# Patient Record
Sex: FEMALE | Race: WHITE | Employment: OTHER | URBAN - METROPOLITAN AREA
[De-identification: names, ages, dates, MRNs, and addresses within clinical notes are randomized per-mention and may not be internally consistent; named-entity substitution may affect disease eponyms.]

---

## 2017-01-26 RX ORDER — ACETAMINOPHEN 325 MG/1
975 TABLET ORAL ONCE
Status: DISCONTINUED | OUTPATIENT
Start: 2017-01-31 | End: 2017-02-01

## 2017-01-26 RX ORDER — DIPHENHYDRAMINE HYDROCHLORIDE 50 MG/ML
25 INJECTION, SOLUTION INTRAMUSCULAR; INTRAVENOUS ONCE
Status: DISCONTINUED | OUTPATIENT
Start: 2017-01-31 | End: 2017-02-01

## 2017-01-31 ENCOUNTER — HOSPITAL ENCOUNTER (OUTPATIENT)
Dept: INFUSION THERAPY | Age: 72
Discharge: HOME | End: 2017-01-31

## 2017-03-28 ENCOUNTER — APPOINTMENT (OUTPATIENT)
Dept: INFUSION THERAPY | Age: 72
End: 2017-03-28

## 2017-06-27 ENCOUNTER — HOSPITAL ENCOUNTER (OUTPATIENT)
Dept: MAMMOGRAPHY | Age: 72
Discharge: HOME | End: 2017-06-27
Attending: INTERNAL MEDICINE
Payer: MEDICARE

## 2017-06-27 DIAGNOSIS — Z12.31 VISIT FOR SCREENING MAMMOGRAM: ICD-10-CM

## 2017-06-27 PROCEDURE — 77067 SCR MAMMO BI INCL CAD: CPT

## 2017-07-26 PROBLEM — E03.9 ACQUIRED HYPOTHYROIDISM: Status: ACTIVE | Noted: 2017-07-26

## 2017-07-26 PROBLEM — Z79.899 ON STATIN THERAPY: Status: ACTIVE | Noted: 2017-07-26

## 2017-07-26 PROBLEM — Z78.0 POST-MENOPAUSAL: Status: ACTIVE | Noted: 2017-07-26

## 2017-07-26 PROBLEM — N18.9 CKD (CHRONIC KIDNEY DISEASE): Status: ACTIVE | Noted: 2017-07-26

## 2017-08-28 ENCOUNTER — OFFICE VISIT (OUTPATIENT)
Dept: INTERNAL MEDICINE CLINIC | Age: 72
End: 2017-08-28

## 2017-08-28 VITALS
RESPIRATION RATE: 18 BRPM | WEIGHT: 180.2 LBS | BODY MASS INDEX: 28.28 KG/M2 | HEIGHT: 67 IN | DIASTOLIC BLOOD PRESSURE: 86 MMHG | TEMPERATURE: 98 F | HEART RATE: 92 BPM | SYSTOLIC BLOOD PRESSURE: 132 MMHG | OXYGEN SATURATION: 96 %

## 2017-08-28 DIAGNOSIS — I48.0 PAF (PAROXYSMAL ATRIAL FIBRILLATION) (HCC): ICD-10-CM

## 2017-08-28 DIAGNOSIS — I10 ESSENTIAL HYPERTENSION: Primary | ICD-10-CM

## 2017-08-28 DIAGNOSIS — E03.9 ACQUIRED HYPOTHYROIDISM: ICD-10-CM

## 2017-08-28 DIAGNOSIS — M15.9 PRIMARY OSTEOARTHRITIS INVOLVING MULTIPLE JOINTS: ICD-10-CM

## 2017-08-28 DIAGNOSIS — Z12.11 COLON CANCER SCREENING: ICD-10-CM

## 2017-08-28 DIAGNOSIS — N18.2 CKD (CHRONIC KIDNEY DISEASE), STAGE 2 (MILD): ICD-10-CM

## 2017-08-28 DIAGNOSIS — E11.9 CONTROLLED TYPE 2 DIABETES MELLITUS WITHOUT COMPLICATION, WITHOUT LONG-TERM CURRENT USE OF INSULIN (HCC): ICD-10-CM

## 2017-08-28 DIAGNOSIS — E78.2 MIXED HYPERLIPIDEMIA: ICD-10-CM

## 2017-08-28 DIAGNOSIS — Z00.00 MEDICARE ANNUAL WELLNESS VISIT, INITIAL: ICD-10-CM

## 2017-08-28 LAB
ALBUMIN SERPL-MCNC: 4.1 G/DL (ref 3.9–5.4)
ALKALINE PHOS POC: 98 U/L (ref 38–126)
ALT SERPL-CCNC: 32 U/L (ref 9–52)
AST SERPL-CCNC: 28 U/L (ref 14–36)
BUN BLD-MCNC: 15 MG/DL (ref 7–17)
CALCIUM BLD-MCNC: 9.5 MG/DL (ref 8.4–10.2)
CHLORIDE BLD-SCNC: 102 MMOL/L (ref 98–107)
CHOLEST SERPL-MCNC: 179 MG/DL (ref 0–200)
CK (CPK) POC: 137 U/L (ref 30–135)
CO2 POC: 29 MMOL/L (ref 22–32)
CREAT BLD-MCNC: 0.6 MG/DL (ref 0.7–1.2)
EGFR (POC): 91.7
GLUCOSE POC: 108 MG/DL (ref 65–105)
HBA1C MFR BLD HPLC: 6.1 % (ref 4.5–5.7)
HDLC SERPL-MCNC: 58 MG/DL (ref 35–130)
LDL CHOLESTEROL POC: 84.2 MG/DL (ref 0–130)
MICROALBUMIN UR TEST STR-MCNC: NEGATIVE MG/L (ref 0–20)
POTASSIUM SERPL-SCNC: 4.1 MMOL/L (ref 3.6–5)
PROT SERPL-MCNC: 6.9 G/DL (ref 6.3–8.2)
SODIUM SERPL-SCNC: 144 MMOL/L (ref 137–145)
TCHOL/HDL RATIO (POC): 3.1 (ref 0–4)
TOTAL BILIRUBIN POC: 0.7 MG/DL (ref 0.2–1.3)
TRIGL SERPL-MCNC: 184 MG/DL (ref 0–200)
VLDLC SERPL CALC-MCNC: 36.8 MG/DL

## 2017-08-28 NOTE — PROGRESS NOTES
3 month follow up    Having left hip pain. Has Advanced Medical Directive. 1. Have you been to the ER, urgent care clinic since your last visit? Hospitalized since your last visit? No    2. Have you seen or consulted any other health care providers outside of the [address] [place] [address] since your last visit? Include any pap smears or colon screening.   No

## 2017-08-28 NOTE — MR AVS SNAPSHOT
Visit Information Date & Time Provider Department Dept. Phone Encounter #  
 8/28/2017 10:30 AM Constanza Bellamy MD [address] [phone] [id number] Follow-up Instructions Return in about 3 months (around 11/28/2017). Follow-up and Disposition History Your Appointments 12/6/2017  8:20 AM  
FOLLOW UP 10 with MD JOSE Yung [facility] ([facility]) Appt Note: [address] [id number] [address] [address] Upcoming Health Maintenance Date Due  
 EYE EXAM RETINAL OR DILATED Q1 3/7/1956 DTaP/Tdap/Td series (1 - Tdap) 3/7/1967 FOBT Q 1 YEAR AGE 50-75 3/7/1996 ZOSTER VACCINE AGE 60> 1/7/2006 GLAUCOMA SCREENING Q2Y 3/7/2011 OSTEOPOROSIS SCREENING (DEXA) 3/7/2011 Pneumococcal 65+ Low/Medium Risk (2 of 2 - PCV13) 10/1/2016 INFLUENZA AGE 9 TO ADULT 8/1/2017 HEMOGLOBIN A1C Q6M 2/28/2018 FOOT EXAM Q1 8/28/2018 MICROALBUMIN Q1 8/28/2018 LIPID PANEL Q1 8/28/2018 MEDICARE YEARLY EXAM 8/29/2018 BREAST CANCER SCRN MAMMOGRAM 6/27/2019 Allergies as of 8/28/2017  Review Complete On: 8/28/2017 By: Constanza Bellamy MD  
  
 Severity Noted Reaction Type Reactions Lisinopril  07/26/2017    Cough Current Immunizations  Reviewed on 8/28/2017 Name Date Influenza Vaccine 10/1/2016, 10/6/2015 Pneumococcal Conjugate (PCV-13) 10/1/2015 Pneumococcal Polysaccharide (PPSV-23) 6/1/2011, 6/1/2011 Reviewed by Bill Anton RN on 8/28/2017 at 11:06 AM  
You Were Diagnosed With   
  
 Codes Comments Essential hypertension    -  Primary ICD-10-CM: I10 
ICD-9-CM: 401.9 Mixed hyperlipidemia     ICD-10-CM: E78.2 ICD-9-CM: 272.2 Controlled type 2 diabetes mellitus without complication, without long-term current use of insulin ([facility]     ICD-10-CM: E11.9 ICD-9-CM: 250.00 CKD (chronic kidney disease), stage 2 (mild)     ICD-10-CM: N18.2 ICD-9-CM: 585.2 Primary osteoarthritis involving multiple joints     ICD-10-CM: M15.0 ICD-9-CM: 715.09   
 PAF (paroxysmal atrial fibrillation) (HCC)     ICD-10-CM: I48.0 ICD-9-CM: 427.31 Acquired hypothyroidism     ICD-10-CM: E03.9 ICD-9-CM: 244.9 Medicare annual wellness visit, initial     ICD-10-CM: Z00.00 ICD-9-CM: V70.0 Colon cancer screening     ICD-10-CM: Z12.11 ICD-9-CM: V76.51 Vitals BP Pulse Temp Resp Height(growth percentile) Weight(growth percentile) 132/86 (BP 1 Location: Right arm, BP Patient Position: Sitting) 92 98 °F (36.7 °C) (Oral) 18 5' 7\" (1.702 m) 180 lb 3.2 oz (81.7 kg) SpO2 BMI OB Status Smoking Status 96% 28.22 kg/m2 Postmenopausal Former Smoker BMI and BSA Data Body Mass Index Body Surface Area  
 28.22 kg/m 2 1.97 m 2 Preferred Pharmacy Pharmacy Name Phone [facility] [phone] Your Updated Medication List  
  
   
This list is accurate as of: 8/28/17 12:00 PM.  Always use your most recent med list. amLODIPine 2.5 mg tablet Commonly known as:  Elisa Jose Take 2.5 mg by mouth daily. calcium-cholecalciferol (D3) tablet Commonly known as:  CALTRATE 600+D Take 1 Tab by mouth daily. COMPLETE MULTIVITAMIN PO Take 1 Tab by mouth daily. Indications: Complete multivitamin Women's 50+ DICLOFENAC SODIUM OP Take 1 Tab by mouth two (2) times daily as needed. Indications: 75 MG  
  
 hydroCHLOROthiazide 25 mg tablet Commonly known as:  HYDRODIURIL Take 12.5 mg by mouth daily. levothyroxine 50 mcg tablet Commonly known as:  SYNTHROID Take 50 mcg by mouth Daily (before breakfast). nateglinide 120 mg tablet Commonly known as:  Meghna Anderson Take 120 mg by mouth Before breakfast, lunch, and dinner. We Performed the Following AMB POC CK (CPK) [88604 CPT(R)] AMB POC COMPREHENSIVE METABOLIC PANEL [75997 CPT(R)] AMB POC FECAL BLOOD, OCCULT, QL 3 CARDS [39612 CPT(R)] AMB POC HEMOGLOBIN A1C [19729 CPT(R)] AMB POC LIPID PROFILE [73091 CPT(R)] AMB POC URINE, MICROALBUMIN, SEMIQUANT (1 RESULT) [80351 CPT(R)] Follow-up Instructions Return in about 3 months (around 11/28/2017). Introducing [facility]! Leland Tamayo introduces [employer] patient portal. Now you can access parts of your medical record, email your doctor's office, and request medication refills online. 1. In your internet browser, go to [email or web address] [email or web address] 2. Click on the First Time User? Click Here link in the Sign In box. You will see the New Member Sign Up page. 3. Enter your [employer] Access Code exactly as it appears below. You will not need to use this code after youve completed the sign-up process. If you do not sign up before the expiration date, you must request a new code. · [employer] Access Code: [email or web address] Expires: 9/25/2017 10:06 AM 
 
4. Enter the last four digits of your Social Security Number (xxxx) and Date of Birth (mm/dd/yyyy) as indicated and click Submit. You will be taken to the next sign-up page. 5. Create a [employer] ID. This will be your [employer] login ID and cannot be changed, so think of one that is secure and easy to remember. 6. Create a [employer] password. You can change your password at any time. 7. Enter your Password Reset Question and Answer. This can be used at a later time if you forget your password. 8. Enter your e-mail address. You will receive e-mail notification when new information is available in [address] 9. Click Sign Up. You can now view and download portions of your medical record. 10. Click the Download Summary menu link to download a portable copy of your medical information. If you have questions, please visit the Frequently Asked Questions section of the [employer] website. Remember, [employer] is NOT to be used for urgent needs. For medical emergencies, dial 911. Now available from your iPhone and Android! Please provide this summary of care documentation to your next provider. Your primary care clinician is listed as Jane. If you have any questions after today's visit, please call [phone].

## 2017-08-28 NOTE — PROGRESS NOTES
This is an Initial Medicare Annual Wellness Exam (AWV) (Performed 12 months after IPPE or effective date of Medicare Part B enrollment, Once in a lifetime)    I have reviewed the patient's medical history in detail and updated the computerized patient record. She presents today for initial annual Medicare wellness examination. She is also here for follow-up of her medical problems include hypertension, diabetes, hyperlipidemia, chronic kidney disease, DJD, and history of Crohn's disease. She is noting a little discomfort with some back pain now. She is only been taking the diclofenac on a as needed basis when she develops pleurisy and thus has not taken for a back. There are no other arthritic complaints. She denies any cardiorespiratory complaints. There are no GI/ complaints. No other complaints on complete review of systems. She is taking medications and trying to follow her diet on a regular basis. She is trying to get some exercise but no she is not getting enough. History     Past Medical History:   Diagnosis Date    Acquired hypothyroidism 7/26/2017    CKD (chronic kidney disease) 7/26/2017    [facility]     Diabetes ([facility]     Hypertension     On statin therapy 7/26/2017    Post-menopausal 7/26/2017      Past Surgical History:   Procedure Laterality Date    BREAST SURGERY PROCEDURE UNLISTED      cyst removed right    HX GI      tumor from colon removed    HX ORTHOPAEDIC      right wrist    MA COLONOSCOPY W/BIOPSY SINGLE/MULTIPLE  4/9/2014          Current Outpatient Prescriptions   Medication Sig Dispense Refill    DICLOFENAC SODIUM OP Take 1 Tab by mouth two (2) times daily as needed. Indications: 75 MG      MULTIVIT,TH IRON,OTHER MIN (COMPLETE MULTIVITAMIN PO) Take 1 Tab by mouth daily. Indications: Complete multivitamin Women's 50+      calcium-cholecalciferol, D3, (CALTRATE 600+D) tablet Take 1 Tab by mouth daily.       hydrochlorothiazide (HYDRODIURIL) 25 mg tablet Take 12.5 mg by mouth daily.  amLODIPine (NORVASC) 2.5 mg tablet Take 2.5 mg by mouth daily.  levothyroxine (SYNTHROID) 50 mcg tablet Take 50 mcg by mouth Daily (before breakfast).  nateglinide (STARLIX) 120 mg tablet Take 120 mg by mouth Before breakfast, lunch, and dinner. Allergies   Allergen Reactions    Lisinopril Cough     Family History   Problem Relation Age of Onset    Diabetes Mother     Heart Failure Mother     Cancer Father      stomach     Social History   Substance Use Topics    Smoking status: Former Smoker     Quit date: 1979    Smokeless tobacco: Never Used    Alcohol use Yes      Comment: occasionally     Patient Active Problem List   Diagnosis Code    SBO (small bowel obstruction) ([facility] K56.69    Crohn disease ([facility] K50.90    Hypertension I10    Diabetes mellitus type 2, controlled ([facility] E11.9    Hyperlipidemia E78.5    DJD (degenerative joint disease) M19.90    PAF (paroxysmal atrial fibrillation) (HCC) I48.0    Post-menopausal Z78.0    On statin therapy Z79.899    Acquired hypothyroidism E03.9    CKD (chronic kidney disease) N18.9    Medicare annual wellness visit, initial Z00.00    Colon cancer screening Z12.11       Depression Risk Factor Screening:     PHQ over the last two weeks 8/28/2017   Little interest or pleasure in doing things Not at all   Feeling down, depressed or hopeless Not at all   Total Score PHQ 2 0     Alcohol Risk Factor Screening: You do not drink alcohol or very rarely. Functional Ability and Level of Safety:     Hearing Loss  Hearing is good. Activities of Daily Living  The home contains: no safety equipment  Patient does total self care    Fall RiskFall Risk Assessment, last 12 mths 8/28/2017   Able to walk? Yes   Fall in past 12 months?  No       Abuse Screen  Patient is not abused    Cognitive Screening   Evaluation of Cognitive Function:  Has your family/caregiver stated any concerns about your memory: no  Normal ROS:    Constitutional: She denies fevers, weight loss, sweats. Eyes: No blurred or double vision. ENT: No difficulty with swallowing, taste, speech or smell. NECK: no stiffness swelling or lymph node enlargement  Respiratory: No cough wheezing or shortness of breath. Cardiovascular: Denies chest pain, palpitations, unexplained indigestion or syncope. Breast: She has noted no masses or lumps and no discharge or axillary swelling  Gastrointestinal:  No changes in bowel movements, no abdominal pain, no bloating. Genitourinary: No discharge or abnormal bleeding or pain  Extremities: No joint pain, stiffness or swelling. Some back pain that goes down the left leg. Neurological:  No numbness, tingling, burring paresthesias or loss of motor strength. No syncope, dizziness or frequent headache  Skin:  No recent rashes or mole changes. Psychiatric/Behavioral:  Negative for depression. The patient is not nervous/anxious. HEMATOLOGIC: no easy bruising or bleeding gums  Endocrine: no sweats of urinary frequency or excessive thirst    Vitals:    08/28/17 1111   BP: 132/86   Pulse: 92   Resp: 18   Temp: 98 °F (36.7 °C)   TempSrc: Oral   SpO2: 96%   Weight: 180 lb 3.2 oz (81.7 kg)   Height: 5' 7\" (1.702 m)   PainSc:   8   PainLoc: Hip        PHYSICAL EXAM:    General appearance - alert, well appearing, and in no distress  Mental status - alert, oriented to person, place, and time  HEENT:  Ears - bilateral TM's and external ear canals clear  Eyes - pupillary responses were normal.  Extraocular muscle function intact. Lids and conjunctiva not injected. Fundoscopic exam revealed sharp disc margins. eye movements intact  Pharynx- clear with teeth in good repair. No masses were noted  Neck - supple without thyromegaly or burit. No JVD noted  Lungs - clear to auscultation and percussion  Cardiac- normal rate, regular rhythm without murmurs. PMI not displaced.   No gallop, rub or click  Breast: deferred to GYN  Abdomen - flat, soft, non-tender without palpable organomegaly or mass. No pulsatile mass was felt, and not bruit was heard. Bowel sounds were active   Female - deferred to GYN  Rectal - deferred to GYN  Extremities -  no clubbing cyanosis or edema, no diabetic foot ulcer changes noted  Lymphatics - no palpable lymphadenopathy, no hepatosplenomegaly  Peripheral vascular - Dorsalis pedis and posterior tibial pulses felt without difficulty  Skin - no rash or unusual mole change noted, no diabetic foot changes noted no ulcerations  Neurological - Cranial nerves II-XII grossly intact. Motor strength 5/5. DTR's 2+ and symmetric. Station and gait normal.  Normal distal sensation and proprioception in all toes of both feet  Back exam - full range of motion, no tenderness, palpable spasm or pain on motion  Musculoskeletal - no joint tenderness, deformity or swelling. Back without point CVA tenderness  Hematologic: no purpura, petechiae or bruising    Patient Care Team   Patient Care Team:  Bradford Franklin MD as PCP - General (Internal Medicine)    Advice/Referrals/Counseling   Education and counseling provided:  Are appropriate based on today's review and evaluation  Colorectal cancer screening tests    Assessment/Plan     ASSESSMENT:   1. Essential hypertension    2. Mixed hyperlipidemia    3. Controlled type 2 diabetes mellitus without complication, without long-term current use of insulin ([address]    4. CKD (chronic kidney disease), stage 2 (mild)    5. Primary osteoarthritis involving multiple joints    6. PAF (paroxysmal atrial fibrillation) ([address]    7. Acquired hypothyroidism    8. Medicare annual wellness visit, initial    9. Colon cancer screening      Impression  1. Hypertension that is controlled  2. Hyperlipidemia we will see what that status is make further recommendations adjustments as necessary  3.   Diabetes I reviewed her last numbers with her and will see what that status is today make adjustments as necessary  4. CKD that status is pending last numbers reviewed with her  5. DJD seems to be stable except for the back pain as noted I told her to take the diclofenac twice a day for couple weeks and see if it helps. 6.  PAF seems to be in sinus rhythm today  7. Hypothyroid stable last check  Medicare annual wellness questionnaire and examination performed on patient today. The results were discussed with her and her questions were answered. Lifestyle modifications and recommendations discussed and made. Laboratory studies are pending as noted we will make further recommendations based upon those. Follow stable continue same and recheck her in 3 months or sooner should they be a problem she will notify me if the back does not improve with the above treatment. PLAN:  .  Orders Placed This Encounter    AMB POC FECAL OCCULT BLOOD QL-3 CARDS    AMB POC LIPID PROFILE    AMB POC HEMOGLOBIN A1C    AMB POC COMPREHENSIVE METABOLIC PANEL    AMB POC CK (CPK)    AMB POC URINE, MICROALBUMIN, SEMIQUANT (1 RESULT)         ATTENTION:   This medical record was transcribed using an electronic medical records system. Although proofread, it may and can contain electronic and spelling errors. Other human spelling and other errors may be present. Corrections may be executed at a later time. Please feel free to contact us for any clarifications as needed. Follow-up Disposition:  Return in about 3 months (around 11/28/2017).       Lino Yee MD  Health Maintenance Due   Topic Date Due    EYE EXAM RETINAL OR DILATED Q1  03[birth year]07/1956    DTaP/Tdap/Td series (1 - Tdap) 03/07/1967    FOBT Q 1 YEAR AGE 50-75  03/07/1996    ZOSTER VACCINE AGE 60>  01/07/2006    GLAUCOMA SCREENING Q2Y  03/07/2011    OSTEOPOROSIS SCREENING (DEXA)  03/07/2011    Pneumococcal 65+ Low/Medium Risk (2 of 2 - PCV13) 10/01/2016    INFLUENZA AGE 9 TO ADULT  08/01/2017

## 2017-09-01 LAB
HEMOCCULT STL QL: NEGATIVE
VALID INTERNAL CONTROL?: YES

## 2017-10-04 DIAGNOSIS — I10 ESSENTIAL HYPERTENSION: Primary | ICD-10-CM

## 2017-10-05 RX ORDER — AMLODIPINE BESYLATE 2.5 MG/1
TABLET ORAL
Qty: 30 TAB | Refills: 11 | Status: SHIPPED | OUTPATIENT
Start: 2017-10-05 | End: 2018-09-25 | Stop reason: SDUPTHER

## 2017-10-05 NOTE — TELEPHONE ENCOUNTER
Requested Prescriptions     Pending Prescriptions Disp Refills    amLODIPine (NORVASC) 2.5 mg tablet [Pharmacy Med Name: AMLODIPINE 2.5MG] 30 Tab 11     Sig: TAKE 1 TABLET EVERY DAY

## 2017-10-23 ENCOUNTER — OFFICE VISIT (OUTPATIENT)
Dept: INTERNAL MEDICINE CLINIC | Age: 72
End: 2017-10-23

## 2017-10-23 VITALS
HEART RATE: 79 BPM | DIASTOLIC BLOOD PRESSURE: 73 MMHG | TEMPERATURE: 98.4 F | RESPIRATION RATE: 16 BRPM | BODY MASS INDEX: 28.88 KG/M2 | HEIGHT: 67 IN | WEIGHT: 184 LBS | OXYGEN SATURATION: 96 % | SYSTOLIC BLOOD PRESSURE: 112 MMHG

## 2017-10-23 DIAGNOSIS — J20.9 ACUTE BRONCHITIS, UNSPECIFIED ORGANISM: Primary | ICD-10-CM

## 2017-10-23 RX ORDER — LEVOFLOXACIN 500 MG/1
500 TABLET, FILM COATED ORAL DAILY
Qty: 10 TAB | Refills: 0 | Status: SHIPPED | OUTPATIENT
Start: 2017-10-23 | End: 2017-12-06 | Stop reason: ALTCHOICE

## 2017-10-23 NOTE — PROGRESS NOTES
Chief Complaint   Patient presents with    Cold Symptoms     c/o cough and sore throat for past 24 hrs. Cough is mildly productive with clear sputum. 1. Have you been to the ER, urgent care clinic since your last visit? Hospitalized since your last visit? No    2. Have you seen or consulted any other health care providers outside of the [address] [place] [address] since your last visit? Include any pap smears or colon screening.  No

## 2017-10-23 NOTE — PROGRESS NOTES
Subjective:   Jeff Jacob is a 71 y.o. female      Chief Complaint   Patient presents with    Cold Symptoms     c/o cough and sore throat for past 24 hrs. Cough is mildly productive with clear sputum. History of present illness: She presents, a lot of sinus and nasal congestion as well as a lot of chest congestion cough present over the past 2-3 days. She is noted no fevers or chills with this although she had a little fever this morning she woke up. She has not noted any color of the sputum. There are no other complaints particular she denies any shortness of breath. Patient Active Problem List   Diagnosis Code    SBO (small bowel obstruction) K56.609    Crohn disease ([facility] K50.90    Hypertension I10    Diabetes mellitus type 2, controlled ([facility] E11.9    Hyperlipidemia E78.5    DJD (degenerative joint disease) M19.90    PAF (paroxysmal atrial fibrillation) ([facility]) I48.0    Post-menopausal Z78.0    On statin therapy Z79.899    Acquired hypothyroidism E03.9    CKD (chronic kidney disease) N18.9    Medicare annual wellness visit, initial Z00.00    Colon cancer screening Z12.11    Acute bronchitis J20.9      Past Medical History:   Diagnosis Date    Acquired hypothyroidism 7/26/2017    CKD (chronic kidney disease) 7/26/2017    [facility]     Diabetes ([facility]     Hypertension     On statin therapy 7/26/2017    Post-menopausal 7/26/2017      Allergies   Allergen Reactions    Lisinopril Cough      Family History   Problem Relation Age of Onset    Diabetes Mother     Heart Failure Mother     Cancer Father      stomach      Social History     Social History    Marital status:      Spouse name: N/A    Number of children: N/A    Years of education: N/A     Occupational History    Not on file.      Social History Main Topics    Smoking status: Former Smoker     Quit date: 1979    Smokeless tobacco: Never Used    Alcohol use Yes      Comment: occasionally    Drug use: No    Sexual activity: No     Other Topics Concern    Not on file     Social History Narrative     Prior to Admission medications    Medication Sig Start Date End Date Taking? Authorizing Provider   levoFLOXacin (LEVAQUIN) 500 mg tablet Take 1 Tab by mouth daily. 10/23/17  Yes Carolyne Gomez MD   amLODIPine (NORVASC) 2.5 mg tablet TAKE 1 TABLET EVERY DAY 10/5/17  Yes Carolyne Gomez MD   DICLOFENAC SODIUM OP Take 1 Tab by mouth two (2) times daily as needed. Indications: 75 MG   Yes Historical Provider   MULTIVIT,TH IRON,OTHER MIN (COMPLETE MULTIVITAMIN PO) Take 1 Tab by mouth daily. Indications: Complete multivitamin Women's 50+   Yes Cordelia Queen MD   calcium-cholecalciferol, D3, (CALTRATE 600+D) tablet Take 1 Tab by mouth daily. Yes Cordelia Queen MD   hydrochlorothiazide (HYDRODIURIL) 25 mg tablet Take 12.5 mg by mouth daily. Yes Historical Provider   levothyroxine (SYNTHROID) 50 mcg tablet Take 50 mcg by mouth Daily (before breakfast). Yes Historical Provider   nateglinide (STARLIX) 120 mg tablet Take 120 mg by mouth Before breakfast, lunch, and dinner. Yes Historical Provider        Review of Systems              Constitutional:  She denies fever, weight loss, sweats or fatigue. EYES: No blurred or double vision,               ENT: Positive for nasal congestion, no headache or dizziness. No difficulty with               swallowing, taste, speech or smell. Respiratory: Some cough without wheezing or shortness of breath. No sputum production. Cardiac:  Denies chest pain, palpitations, unexplained indigestion, syncope, edema, PND or orthopnea. GI:  No changes in bowel movements, no abdominal pain, no bloating, anorexia, nausea, vomiting or heartburn. :  No frequency or dysuria. Denies incontinence or sexual dysfunction. Extremities:  No joint pain, stiffness or swelling  Back:.no pain or soreness  Skin:  No recent rashes or mole changes.   Neurological:  No numbness, tingling, burning paresthesias or loss of motor strength. No syncope, dizziness, frequent headaches or memory loss. Hematologic:  No easy bruising  Lymphatic: No lymph node enlargement    Objective:     Vitals:    10/23/17 1658   BP: 112/73   Pulse: 79   Resp: 16   Temp: 98.4 °F (36.9 °C)   TempSrc: Oral   SpO2: 96%   Weight: 184 lb (83.5 kg)   Height: 5' 7\" (1.702 m)   PainSc:   0 - No pain       Body mass index is 28.82 kg/(m^2). Physical Examination:              General Appearance:  Well-developed, well-nourished, no acute distress. HEENT:      Ears:  The TMs and ear canals were clear. Eyes:  The pupillary responses were normal.  Extraocular muscle function intact. Lids and conjunctiva not injected. Funduscopic exam revealed sharp disc margins. Nares: Clear w/o edema or erythema  Pharynx:  Clear with teeth in good repair. No masses were noted. Neck:  Supple without thyromegaly or adenopathy. No JVD noted. No carotid                bruits. Lungs:  Clear to auscultation except some scattered rhonchi without wheezes or rales and normal percussion. Cardiac:  Regular rate and rhythm without murmur. PMI not displaced. No gallop, rub or click. Abdominal: Soft, non-tender, no hepata-spleenomegally or masses  Extremities:  No clubbing, cyanosis or edema. Skin:  No rash or unusual mole changes noted. Lymph Nodes:  None felt in the cervical, supraclavicular, axillary or inguinal region. Neurological: . DTRs 2+ and symmetric. Station and gait normal.   Hematologic:   No purpura or petechiae        Assessment/Plan:         1. Acute bronchitis, unspecified organism        Impressions/Plan: We will treat her acute bronchitis with Levaquin 500 daily for 10 days recheck if not resolved    Orders Placed This Encounter    levoFLOXacin (LEVAQUIN) 500 mg tablet       Follow-up Disposition: Not on File    No results found for any visits on 10/23/17.       Kendra Grove MD    The patient was given after the visit summary the patient verbalized an understanding of the plans and problems as explained.

## 2017-10-23 NOTE — MR AVS SNAPSHOT
Visit Information Date & Time Provider Department Dept. Phone Encounter #  
 10/23/2017  4:00 PM Alexsandra Zimmer, [address] [facility] [phone] [id number] Your Appointments 12/6/2017  8:20 AM  
FOLLOW UP 10 with [facility] ([facility]) Appt Note: [address] [facility] [address] [phone] [address] [address] Upcoming Health Maintenance Date Due  
 EYE EXAM RETINAL OR DILATED Q1 3/7/1956 DTaP/Tdap/Td series (1 - Tdap) 3/7/1967 ZOSTER VACCINE AGE 60> 1/7/2006 GLAUCOMA SCREENING Q2Y 3/7/2011 OSTEOPOROSIS SCREENING (DEXA) 3/7/2011 INFLUENZA AGE 9 TO ADULT 8/1/2017 HEMOGLOBIN A1C Q6M 2/28/2018 FOOT EXAM Q1 8/28/2018 MICROALBUMIN Q1 8/28/2018 LIPID PANEL Q1 8/28/2018 MEDICARE YEARLY EXAM 8/29/2018 FOBT Q 1 YEAR AGE 50-75 9/1/2018 BREAST CANCER SCRN MAMMOGRAM 6/27/2019 Allergies as of 10/23/2017  Review Complete On: 10/23/2017 By: Sarah Sen LPN Severity Noted Reaction Type Reactions Lisinopril  07/26/2017    Cough Current Immunizations  Reviewed on 8/28/2017 Name Date Influenza Vaccine 10/1/2016, 10/6/2015 Pneumococcal Conjugate (PCV-13) 10/1/2015 Pneumococcal Polysaccharide (PPSV-23) 6/1/2011, 6/1/2011 Not reviewed this visit Vitals BP Pulse Temp Resp Height(growth percentile) Weight(growth percentile) 112/73 (BP 1 Location: Right arm, BP Patient Position: Sitting) 79 98.4 °F (36.9 °C) (Oral) 16 5' 7\" (1.702 m) 184 lb (83.5 kg) SpO2 BMI OB Status Smoking Status 96% 28.82 kg/m2 Postmenopausal Former Smoker BMI and BSA Data Body Mass Index Body Surface Area  
 28.82 kg/m 2 1.99 m 2 Preferred Pharmacy Pharmacy Name Phone  [facility] [address] [phone] Your Updated Medication List  
  
   
This list is accurate as of: 10/23/17  5:08 PM.  Always use your most recent med list. amLODIPine 2.5 mg tablet Commonly known as:  Demetrius Alstrom TAKE 1 TABLET EVERY DAY  
  
 calcium-cholecalciferol (D3) tablet Commonly known as:  CALTRATE 600+D Take 1 Tab by mouth daily. COMPLETE MULTIVITAMIN PO Take 1 Tab by mouth daily. Indications: Complete multivitamin Women's 50+ DICLOFENAC SODIUM OP Take 1 Tab by mouth two (2) times daily as needed. Indications: 75 MG  
  
 hydroCHLOROthiazide 25 mg tablet Commonly known as:  HYDRODIURIL Take 12.5 mg by mouth daily. levothyroxine 50 mcg tablet Commonly known as:  SYNTHROID Take 50 mcg by mouth Daily (before breakfast). nateglinide 120 mg tablet Commonly known as:  Scott Giancarlo Take 120 mg by mouth Before breakfast, lunch, and dinner. Introducing [facility]! Swathi Tristan introduces [employer] patient portal. Now you can access parts of your medical record, email your doctor's office, and request medication refills online. 1. In your internet browser, go to [email or web address] 2. Click on the First Time User? Click Here link in the Sign In box. You will see the New Member Sign Up page. 3. Enter your [employer] Access Code exactly as it appears below. You will not need to use this code after youve completed the sign-up process. If you do not sign up before the expiration date, you must request a new code. · [employer] Access Code: [email or web address] Expires: 1/21/2018  3:54 PM 
 
4. Enter the last four digits of your Social Security Number (xxxx) and Date of Birth (mm/dd/yyyy) as indicated and click Submit. You will be taken to the next sign-up page. 5. Create a [employer] ID. This will be your [employer] login ID and cannot be changed, so think of one that is secure and easy to remember. 6. Create a [employer] password. You can change your password at any time. 7. Enter your Password Reset Question and Answer. This can be used at a later time if you forget your password. 8. Enter your e-mail address. You will receive e-mail notification when new information is available in [address] 9. Click Sign Up. You can now view and download portions of your medical record. 10. Click the Download Summary menu link to download a portable copy of your medical information. If you have questions, please visit the Frequently Asked Questions section of the [employer] website. Remember, [employer] is NOT to be used for urgent needs. For medical emergencies, dial 911. Now available from your iPhone and Android! Please provide this summary of care documentation to your next provider. Your primary care clinician is listed as Jane. If you have any questions after today's visit, please call [phone].

## 2017-11-06 RX ORDER — HYDROCHLOROTHIAZIDE 25 MG/1
TABLET ORAL
Qty: 30 TAB | Refills: 11 | Status: SHIPPED | OUTPATIENT
Start: 2017-11-06 | End: 2018-10-30 | Stop reason: SDUPTHER

## 2017-11-06 NOTE — TELEPHONE ENCOUNTER
Requested Prescriptions     Pending Prescriptions Disp Refills    hydroCHLOROthiazide (HYDRODIURIL) 25 mg tablet [Pharmacy Med Name: HYDROCHLOROT 25MG] 30 Tab 11     Sig: TAKE 1 TABLET EVERY DAY

## 2017-12-06 ENCOUNTER — OFFICE VISIT (OUTPATIENT)
Dept: INTERNAL MEDICINE CLINIC | Age: 72
End: 2017-12-06

## 2017-12-06 VITALS
BODY MASS INDEX: 28.06 KG/M2 | OXYGEN SATURATION: 94 % | WEIGHT: 178.8 LBS | SYSTOLIC BLOOD PRESSURE: 117 MMHG | DIASTOLIC BLOOD PRESSURE: 73 MMHG | RESPIRATION RATE: 16 BRPM | HEART RATE: 95 BPM | HEIGHT: 67 IN | TEMPERATURE: 98.4 F

## 2017-12-06 DIAGNOSIS — I48.0 PAF (PAROXYSMAL ATRIAL FIBRILLATION) (HCC): ICD-10-CM

## 2017-12-06 DIAGNOSIS — E11.9 CONTROLLED TYPE 2 DIABETES MELLITUS WITHOUT COMPLICATION, WITHOUT LONG-TERM CURRENT USE OF INSULIN (HCC): ICD-10-CM

## 2017-12-06 DIAGNOSIS — T75.89XA EFFECTS OF EXPOSURE TO EXTERNAL CAUSE, INITIAL ENCOUNTER: ICD-10-CM

## 2017-12-06 DIAGNOSIS — I10 ESSENTIAL HYPERTENSION: Primary | ICD-10-CM

## 2017-12-06 DIAGNOSIS — N18.2 STAGE 2 CHRONIC KIDNEY DISEASE: ICD-10-CM

## 2017-12-06 DIAGNOSIS — Z23 ENCOUNTER FOR IMMUNIZATION: ICD-10-CM

## 2017-12-06 DIAGNOSIS — J01.00 ACUTE NON-RECURRENT MAXILLARY SINUSITIS: ICD-10-CM

## 2017-12-06 DIAGNOSIS — E78.2 MIXED HYPERLIPIDEMIA: ICD-10-CM

## 2017-12-06 DIAGNOSIS — M15.9 PRIMARY OSTEOARTHRITIS INVOLVING MULTIPLE JOINTS: ICD-10-CM

## 2017-12-06 LAB
ALBUMIN SERPL-MCNC: 4.1 G/DL (ref 3.9–5.4)
ALKALINE PHOS POC: 109 U/L (ref 38–126)
ALT SERPL-CCNC: 57 U/L (ref 9–52)
AST SERPL-CCNC: 56 U/L (ref 14–36)
BUN BLD-MCNC: 21 MG/DL (ref 7–17)
CALCIUM BLD-MCNC: 9.2 MG/DL (ref 8.4–10.2)
CHLORIDE BLD-SCNC: 101 MMOL/L (ref 98–107)
CHOLEST SERPL-MCNC: 168 MG/DL (ref 0–200)
CO2 POC: 30 MMOL/L (ref 22–32)
CREAT BLD-MCNC: 0.7 MG/DL (ref 0.7–1.2)
EGFR (POC): 87.2
GLUCOSE POC: 134 MG/DL (ref 65–105)
HDLC SERPL-MCNC: 53 MG/DL (ref 35–130)
LDL CHOLESTEROL POC: 83.6 MG/DL (ref 0–130)
POTASSIUM SERPL-SCNC: 4.3 MMOL/L (ref 3.6–5)
PROT SERPL-MCNC: 7 G/DL (ref 6.3–8.2)
SODIUM SERPL-SCNC: 141 MMOL/L (ref 137–145)
TCHOL/HDL RATIO (POC): 3.2 (ref 0–4)
TOTAL BILIRUBIN POC: 0.5 MG/DL (ref 0.2–1.3)
TRIGL SERPL-MCNC: 157 MG/DL (ref 0–200)
VLDLC SERPL CALC-MCNC: 31.4 MG/DL

## 2017-12-06 RX ORDER — AZITHROMYCIN 250 MG/1
250 TABLET, FILM COATED ORAL SEE ADMIN INSTRUCTIONS
Qty: 6 TAB | Refills: 0 | Status: SHIPPED | OUTPATIENT
Start: 2017-12-06 | End: 2017-12-11

## 2017-12-06 NOTE — MR AVS SNAPSHOT
Visit Information Date & Time Provider Department Dept. Phone Encounter #  
 12/6/2017  8:20 AM Derek Quintanilla MD [address] [facility] [phone] [id number] Follow-up Instructions Return in about 3 months (around 3/6/2018). Your Appointments 3/29/2018  8:20 AM  
FOLLOW UP 10 with MD BRODY Tan [facility] ([facility]) Appt Note: [address] [facility] [address] [phone] [address] [address] Upcoming Health Maintenance Date Due  
 EYE EXAM RETINAL OR DILATED Q1 3/7/1956 DTaP/Tdap/Td series (1 - Tdap) 3/7/1967 ZOSTER VACCINE AGE 60> 1/7/2006 GLAUCOMA SCREENING Q2Y 3/7/2011 COLONOSCOPY 12/23/2013 HEMOGLOBIN A1C Q6M 2/28/2018 MICROALBUMIN Q1 8/28/2018 LIPID PANEL Q1 8/28/2018 MEDICARE YEARLY EXAM 8/29/2018 FOOT EXAM Q1 12/6/2018 BREAST CANCER SCRN MAMMOGRAM 6/27/2019 Allergies as of 12/6/2017  Review Complete On: 12/6/2017 By: Derek Quintanilla MD  
  
 Severity Noted Reaction Type Reactions Lisinopril  07/26/2017    Cough Current Immunizations  Reviewed on 8/28/2017 Name Date Influenza High Dose Vaccine PF 12/6/2017 Influenza Vaccine 10/1/2016, 10/6/2015 Pneumococcal Conjugate (PCV-13) 10/1/2015 Pneumococcal Polysaccharide (PPSV-23) 6/1/2011, 6/1/2011 Not reviewed this visit You Were Diagnosed With   
  
 Codes Comments Essential hypertension    -  Primary ICD-10-CM: I10 
ICD-9-CM: 401.9 Mixed hyperlipidemia     ICD-10-CM: E78.2 ICD-9-CM: 272.2 Controlled type 2 diabetes mellitus without complication, without long-term current use of insulin ([facility]     ICD-10-CM: E11.9 ICD-9-CM: 250.00 Stage 2 chronic kidney disease     ICD-10-CM: N18.2 ICD-9-CM: 585.2 Primary osteoarthritis involving multiple joints     ICD-10-CM: M15.0 ICD-9-CM: 715.09   
 PAF (paroxysmal atrial fibrillation) (HCC)     ICD-10-CM: I48.0 ICD-9-CM: 427.31 Acute non-recurrent maxillary sinusitis     ICD-10-CM: J01.00 ICD-9-CM: 461.0 Encounter for immunization     ICD-10-CM: C77 ICD-9-CM: V03.89 Effects of exposure to external cause, initial encounter     ICD-10-CM: T75.89XA ICD-9-CM: 994.9 Vitals BP Pulse Temp Resp Height(growth percentile) Weight(growth percentile) 117/73 (BP 1 Location: Left arm, BP Patient Position: Sitting) 95 98.4 °F (36.9 °C) (Oral) 16 5' 7\" (1.702 m) 178 lb 12.8 oz (81.1 kg) SpO2 BMI OB Status Smoking Status 94% 28 kg/m2 Postmenopausal Former Smoker Vitals History BMI and BSA Data Body Mass Index Body Surface Area  
 28 kg/m 2 1.96 m 2 Preferred Pharmacy Pharmacy Name Phone [facility] [phone] Your Updated Medication List  
  
   
This list is accurate as of: 12/6/17  9:18 AM.  Always use your most recent med list. amLODIPine 2.5 mg tablet Commonly known as:  Christi Livan TAKE 1 TABLET EVERY DAY  
  
 azithromycin 250 mg tablet Commonly known as:  Zohra Lottie Take 1 Tab by mouth See Admin Instructions for 5 days. Take 2 tablets the first day, then 1 tablet daily for the next four days. calcium-cholecalciferol (D3) tablet Commonly known as:  CALTRATE 600+D Take 1 Tab by mouth daily. COMPLETE MULTIVITAMIN PO Take 1 Tab by mouth daily. Indications: Complete multivitamin Women's 50+ DICLOFENAC SODIUM OP Take 1 Tab by mouth two (2) times daily as needed. Indications: 75 MG  
  
 hydroCHLOROthiazide 25 mg tablet Commonly known as:  HYDRODIURIL  
TAKE 1 TABLET EVERY DAY  
  
 levothyroxine 50 mcg tablet Commonly known as:  SYNTHROID Take 50 mcg by mouth Daily (before breakfast). MUCINEX D PO Take  by mouth as needed. nateglinide 120 mg tablet Commonly known as:  Lc Larson Take 120 mg by mouth Before breakfast, lunch, and dinner. Prescriptions Sent to Pharmacy Refills  
 azithromycin (ZITHROMAX) 250 mg tablet 0 Sig: Take 1 Tab by mouth See Admin Instructions for 5 days. Take 2 tablets the first day, then 1 tablet daily for the next four days. Class: Normal  
 Pharmacy: [facility] #: [phone] Route: Oral  
  
We Performed the Following ADMIN INFLUENZA VIRUS VAC [ HCP] AMB POC COMPREHENSIVE METABOLIC PANEL [08961 CPT(R)] AMB POC HEMOGLOBIN A1C [79234 CPT(R)] AMB POC LIPID PROFILE [95826 CPT(R)] HEPATITIS C AB [49486 CPT(R)]  DIABETES FOOT EXAM [HM7 Custom] INFLUENZA VIRUS VACCINE, HIGH DOSE SEASONAL, PRESERVATIVE FREE [14256 CPT(R)] Follow-up Instructions Return in about 3 months (around 3/6/2018). Introducing [facility]! [facility] introduces [employer] patient portal. Now you can access parts of your medical record, email your doctor's office, and request medication refills online. 1. In your internet browser, go to [email or web address] 2. Click on the First Time User? Click Here link in the Sign In box. You will see the New Member Sign Up page. 3. Enter your [employer] Access Code exactly as it appears below. You will not need to use this code after youve completed the sign-up process. If you do not sign up before the expiration date, you must request a new code. · [employer] Access Code: [email or web address] Expires: 1/21/2018  2:54 PM 
 
4. Enter the last four digits of your Social Security Number (xxxx) and Date of Birth (mm/dd/yyyy) as indicated and click Submit. You will be taken to the next sign-up page. 5. Create a [employer] ID. This will be your [employer] login ID and cannot be changed, so think of one that is secure and easy to remember. 6. Create a [employer] password. You can change your password at any time. 7. Enter your Password Reset Question and Answer. This can be used at a later time if you forget your password. 8. Enter your e-mail address. You will receive e-mail notification when new information is available in [address] 9. Click Sign Up. You can now view and download portions of your medical record. 10. Click the Download Summary menu link to download a portable copy of your medical information. If you have questions, please visit the Frequently Asked Questions section of the [employer] website. Remember, [employer] is NOT to be used for urgent needs. For medical emergencies, dial 911. Now available from your iPhone and Android! Please provide this summary of care documentation to your next provider. Your primary care clinician is listed as Jane. If you have any questions after today's visit, please call [phone].

## 2017-12-06 NOTE — PROGRESS NOTES
Pa Neville is a 71 y.o. female who presents for routine immunizations. She denies any symptoms , reactions or allergies that would exclude them from being immunized today. Risks and adverse reactions were discussed and the VIS was given to them. All questions were addressed. She was observed for 15 min post injection. There were no reactions observed.     Alta Paige LPN

## 2017-12-06 NOTE — PROGRESS NOTES
1. Have you been to the ER, urgent care clinic since your last visit? Hospitalized since your last visit? No    2. Have you seen or consulted any other health care providers outside of the [address] [place] [address] since your last visit? Include any pap smears or colon screening. No     Chief Complaint   Patient presents with    Hypertension     3 month follow up    Cholesterol Problem     3 month follow up    Diabetes     3 month follow up    Cold Symptoms     coughing, congestion since Friday     Fasting    [facility] exam done 6/2017 with [facility] Dr. Chandler Arevalo. Colonoscopy has not been done since 10/2016.  Patient not sure when she is to go back, she will call gastrologist.

## 2017-12-06 NOTE — PROGRESS NOTES
Chief Complaint   Patient presents with    Hypertension     3 month follow up    Cholesterol Problem     3 month follow up    Diabetes     3 month follow up    Cold Symptoms     coughing, congestion since Friday       SUBJECTIVE:    Althea Apley is a 71 y.o. female who returns in follow-up of her medical problems include hypertension, diabetes, hyperlipidemia, CKD, DJD, and paroxysmal atrial fibrillation. She has developed some head and nasal congestion over the past few days with postnasal drainage and resultant cough. She notes no fevers or chills no shortness of breath no other cardiovascular commands. She denies any GI/ claims. There are no headaches or neurologic events. She has no arthritic complaints or other complaints on complete review of systems. She is taking medications following a diet trying exercise regular. Current Outpatient Prescriptions   Medication Sig Dispense Refill    GUAIFENESIN/PSEUDOEPHEDRNE HCL (MUCINEX D PO) Take  by mouth as needed.  azithromycin (ZITHROMAX) 250 mg tablet Take 1 Tab by mouth See Admin Instructions for 5 days. Take 2 tablets the first day, then 1 tablet daily for the next four days. 6 Tab 0    hydroCHLOROthiazide (HYDRODIURIL) 25 mg tablet TAKE 1 TABLET EVERY DAY 30 Tab 11    amLODIPine (NORVASC) 2.5 mg tablet TAKE 1 TABLET EVERY DAY 30 Tab 11    DICLOFENAC SODIUM OP Take 1 Tab by mouth two (2) times daily as needed. Indications: 75 MG      MULTIVIT,TH IRON,OTHER MIN (COMPLETE MULTIVITAMIN PO) Take 1 Tab by mouth daily. Indications: Complete multivitamin Women's 50+      calcium-cholecalciferol, D3, (CALTRATE 600+D) tablet Take 1 Tab by mouth daily.  levothyroxine (SYNTHROID) 50 mcg tablet Take 50 mcg by mouth Daily (before breakfast).  nateglinide (STARLIX) 120 mg tablet Take 120 mg by mouth Before breakfast, lunch, and dinner.        Past Medical History:   Diagnosis Date    Acquired hypothyroidism 7/26/2017    CKD (chronic kidney disease) 7/26/2017    Crohn's disease ([facility]     Diabetes ([facility]     Hypertension     On statin therapy 7/26/2017    Post-menopausal 7/26/2017     Past Surgical History:   Procedure Laterality Date    BREAST SURGERY PROCEDURE UNLISTED      cyst removed right    HX GI      tumor from colon removed    HX ORTHOPAEDIC      right wrist    [place] COLONOSCOPY W/BIOPSY SINGLE/MULTIPLE  4/9/2014          Allergies   Allergen Reactions    Lisinopril Cough       REVIEW OF SYSTEMS:  General: negative for - chills or fever, or weight loss or gain  ENT: negative for - headaches, or tinnitus.   Possibly had nasal congestion with postnasal drainage and cough  Eyes: no blurred or visual changes  Neck: No stiffness or swollen nodes  Respiratory: negative for - cough, hemoptysis, shortness of breath or wheezing  Cardiovascular : negative for - chest pain, edema, palpitations or shortness of breath  Gastrointestinal: negative for - abdominal pain, blood in stools, heartburn or nausea/vomiting  Genito-Urinary: no dysuria, trouble voiding, or hematuria  Musculoskeletal: negative for - gait disturbance, joint pain, joint stiffness or joint swelling  Neurological: no TIA or stroke symptoms  Hematologic: no bruises, no bleeding  Lymphatic: no swollen glands  Integument: no lumps, mole changes, nail changes or rash  Endocrine:no malaise/lethargy poly uria or polydipsia or unexpected weight changes        Social History     Social History    Marital status:      Spouse name: N/A    Number of children: N/A    Years of education: N/A     Social History Main Topics    Smoking status: Former Smoker     Quit date: 1979    Smokeless tobacco: Never Used    Alcohol use Yes      Comment: occasionally    Drug use: No    Sexual activity: No     Other Topics Concern    None     Social History Narrative     Family History   Problem Relation Age of Onset    Diabetes Mother     Heart Failure Mother     Cancer Father stomach       OBJECTIVE:     Visit Vitals    /73 (BP 1 Location: Left arm, BP Patient Position: Sitting)    Pulse 95    Temp 98.4 °F (36.9 °C) (Oral)    Resp 16    Ht 5' 7\" (1.702 m)    Wt 178 lb 12.8 oz (81.1 kg)    SpO2 94%    BMI 28 kg/m2     CONSTITUTIONAL:   well nourished, appears age appropriate  EYES: sclera anicteric, PERRL, EOMI  ENMT: Nares inflamed and edematous, moist mucous membranes, pharynx clear  NECK: supple. Thyroid normal, No JVD or bruits  RESPIRATORY: Chest: clear to ascultation and percussion, normal inspiratory effort  CARDIOVASCULAR: Heart: regular rate and rhythm no murmurs, rubs or gallops, PMI not displaced, No thrills  GASTROINTESTINAL: Abdomen: non distended, soft, non tender, bowel sounds normal  HEMATOLOGIC: no purpura, petechiae or bruising  LYMPHATIC: No lymph node enlargemant  MUSCULOSKELETAL: Extremities: no edema or active synovitis, pulse 1+. No diabetic foot changes noted  INTEGUMENT: No unusual rashes or suspicious skin lesions noted. Nails appear normal.  PERIPHERAL VASCULAR: normal pulses femoral, PT and DP  NEUROLOGIC: non-focal exam, A & O X 3. Normal distal sensation and proprioception all toes both feet  PSYCHIATRIC:, appropriate affect     ASSESSMENT:   1. Essential hypertension    2. Mixed hyperlipidemia    3. Controlled type 2 diabetes mellitus without complication, without long-term current use of insulin (HCC)    4. Stage 2 chronic kidney disease    5. Primary osteoarthritis involving multiple joints    6. PAF (paroxysmal atrial fibrillation) ([facility]    7. Acute non-recurrent maxillary sinusitis    8. Encounter for immunization    9. Effects of exposure to external cause, initial encounter      Impression  1. Hypertension that is well controlled continue current therapy reviewed with her  2. Hyperlipidemia we will see what that status is make adjustments reviewed prior labs with her  3.   Diabetes repeat status pending reviewed prior labs will make adjustments if necessary  4. CKD repeat status pending reviewed prior labs  5. DJD currently stable on present treatment  6. Atrial fibrillation paroxysmal no recent symptoms  7. Acute sinusitis we will treat this with a Z-Pablito  Flu shot given today. Labs are pending as noted will make further recommendation based on those. Follow stable continue same and I will recheck her again in 3 months or sooner should there be a problem. PLAN:  .  Orders Placed This Encounter    Influenza virus vaccine (FLUZONE HIGH-DOSE) 65 years and older (22639)    HEPATITIS C AB    AMB POC LIPID PROFILE    AMB POC COMPREHENSIVE METABOLIC PANEL    AMB POC HEMOGLOBIN A1C    GUAIFENESIN/PSEUDOEPHEDRNE HCL (MUCINEX D PO)    azithromycin (ZITHROMAX) 250 mg tablet         ATTENTION:   This medical record was transcribed using an electronic medical records system. Although proofread, it may and can contain electronic and spelling errors. Other human spelling and other errors may be present. Corrections may be executed at a later time. Please feel free to contact us for any clarifications as needed. Follow-up Disposition:  Return in about 3 months (around 3/6/2018). No results found for any visits on 12/06/17. Dhruv Quezada MD    The patient verbalized understanding of the problems and plans as explained.

## 2017-12-07 LAB
HBA1C MFR BLD HPLC: 5.8 % (ref 4.5–5.7)
HCV AB S/CO SERPL IA: 0.1 S/CO RATIO (ref 0–0.9)

## 2018-03-05 DIAGNOSIS — E03.9 ACQUIRED HYPOTHYROIDISM: Primary | ICD-10-CM

## 2018-03-05 RX ORDER — LEVOTHYROXINE SODIUM 50 UG/1
TABLET ORAL
Qty: 30 TAB | Refills: 11 | Status: SHIPPED | OUTPATIENT
Start: 2018-03-05 | End: 2019-02-28 | Stop reason: SDUPTHER

## 2018-03-20 DIAGNOSIS — M13.0 ARTHRITIS OF MULTIPLE SITES: Primary | ICD-10-CM

## 2018-03-20 RX ORDER — DICLOFENAC SODIUM 75 MG/1
TABLET, DELAYED RELEASE ORAL
Qty: 60 TAB | Refills: 11 | Status: SHIPPED | OUTPATIENT
Start: 2018-03-20 | End: 2019-04-22 | Stop reason: SDUPTHER

## 2018-03-29 ENCOUNTER — OFFICE VISIT (OUTPATIENT)
Dept: INTERNAL MEDICINE CLINIC | Age: 73
End: 2018-03-29

## 2018-03-29 VITALS
SYSTOLIC BLOOD PRESSURE: 116 MMHG | HEIGHT: 67 IN | TEMPERATURE: 98.6 F | HEART RATE: 81 BPM | DIASTOLIC BLOOD PRESSURE: 72 MMHG | OXYGEN SATURATION: 96 % | BODY MASS INDEX: 29.38 KG/M2 | WEIGHT: 187.2 LBS

## 2018-03-29 DIAGNOSIS — K50.80 CROHN'S DISEASE OF BOTH SMALL AND LARGE INTESTINE WITHOUT COMPLICATION (HCC): ICD-10-CM

## 2018-03-29 DIAGNOSIS — M15.9 PRIMARY OSTEOARTHRITIS INVOLVING MULTIPLE JOINTS: ICD-10-CM

## 2018-03-29 DIAGNOSIS — I48.0 PAF (PAROXYSMAL ATRIAL FIBRILLATION) (HCC): ICD-10-CM

## 2018-03-29 DIAGNOSIS — E78.2 MIXED HYPERLIPIDEMIA: ICD-10-CM

## 2018-03-29 DIAGNOSIS — N18.2 CONTROLLED TYPE 2 DIABETES MELLITUS WITH STAGE 2 CHRONIC KIDNEY DISEASE, WITHOUT LONG-TERM CURRENT USE OF INSULIN (HCC): ICD-10-CM

## 2018-03-29 DIAGNOSIS — N18.2 STAGE 2 CHRONIC KIDNEY DISEASE: ICD-10-CM

## 2018-03-29 DIAGNOSIS — E03.9 ACQUIRED HYPOTHYROIDISM: ICD-10-CM

## 2018-03-29 DIAGNOSIS — I10 ESSENTIAL HYPERTENSION: Primary | ICD-10-CM

## 2018-03-29 DIAGNOSIS — E11.22 CONTROLLED TYPE 2 DIABETES MELLITUS WITH STAGE 2 CHRONIC KIDNEY DISEASE, WITHOUT LONG-TERM CURRENT USE OF INSULIN (HCC): ICD-10-CM

## 2018-03-29 LAB
ALBUMIN SERPL-MCNC: 4.3 G/DL (ref 3.9–5.4)
ALKALINE PHOS POC: 78 U/L (ref 38–126)
ALT SERPL-CCNC: 39 U/L (ref 9–52)
AST SERPL-CCNC: 30 U/L (ref 14–36)
BUN BLD-MCNC: 24 MG/DL (ref 7–17)
CALCIUM BLD-MCNC: 9.6 MG/DL (ref 8.4–10.2)
CHLORIDE BLD-SCNC: 103 MMOL/L (ref 98–107)
CHOLEST SERPL-MCNC: 181 MG/DL (ref 0–200)
CK (CPK) POC: 268 U/L (ref 30–135)
CO2 POC: 31 MMOL/L (ref 22–32)
CREAT BLD-MCNC: 0.7 MG/DL (ref 0.7–1.2)
EGFR (POC): 86.6
GLUCOSE POC: 127 MG/DL (ref 65–105)
HBA1C MFR BLD HPLC: 6.2 % (ref 4.5–5.7)
HDLC SERPL-MCNC: 63 MG/DL (ref 35–130)
LDL CHOLESTEROL POC: 72.6 MG/DL (ref 0–130)
POTASSIUM SERPL-SCNC: 4.8 MMOL/L (ref 3.6–5)
PROT SERPL-MCNC: 7.3 G/DL (ref 6.3–8.2)
SODIUM SERPL-SCNC: 142 MMOL/L (ref 137–145)
TCHOL/HDL RATIO (POC): 2.9 (ref 0–4)
TOTAL BILIRUBIN POC: 0.8 MG/DL (ref 0.2–1.3)
TRIGL SERPL-MCNC: 227 MG/DL (ref 0–200)
VLDLC SERPL CALC-MCNC: 45.4 MG/DL

## 2018-03-29 NOTE — MR AVS SNAPSHOT
[address] [address] [phone] Patient: Gilmer Thomas MRN: [id number] :1945 Visit Information Date & Time Provider Department Dept. Phone Encounter #  
 3/29/2018  8:20 AM Priyanka Echols MD [address] [place] [address] [phone] [id number] Follow-up Instructions Return in about 3 months (around 2018). Follow-up and Disposition History Your Appointments 2018  9:40 AM  
FOLLOW UP 10 with Priyanka Echols MD  
[facility] ([address]) Appt Note: [address] [address] [address] [address] Upcoming Health Maintenance Date Due  
 EYE EXAM RETINAL OR DILATED Q1 3/7/1956 DTaP/Tdap/Td series (1 - Tdap) 3/7/1967 ZOSTER VACCINE AGE 60> 2006 GLAUCOMA SCREENING Q2Y 3/7/2011 COLONOSCOPY 2013 HEMOGLOBIN A1C Q6M 2018 MICROALBUMIN Q1 2018 MEDICARE YEARLY EXAM 2018 FOOT EXAM Q1 2018 LIPID PANEL Q1 2018 BREAST CANCER SCRN MAMMOGRAM 2019 Allergies as of 3/29/2018  Review Complete On: 3/29/2018 By: Priyanka Echols MD  
  
 Severity Noted Reaction Type Reactions Lisinopril  2017    Cough Current Immunizations  Reviewed on 2017 Name Date Influenza High Dose Vaccine PF 2017 Influenza Vaccine 10/1/2016, 10/6/2015 Pneumococcal Conjugate (PCV-13) 10/1/2015 Pneumococcal Polysaccharide (PPSV-23) 2011, 2011 Not reviewed this visit You Were Diagnosed With   
  
 Codes Comments Essential hypertension    -  Primary ICD-10-CM: I10 
ICD-9-CM: 401.9 Controlled type 2 diabetes mellitus with stage 2 chronic kidney disease, without long-term current use of insulin (HCC)     ICD-10-CM: E11.22, N18.2 ICD-9-CM: 250.40, 585.2 Mixed hyperlipidemia     ICD-10-CM: E78.2 ICD-9-CM: 272.2 Primary osteoarthritis involving multiple joints     ICD-10-CM: M15.0 ICD-9-CM: 715.09   
 PAF (paroxysmal atrial fibrillation) (HCC)     ICD-10-CM: I48.0 ICD-9-CM: 427.31 Stage 2 chronic kidney disease     ICD-10-CM: N18.2 ICD-9-CM: 438. 2 Acquired hypothyroidism     ICD-10-CM: E03.9 ICD-9-CM: 244.9 Crohn's disease of both small and large intestine without complication ([facility]     YNJ-48-KK: K50.80 ICD-9-CM: 573. 2 Vitals BP Pulse Temp Height(growth percentile) Weight(growth percentile) SpO2  
 116/72 (BP 1 Location: Left arm, BP Patient Position: Sitting) 81 98.6 °F (37 °C) (Oral) 5' 7\" (1.702 m) 187 lb 3.2 oz (84.9 kg) 96% BMI OB Status Smoking Status 29.32 kg/m2 Postmenopausal Former Smoker Vitals History BMI and BSA Data Body Mass Index Body Surface Area  
 29.32 kg/m 2 2 m 2 Preferred Pharmacy Pharmacy Name Phone [facility] [phone] Your Updated Medication List  
  
   
This list is accurate as of 3/29/18  9:10 AM.  Always use your most recent med list. amLODIPine 2.5 mg tablet Commonly known as:  Schuyler Nearing TAKE 1 TABLET EVERY DAY  
  
 calcium-cholecalciferol (D3) tablet Commonly known as:  CALTRATE 600+D Take 1 Tab by mouth daily. COMPLETE MULTIVITAMIN PO Take 1 Tab by mouth daily. Indications: Complete multivitamin Women's 50+ * DICLOFENAC SODIUM OP Take 1 Tab by mouth two (2) times daily as needed. Indications: 75 MG  
  
 * diclofenac EC 75 mg EC tablet Commonly known as:  VOLTAREN  
TAKE 1 TABLET TWICE A DAY WITH FOOD FOR PAIN AND INFLAMMATION  
  
 hydroCHLOROthiazide 25 mg tablet Commonly known as:  HYDRODIURIL  
TAKE 1 TABLET EVERY DAY  
  
 levothyroxine 50 mcg tablet Commonly known as:  SYNTHROID  
TAKE 1 TABLET EVERY DAY  
 MUCINEX D PO Take  by mouth as needed. nateglinide 120 mg tablet Commonly known as:  Elicia Ready Take 120 mg by mouth Before breakfast, lunch, and dinner. * Notice: This list has 2 medication(s) that are the same as other medications prescribed for you. Read the directions carefully, and ask your doctor or other care provider to review them with you. We Performed the Following AMB POC CK (CPK) [90940 CPT(R)] AMB POC COMPREHENSIVE METABOLIC PANEL [51387 CPT(R)] AMB POC HEMOGLOBIN A1C [71386 CPT(R)] AMB POC LIPID PROFILE [67795 CPT(R)]  DIABETES FOOT EXAM [HM7 Custom] Follow-up Instructions Return in about 3 months (around 6/29/2018). Patient Instructions Arthritis: Care Instructions Your Care Instructions Arthritis, also called osteoarthritis, is a breakdown of the cartilage that cushions your joints. When the cartilage wears down, your bones rub against each other. This causes pain and stiffness. Many people have some arthritis as they age. Arthritis most often affects the joints of the spine, hands, hips, knees, or feet. You can take simple measures to protect your joints, ease your pain, and help you stay active. Follow-up care is a key part of your treatment and safety. Be sure to make and go to all appointments, and call your doctor if you are having problems. It's also a good idea to know your test results and keep a list of the medicines you take. How can you care for yourself at home? · Stay at a healthy weight. Being overweight puts extra strain on your joints. · Talk to your doctor or physical therapist about exercises that will help ease joint pain. ¨ Stretch. You may enjoy gentle forms of yoga to help keep your joints and muscles flexible. ¨ Walk instead of jog. Other types of exercise that are less stressful on the joints include riding a bicycle, swimming, ginna chi, or water exercise. ¨ Lift weights. Strong muscles help reduce stress on your joints. Stronger thigh muscles, for example, take some of the stress off of the knees and hips. Learn the right way to lift weights so you do not make joint pain worse. · Take your medicines exactly as prescribed. Call your doctor if you think you are having a problem with your medicine. · Take pain medicines exactly as directed. ¨ If the doctor gave you a prescription medicine for pain, take it as prescribed. ¨ If you are not taking a prescription pain medicine, ask your doctor if you can take an over-the-counter medicine. · Use a cane, crutch, walker, or another device if you need help to get around. These can help rest your joints. You also can use other things to make life easier, such as a higher toilet seat and padded handles on kitchen utensils. · Do not sit in low chairs, which can make it hard to get up. · Put heat or cold on your sore joints as needed. Use whichever helps you most. You also can take turns with hot and cold packs. ¨ Apply heat 2 or 3 times a day for 20 to 30 minutes-using a heating pad, hot shower, or hot pack-to relieve pain and stiffness. ¨ Put ice or a cold pack on your sore joint for 10 to 20 minutes at a time. Put a thin cloth between the ice and your skin. When should you call for help? Call your doctor now or seek immediate medical care if: 
? · You have sudden swelling, warmth, or pain in any joint. ? · You have joint pain and a fever or rash. ? · You have such bad pain that you cannot use a joint. ? Watch closely for changes in your health, and be sure to contact your doctor if: 
? · You have mild joint symptoms that continue even with more than 6 weeks of care at home. ? · You have stomach pain or other problems with your medicine. Where can you learn more? Go to http://michel-brittny.info/. Enter C726 in the search box to learn more about \"Arthritis: Care Instructions. \" 
 Current as of: October 31, 2016 Content Version: 11.4 © 5979-9874 [email or web address]. Care instructions adapted under license by [employer] (which disclaims liability or warranty for this information). If you have questions about a medical condition or this instruction, always ask your healthcare professional. [address] any warranty or liability for your use of this information. Patient Instructions History Introducing \[facility]\"" & HEALTH SERVICES! Isra Barrientos introduces [employer] patient portal. Now you can access parts of your medical record, email your doctor's office, and request medication refills online. 1. In your internet browser, go to [email or web address] 2. Click on the First Time User? Click Here link in the Sign In box. You will see the New Member Sign Up page. 3. Enter your [employer] Access Code exactly as it appears below. You will not need to use this code after youve completed the sign-up process. If you do not sign up before the expiration date, you must request a new code. · [employer] Access Code: [email or web address] Expires: 6/27/2018  9:10 AM 
 
4. Enter the last four digits of your Social Security Number (xxxx) and Date of Birth (mm/dd/yyyy) as indicated and click Submit. You will be taken to the next sign-up page. 5. Create a [employer] ID. This will be your [employer] login ID and cannot be changed, so think of one that is secure and easy to remember. 6. Create a [employer] password. You can change your password at any time. 7. Enter your Password Reset Question and Answer. This can be used at a later time if you forget your password. 8. Enter your e-mail address. You will receive e-mail notification when new information is available in [address] 9. Click Sign Up. You can now view and download portions of your medical record. 10. Click the Download Summary menu link to download a portable copy of your medical information. If you have questions, please visit the Frequently Asked Questions section of the [employer] website. Remember, [employer] is NOT to be used for urgent needs. For medical emergencies, dial 911. Now available from your iPhone and Android! Please provide this summary of care documentation to your next provider. Your primary care clinician is listed as Jane. If you have any questions after today's visit, please call [phone].

## 2018-03-29 NOTE — PROGRESS NOTES
1. Have you been to the ER, urgent care clinic since your last visit? Hospitalized since your last visit? No    2. Have you seen or consulted any other health care providers outside of the [address] [place] [address] since your last visit? Include any pap smears or colon screening. No       Chief Complaint   Patient presents with    Hypertension     3 mo. f/u    Diabetes     3 mo. f/u    Cholesterol Problem     3 mo.  f/u       Fasting

## 2018-03-29 NOTE — PROGRESS NOTES
Chief Complaint   Patient presents with    Hypertension     3 mo. f/u    Diabetes     3 mo. f/u    Cholesterol Problem     3 mo. f/u       SUBJECTIVE:    Gilmer Thomas is a 68 y.o. female who returns in follow-up of medical problems include hypertension, diabetes, hyperlipidemia, paroxysmal atrial fibrillation, CKD stage II, and Crohn's disease with history of small bowel obstruction. She is taking her medications and trying to follow her diet and get some exercise. She denies any chest pain, shortness of breath, palpitations or cardiorespiratory complaint. She denies any current GI or  complaints. She denies any headaches or neurologic complaints. She denies any arthritic complaints at the present time and she has no other complaints on complete review of systems. Current Outpatient Prescriptions   Medication Sig Dispense Refill    diclofenac EC (VOLTAREN) 75 mg EC tablet TAKE 1 TABLET TWICE A DAY WITH FOOD FOR PAIN AND INFLAMMATION 60 Tab 11    levothyroxine (SYNTHROID) 50 mcg tablet TAKE 1 TABLET EVERY DAY 30 Tab 11    GUAIFENESIN/PSEUDOEPHEDRNE HCL (MUCINEX D PO) Take  by mouth as needed.  hydroCHLOROthiazide (HYDRODIURIL) 25 mg tablet TAKE 1 TABLET EVERY DAY 30 Tab 11    amLODIPine (NORVASC) 2.5 mg tablet TAKE 1 TABLET EVERY DAY 30 Tab 11    DICLOFENAC SODIUM OP Take 1 Tab by mouth two (2) times daily as needed. Indications: 75 MG      MULTIVIT,TH IRON,OTHER MIN (COMPLETE MULTIVITAMIN PO) Take 1 Tab by mouth daily. Indications: Complete multivitamin Women's 50+      calcium-cholecalciferol, D3, (CALTRATE 600+D) tablet Take 1 Tab by mouth daily.  nateglinide (STARLIX) 120 mg tablet Take 120 mg by mouth Before breakfast, lunch, and dinner.        Past Medical History:   Diagnosis Date    Acquired hypothyroidism 7/26/2017    CKD (chronic kidney disease) 7/26/2017    Cro[facility]'s disease ([facility]     Diabetes ([facility]     Hypertension     On statin therapy 7/26/2017    Post-menopausal 7/26/2017     Past Surgical History:   Procedure Laterality Date    BREAST SURGERY PROCEDURE UNLISTED      cyst removed right    HX GI      tumor from colon removed    HX ORTHOPAEDIC      right wrist    [place] COLONOSCOPY W/BIOPSY SINGLE/MULTIPLE  4/9/2014          Allergies   Allergen Reactions    Lisinopril Cough       REVIEW OF SYSTEMS:  General: negative for - chills or fever, or weight loss or gain  ENT: negative for - headaches, nasal congestion or tinnitus  Eyes: no blurred or visual changes  Neck: No stiffness or swollen nodes  Respiratory: negative for - cough, hemoptysis, shortness of breath or wheezing  Cardiovascular : negative for - chest pain, edema, palpitations or shortness of breath  Gastrointestinal: negative for - abdominal pain, blood in stools, heartburn or nausea/vomiting  Genito-Urinary: no dysuria, trouble voiding, or hematuria  Musculoskeletal: negative for - gait disturbance, joint pain, joint stiffness or joint swelling  Neurological: no TIA or stroke symptoms  Hematologic: no bruises, no bleeding  Lymphatic: no swollen glands  Integument: no lumps, mole changes, nail changes or rash  Endocrine:no malaise/lethargy poly uria or polydipsia or unexpected weight changes        Social History     Social History    Marital status:      Spouse name: N/A    Number of children: N/A    Years of education: N/A     Social History Main Topics    Smoking status: Former Smoker     Quit date: 1979    Smokeless tobacco: Never Used    Alcohol use Yes      Comment: occasionally    Drug use: No    Sexual activity: No     Other Topics Concern    None     Social History Narrative     Family History   Problem Relation Age of Onset    Diabetes Mother     Heart Failure Mother     Cancer Father      stomach       OBJECTIVE:     Visit Vitals    /72 (BP 1 Location: Left arm, BP Patient Position: Sitting)    Pulse 81    Temp 98.6 °F (37 °C) (Oral)    Ht 5' 7\" (1.702 m)    Wt 187 lb 3.2 oz (84.9 kg)    SpO2 96%    BMI 29.32 kg/m2     CONSTITUTIONAL:   well nourished, appears age appropriate  EYES: sclera anicteric, PERRL, EOMI  ENMT:nares clear, moist mucous membranes, pharynx clear  NECK: supple. Thyroid normal, No JVD or bruits  RESPIRATORY: Chest: clear to ascultation and percussion, normal inspiratory effort  CARDIOVASCULAR: Heart: regular rate and rhythm no murmurs, rubs or gallops, PMI not displaced, No thrills  GASTROINTESTINAL: Abdomen: non distended, soft, non tender, bowel sounds normal  HEMATOLOGIC: no purpura, petechiae or bruising  LYMPHATIC: No lymph node enlargemant  MUSCULOSKELETAL: Extremities: no edema or active synovitis, pulse 1+. No diabetic foot changes noted  INTEGUMENT: No unusual rashes or suspicious skin lesions noted. Nails appear normal.  PERIPHERAL VASCULAR: normal pulses femoral, PT and DP  NEUROLOGIC: non-focal exam, A & O X 3. Normal distal sensation and proprioception all toes both feet. PSYCHIATRIC:, appropriate affect     ASSESSMENT:   1. Essential hypertension    2. Controlled type 2 diabetes mellitus with stage 2 chronic kidney disease, without long-term current use of insulin ([address]    3. Mixed hyperlipidemia    4. Primary osteoarthritis involving multiple joints    5. PAF (paroxysmal atrial fibrillation) (HCC)    6. Stage 2 chronic kidney disease    7. Acquired hypothyroidism    8. Crohn's disease of both small and large intestine without complication ([address]      Impression  1. Hypertension that is controlled continue current therapy reviewed with her  2. Diabetes repeat status pending will make adjustments if needed reviewed prior labs with her  3. DJD that seems to be stable  4. Hyperlipidemia prior labs reviewed repeat status pending will adjust medications if necessary  5. Paroxysmal atrial fibrillation now in sinus rhythm  6. CKD stage II reviewed last numbers and repeat status pending  7. Hypothyroidism last numbers were good  8. Crohn's disease no evidence recurrence recently currently on no long-term treatment for that  Mild obesity discussed diet exercise weight reduction for [facility]  I will call her labs make further recommendations adjustments if necessary. Follow stable continue same and I will recheck her again in 3 months or sooner should there be a problem. PLAN:  .  Orders Placed This Encounter    REFERRAL FOR COLONOSCOPY    AMB POC LIPID PROFILE    AMB POC HEMOGLOBIN A1C    AMB POC COMPREHENSIVE METABOLIC PANEL    AMB POC CK (CPK)         ATTENTION:   This medical record was transcribed using an electronic medical records system. Although proofread, it may and can contain electronic and spelling errors. Other human spelling and other errors may be present. Corrections may be executed at a later time. Please feel free to contact us for any clarifications as needed. Follow-up Disposition:  Return in about 3 months (around 6/29/2018).     Results for orders placed or performed in visit on 03/29/18   AMB POC LIPID PROFILE   Result Value Ref Range    Cholesterol (POC)  0 - 200 mg/dL    Triglycerides (POC)  0 - 200 mg/dL    HDL Cholesterol (POC)  35 - 130 mg/dL    VLDL (POC)  MG/DL    LDL Cholesterol (POC)  0.0 - 130.0 MG/DL    TChol/HDL Ratio (POC)  0.0 - 4.0   AMB POC HEMOGLOBIN A1C   Result Value Ref Range    Hemoglobin A1c (POC)  4.5 - 5.7 %   AMB POC COMPREHENSIVE METABOLIC PANEL   Result Value Ref Range    GLUCOSE  65 - 105 mg/dL    BUN  7 - 17 mg/dL    Creatinine (POC)  0.7 - 1.2 mg/dL    Sodium (POC)  137 - 145 MMOL/L    Potassium (POC)  3.6 - 5.0 MMOL/L    CHLORIDE  98 - 107 MMOL/L    CO2  22 - 32 MMOL/L    CALCIUM  8.4 - 10.2 mg/dL    TOTAL PROTEIN  6.3 - 8.2 g/dL    ALBUMIN  3.9 - 5.4 g/dL    AST (POC)  14 - 36 U/L    ALT (POC)  9 - 52 U/L    ALKALINE PHOS  38 - 126 U/L    TOTAL BILIRUBIN  0.2 - 1.3 mg/dL    eGFR (POC)     AMB POC CK (CPK)   Result Value Ref Range    CK (CPK) (POC)  30 - 135 U/L Emily Phan MD    The patient verbalized understanding of the problems and plans as explained.

## 2018-03-29 NOTE — PATIENT INSTRUCTIONS
Arthritis: Care Instructions  Your Care Instructions  Arthritis, also called osteoarthritis, is a breakdown of the cartilage that cushions your joints. When the cartilage wears down, your bones rub against each other. This causes pain and stiffness. Many people have some arthritis as they age. Arthritis most often affects the joints of the spine, hands, hips, knees, or feet. You can take simple measures to protect your joints, ease your pain, and help you stay active. Follow-up care is a key part of your treatment and safety. Be sure to make and go to all appointments, and call your doctor if you are having problems. It's also a good idea to know your test results and keep a list of the medicines you take. How can you care for yourself at home? · Stay at a healthy weight. Being overweight puts extra strain on your joints. · Talk to your doctor or physical therapist about exercises that will help ease joint pain. ¨ Stretch. You may enjoy gentle forms of yoga to help keep your joints and muscles flexible. ¨ Walk instead of jog. Other types of exercise that are less stressful on the joints include riding a bicycle, swimming, ginna chi, or water exercise. ¨ Lift weights. Strong muscles help reduce stress on your joints. Stronger thigh muscles, for example, take some of the stress off of the knees and hips. Learn the right way to lift weights so you do not make joint pain worse. · Take your medicines exactly as prescribed. Call your doctor if you think you are having a problem with your medicine. · Take pain medicines exactly as directed. ¨ If the doctor gave you a prescription medicine for pain, take it as prescribed. ¨ If you are not taking a prescription pain medicine, ask your doctor if you can take an over-the-counter medicine. · Use a cane, crutch, walker, or another device if you need help to get around. These can help rest your joints.  You also can use other things to make life easier, such as a higher toilet seat and padded handles on kitchen utensils. · Do not sit in low chairs, which can make it hard to get up. · Put heat or cold on your sore joints as needed. Use whichever helps you most. You also can take turns with hot and cold packs. ¨ Apply heat 2 or 3 times a day for 20 to 30 minutes-using a heating pad, hot shower, or hot pack-to relieve pain and stiffness. ¨ Put ice or a cold pack on your sore joint for 10 to 20 minutes at a time. Put a thin cloth between the ice and your skin. When should you call for help? Call your doctor now or seek immediate medical care if:  ? · You have sudden swelling, warmth, or pain in any joint. ? · You have joint pain and a fever or rash. ? · You have such bad pain that you cannot use a joint. ? Watch closely for changes in your health, and be sure to contact your doctor if:  ? · You have mild joint symptoms that continue even with more than 6 weeks of care at home. ? · You have stomach pain or other problems with your medicine. Where can you learn more? Go to http://michel-brittny.info/. Enter Y720 in the search box to learn more about \"Arthritis: Care Instructions. \"  Current as of: October 31, 2016  Content Version: 11.4  © 2146-6682 [employer]. Care instructions adapted under license by [employer] (which disclaims liability or warranty for this information). If you have questions about a medical condition or this instruction, always ask your healthcare professional. [place] [address] any warranty or liability for your use of this information.

## 2018-04-30 ENCOUNTER — ANESTHESIA EVENT (OUTPATIENT)
Dept: ENDOSCOPY | Age: 73
End: 2018-04-30
Payer: MEDICARE

## 2018-04-30 NOTE — ANESTHESIA PREPROCEDURE EVALUATION
Anesthetic History   No history of anesthetic complications            Review of Systems / Medical History  Patient summary reviewed, nursing notes reviewed and pertinent labs reviewed    Pulmonary  Within defined limits              Comments: Distant smoking hx, quit in 1979   Neuro/Psych   Within defined limits           Cardiovascular    Hypertension        Dysrhythmias : atrial fibrillation  Hyperlipidemia    Exercise tolerance: >4 METS  Comments:  EKG: AFib, rate 98     ECHO: 35-40% EF,mod diffuse hypokinesis, mild TR   GI/Hepatic/Renal         Renal disease (CKD, stage 2)      Comments: Hx of polyps    Hx of crohn's Endo/Other    Diabetes: type 2  Hypothyroidism  Arthritis     Other Findings            Physical Exam    Airway  Mallampati: II  TM Distance: 4 - 6 cm  Neck ROM: normal range of motion   Mouth opening: Normal     Cardiovascular  Regular rate and rhythm,  S1 and S2 normal,  no murmur, click, rub, or gallop             Dental  No notable dental hx       Pulmonary  Breath sounds clear to auscultation               Abdominal  GI exam deferred       Other Findings            Anesthetic Plan    ASA: 3  Anesthesia type: total IV anesthesia and general          Induction: Intravenous  Anesthetic plan and risks discussed with: Patient

## 2018-05-01 ENCOUNTER — HOSPITAL ENCOUNTER (OUTPATIENT)
Age: 73
Setting detail: OUTPATIENT SURGERY
Discharge: HOME | End: 2018-05-01
Attending: INTERNAL MEDICINE | Admitting: INTERNAL MEDICINE
Payer: MEDICARE

## 2018-05-01 ENCOUNTER — ANESTHESIA (OUTPATIENT)
Dept: ENDOSCOPY | Age: 73
End: 2018-05-01
Payer: MEDICARE

## 2018-05-01 VITALS
DIASTOLIC BLOOD PRESSURE: 74 MMHG | OXYGEN SATURATION: 98 % | SYSTOLIC BLOOD PRESSURE: 129 MMHG | WEIGHT: 183.44 LBS | RESPIRATION RATE: 13 BRPM | HEART RATE: 76 BPM | TEMPERATURE: 98.2 F | BODY MASS INDEX: 28.79 KG/M2 | HEIGHT: 67 IN

## 2018-05-01 PROCEDURE — 74011250636 HC RX REV CODE- 250/636: Performed by: INTERNAL MEDICINE

## 2018-05-01 PROCEDURE — 74011250636 HC RX REV CODE- 250/636

## 2018-05-01 PROCEDURE — 88305 TISSUE EXAM BY PATHOLOGIST: CPT | Performed by: INTERNAL MEDICINE

## 2018-05-01 PROCEDURE — 77030009426 HC FCPS BIOP ENDOSC BSC -B: Performed by: INTERNAL MEDICINE

## 2018-05-01 PROCEDURE — 74011000250 HC RX REV CODE- 250

## 2018-05-01 PROCEDURE — 76060000031 HC ANESTHESIA FIRST 0.5 HR: Performed by: INTERNAL MEDICINE

## 2018-05-01 PROCEDURE — 76040000019: Performed by: INTERNAL MEDICINE

## 2018-05-01 RX ORDER — LIDOCAINE HYDROCHLORIDE 20 MG/ML
INJECTION, SOLUTION EPIDURAL; INFILTRATION; INTRACAUDAL; PERINEURAL AS NEEDED
Status: DISCONTINUED | OUTPATIENT
Start: 2018-05-01 | End: 2018-05-01 | Stop reason: HOSPADM

## 2018-05-01 RX ORDER — SODIUM CHLORIDE 0.9 % (FLUSH) 0.9 %
5-10 SYRINGE (ML) INJECTION EVERY 8 HOURS
Status: DISCONTINUED | OUTPATIENT
Start: 2018-05-01 | End: 2018-05-01 | Stop reason: HOSPADM

## 2018-05-01 RX ORDER — SODIUM CHLORIDE 0.9 % (FLUSH) 0.9 %
5-10 SYRINGE (ML) INJECTION AS NEEDED
Status: DISCONTINUED | OUTPATIENT
Start: 2018-05-01 | End: 2018-05-01 | Stop reason: HOSPADM

## 2018-05-01 RX ORDER — NATEGLINIDE 120 MG/1
TABLET ORAL
Qty: 90 TAB | Refills: 12 | Status: SHIPPED | OUTPATIENT
Start: 2018-05-01 | End: 2019-06-18 | Stop reason: SDUPTHER

## 2018-05-01 RX ORDER — DEXTROMETHORPHAN/PSEUDOEPHED 2.5-7.5/.8
1.2 DROPS ORAL
Status: DISCONTINUED | OUTPATIENT
Start: 2018-05-01 | End: 2018-05-01 | Stop reason: HOSPADM

## 2018-05-01 RX ORDER — FLUMAZENIL 0.1 MG/ML
0.2 INJECTION INTRAVENOUS
Status: DISCONTINUED | OUTPATIENT
Start: 2018-05-01 | End: 2018-05-01 | Stop reason: HOSPADM

## 2018-05-01 RX ORDER — SODIUM CHLORIDE 9 MG/ML
100 INJECTION, SOLUTION INTRAVENOUS CONTINUOUS
Status: DISCONTINUED | OUTPATIENT
Start: 2018-05-01 | End: 2018-05-01 | Stop reason: HOSPADM

## 2018-05-01 RX ORDER — ATROPINE SULFATE 0.1 MG/ML
0.5 INJECTION INTRAVENOUS
Status: DISCONTINUED | OUTPATIENT
Start: 2018-05-01 | End: 2018-05-01 | Stop reason: HOSPADM

## 2018-05-01 RX ORDER — PROPOFOL 10 MG/ML
INJECTION, EMULSION INTRAVENOUS AS NEEDED
Status: DISCONTINUED | OUTPATIENT
Start: 2018-05-01 | End: 2018-05-01 | Stop reason: HOSPADM

## 2018-05-01 RX ORDER — EPINEPHRINE 0.1 MG/ML
1 INJECTION INTRACARDIAC; INTRAVENOUS
Status: DISCONTINUED | OUTPATIENT
Start: 2018-05-01 | End: 2018-05-01 | Stop reason: HOSPADM

## 2018-05-01 RX ORDER — MIDAZOLAM HYDROCHLORIDE 1 MG/ML
1-2 INJECTION, SOLUTION INTRAMUSCULAR; INTRAVENOUS
Status: DISCONTINUED | OUTPATIENT
Start: 2018-05-01 | End: 2018-05-01 | Stop reason: HOSPADM

## 2018-05-01 RX ADMIN — PROPOFOL 120 MG: 10 INJECTION, EMULSION INTRAVENOUS at 14:31

## 2018-05-01 RX ADMIN — LIDOCAINE HYDROCHLORIDE 40 MG: 20 INJECTION, SOLUTION EPIDURAL; INFILTRATION; INTRACAUDAL; PERINEURAL at 14:19

## 2018-05-01 RX ADMIN — SODIUM CHLORIDE 100 ML/HR: 900 INJECTION, SOLUTION INTRAVENOUS at 14:04

## 2018-05-01 NOTE — H&P
Dorina Verdugo MD  [facility], [address], [address] [facility]  [phone]  [email or web address]      Gastroenterology Outpatient History and Physical    Patient: Marco López    Physician: Ken Batista MD    Vital Signs: Blood pressure 122/78, pulse 96, temperature 98.6 °F (37 °C), resp. rate 16, height 5' 7\" (1.702 m), weight 83.2 kg (183 lb 7 oz), SpO2 97 %. Allergies: Allergies   Allergen Reactions    Lisinopril Cough       Chief Complaint: Crohn's disease    History of Present Illness: Here for follow up colonoscopy for Crohn's disease.     History:  Past Medical History:   Diagnosis Date    Acquired hypothyroidism 7/26/2017    CKD (chronic kidney disease) 7/26/2017    [facility]     Diabetes [facility]     Hypertension     On statin therapy 7/26/2017    Post-menopausal 7/26/2017      Past Surgical History:   Procedure Laterality Date    BREAST SURGERY PROCEDURE UNLISTED      cyst removed right    HX GI      tumor from colon removed    HX ORTHOPAEDIC      right wrist    HX OTHER SURGICAL      Bowel Obstruction    [place] COLONOSCOPY W/BIOPSY SINGLE/MULTIPLE  4/9/2014           Social History     Social History    Marital status:      Spouse name: N/A    Number of children: N/A    Years of education: N/A     Social History Main Topics    Smoking status: Former Smoker     Packs/day: 1.00     Quit date: 1979    Smokeless tobacco: Never Used    Alcohol use Yes      Comment: 2 drinks per month     Drug use: No    Sexual activity: No     Other Topics Concern    None     Social History Narrative      Family History   Problem Relation Age of Onset    Diabetes Mother     Heart Failure Mother     Cancer Father      stomach      Patient Active Problem List   Diagnosis Code    SBO (small bowel obstruction) ([facility] K56.609    Crohn disease ([facility] K50.90    Essential hypertension I10    Controlled type 2 diabetes mellitus with stage 2 chronic kidney disease, without long-term current use of insulin ([facility] E11.22, N18.2    Mixed hyperlipidemia E78.2    Primary osteoarthritis involving multiple joints M15.0    PAF (paroxysmal atrial fibrillation) ([facility] I48.0    Post-menopausal Z78.0    On statin therapy Z79.899    Acquired hypothyroidism E03.9    Stage 2 chronic kidney disease N18.2    Medicare annual wellness visit, initial Z00.00    Colon cancer screening Z12.11    Acute bronchitis J20.9    Acute non-recurrent maxillary sinusitis J01.00         Medications:   Prior to Admission medications    Medication Sig Start Date End Date Taking? Authorizing Provider   diclofenac EC (VOLTAREN) 75 mg EC tablet TAKE 1 TABLET TWICE A DAY WITH FOOD FOR PAIN AND INFLAMMATION 3/20/18  Yes Akbar Tinajero MD   levothyroxine (SYNTHROID) 50 mcg tablet TAKE 1 TABLET EVERY DAY 3/5/18  Yes Akbar Tinajero MD   hydroCHLOROthiazide (HYDRODIURIL) 25 mg tablet TAKE 1 TABLET EVERY DAY 11/6/17  Yes Akbar Tinajero MD   amLODIPine (NORVASC) 2.5 mg tablet TAKE 1 TABLET EVERY DAY 10/5/17  Yes Akbar Tinajero MD   MULTIVIT,TH IRON,OTHER MIN (COMPLETE MULTIVITAMIN PO) Take 1 Tab by mouth daily. Indications: Complete multivitamin Women's 50+   Yes Cordelia Queen MD   calcium-cholecalciferol, D3, (CALTRATE 600+D) tablet Take 1 Tab by mouth daily. Yes Cordelia Queen MD   nateglinide (STARLIX) 120 mg tablet Take 120 mg by mouth Before breakfast, lunch, and dinner. Yes Historical Provider   GUAIFENESIN/PSEUDOEPHEDRNE HCL (MUCINEX D PO) Take  by mouth as needed. Historical Provider   DICLOFENAC SODIUM OP Take 1 Tab by mouth two (2) times daily as needed.  Indications: 75 MG    Historical Provider       Physical Exam:     General: well developed, well nourished   HEENT: unremarkable   Heart: regular rhythm no mumur    Lungs: clear   Abdominal:  benign   Neurological: unremarkable   Extremities: no edema Findings/Diagnosis: History of Crohn's disease    Plan of Care/Planned Procedure: Colonoscopy with  monitored anesthesia care sedation       Signed:  Samara Tyler MD 5/1/2018

## 2018-05-01 NOTE — IP AVS SNAPSHOT
[address] [place] [address] 
[phone] Patient: Mayra Jack MRN: [id number] :1945 About your hospitalization You were admitted on:  May 1, 2018 You last received care in the:  [facility] You were discharged on:  May 1, 2018 Why you were hospitalized Your primary diagnosis was:  Not on File Follow-up Information Follow up With Details Comments Contact Info MD Sandi Riley [facility] 
[phone] Your Scheduled Appointments 2018  9:40 AM EDT FOLLOW UP 10 with MD Agustín Riley [facility] ([facility]) [address] [address] [phone] Discharge Orders None A check monique indicates which time of day the medication should be taken. My Medications CONTINUE taking these medications Instructions Each Dose to Equal  
 Morning Noon Evening Bedtime  
 amLODIPine 2.5 mg tablet Commonly known as:  Rennymarcus Holly Your last dose was: Your next dose is: TAKE 1 TABLET EVERY DAY  
     
   
   
   
  
 calcium-cholecalciferol (D3) tablet Commonly known as:  CALTRATE 600+D Your last dose was: Your next dose is: Take 1 Tab by mouth daily. 1 Tab COMPLETE MULTIVITAMIN PO Your last dose was: Your next dose is: Take 1 Tab by mouth daily. Indications: Complete multivitamin Women's 50+ 1 Tab * DICLOFENAC SODIUM OP Your last dose was: Your next dose is: Take 1 Tab by mouth two (2) times daily as needed. Indications: 75 MG  
 1 Tab * diclofenac EC 75 mg EC tablet Commonly known as:  VOLTAREN Your last dose was: Your next dose is: TAKE 1 TABLET TWICE A DAY WITH FOOD FOR PAIN AND INFLAMMATION  
     
   
   
   
  
 hydroCHLOROthiazide 25 mg tablet Commonly known as:  HYDRODIURIL Your last dose was: Your next dose is: TAKE 1 TABLET EVERY DAY  
     
   
   
   
  
 levothyroxine 50 mcg tablet Commonly known as:  SYNTHROID Your last dose was: Your next dose is: TAKE 1 TABLET EVERY DAY  
     
   
   
   
  
 MUCINEX D PO Your last dose was: Your next dose is: Take  by mouth as needed. nateglinide 120 mg tablet Commonly known as:  Isael Padilla Your last dose was: Your next dose is: Take 120 mg by mouth Before breakfast, lunch, and dinner. 120 mg  
    
   
   
   
  
 * Notice: This list has 2 medication(s) that are the same as other medications prescribed for you. Read the directions carefully, and ask your doctor or other care provider to review them with you. Discharge Instructions Falguni Back MD 
[facility], [address] [place] [address] 
[phone] 
[email or web address] Person [phone] 
1945 COLON DISCHARGE INSTRUCTIONS Discomfort: 
Redness at IV site- apply warm compress to area; if redness or soreness persist- contact your physician There may be a slight amount of blood passed from the rectum Gaseous discomfort- walking, belching will help relieve any discomfort You may not operate a vehicle for 12 hours You may not engage in an occupation involving machinery or appliances for rest of today You may not drink alcoholic beverages for at least 12 hours Avoid making any critical decisions for at least 24 hour DIET: 
 Regular diet.  however -  remember your colon is empty and a heavy meal will produce gas.  
 Avoid these foods:  vegetables, fried / greasy foods, carbonated drinks for today ACTIVITY: 
You may resume your normal daily activities it is recommended that you spend the remainder of the day resting -  avoid any strenuous activity. CALL M.D. ANY SIGN OF: Increasing pain, nausea, vomiting Abdominal distension (swelling) New increased bleeding (oral or rectal) Fever (chills) Pain in chest area Bloody discharge from nose or mouth Shortness of breath COLONOSCOPY FINDINGS: 
Your colonoscopy showed: inactive Crohn's which was biopsied. Follow-up Instructions: 
 Call Dr. Latoya Del Toro if any questions or problems. Telephone # [phone] Dr. Helder Torres office will notify you of the biopsy results within 7 to 10 days. Should have a repeat colonoscopy in 3 years. ACO Transitions of Care Introducing [employer] [address] offers a voluntary care coordination program to provide high quality service and care to [facility] fee-for-service beneficiaries. Oskar Wang was designed to help you enhance your health and well-being through the following services: ? Transitions of Care  support for individuals who are transitioning from one care setting to another (example: Hospital to home). ? Chronic and Complex Care Coordination  support for individuals and caregivers of those with serious or chronic illnesses or with more than one chronic (ongoing) condition and those who take a number of different medications. If you meet specific medical criteria, a [facility] [address] may call you directly to coordinate your care with your primary care physician and your other care providers. For questions about the [facility] programs, please, contact your physicians office. For general questions or additional information about Accountable Care Organizations: 
Please visit www.medicare.gov/acos. html or call 1-800-MEDICARE (7-355.906.8310) [employer] users should call 4-406.231.7786. Introducing [facility]! [employer] introduces WorldMate patient portal. Now you can access parts of your medical record, email your doctor's office, and request medication refills online. 1. In your internet browser, go to [email or web address] 2. Click on the First Time User? Click Here link in the Sign In box. You will see the New Member Sign Up page. 3. Enter your WorldMate Access Code exactly as it appears below. You will not need to use this code after youve completed the sign-up process. If you do not sign up before the expiration date, you must request a new code. · WorldMate Access Code: [email or web address] Expires: 6/27/2018  9:10 AM 
 
4. Enter the last four digits of your Social Security Number (xxxx) and Date of Birth (mm/dd/yyyy) as indicated and click Submit. You will be taken to the next sign-up page. 5. Create a WorldMate ID. This will be your WorldMate login ID and cannot be changed, so think of one that is secure and easy to remember. 6. Create a WorldMate password. You can change your password at any time. 7. Enter your Password Reset Question and Answer. This can be used at a later time if you forget your password. 8. Enter your e-mail address. You will receive e-mail notification when new information is available in [address] 9. Click Sign Up. You can now view and download portions of your medical record. 10. Click the Download Summary menu link to download a portable copy of your medical information. If you have questions, please visit the Frequently Asked Questions section of the WorldMate website. Remember, WorldMate is NOT to be used for urgent needs. For medical emergencies, dial 911. Now available from your iPhone and Android! Introducing Yonathan Mcconnell As a [employer] patient, I wanted to make you aware of our electronic visit tool called Yonathan Mcconnell. [employer]" allows you to connect within minutes with a medical provider 24 hours a day, seven days a week via a mobile device or tablet or logging into a secure website from your computer. You can access [employer] from anywhere in the [place]. A virtual visit might be right for you when you have a simple condition and feel like you just dont want to get out of bed, or cant get away from work for an appointment, when your regular [employer] provider is not available (evenings, weekends or holidays), or when youre out of town and need minor care. Electronic visits cost only $49 and if the [employer]" provider determines a prescription is needed to treat your condition, one can be electronically transmitted to a nearby pharmacy*. Please take a moment to enroll today if you have not already done so. The enrollment process is free and takes just a few minutes. To enroll, please download the [employer]" estefania to your tablet or phone, or visit [email or web address] to enroll on your computer. And, as an [address] [place] [address] patient with a [employer]deal account, the results of your visits will be scanned into your electronic medical record and your primary care provider will be able to view the scanned results. We urge you to continue to see your regular [employer]polis provider for your ongoing medical care. And while your primary care provider may not be the one available when you seek a [employer] virtual visit, the peace of mind you get from getting a real diagnosis real time can be priceless. For more information on [employer], view our Frequently Asked Questions (FAQs) at [email or web address]. Sincerely, 
 
Jessie Noel MD 
Chief Medical Officer Richard David *:  certain medications cannot be prescribed via [employer] Providers Seen During Your Hospitalization Provider Specialty Primary office phone Yahaira Momin MD Gastroenterology [phone] Your Primary Care Physician (PCP) Primary Care Physician Office Phone Office Fax Andrea Shirley [phone] You are allergic to the following Allergen Reactions Lisinopril Cough Recent Documentation Height Weight BMI OB Status Smoking Status 1.702 m 83.2 kg 28.73 kg/m2 Postmenopausal Former Smoker Emergency Contacts Name Discharge Info Relation Home Work Mobile Cryer,Linda DISCHARGE CAREGIVER [3] Other Relative [6] [phone] Liliya Lucasjeremie CAREGIVER [3] Friend [5] [phone] Patient Belongings The following personal items are in your possession at time of discharge: 
  Dental Appliances: None  Visual Aid: Glasses Please provide this summary of care documentation to your next provider. Signatures-by signing, you are acknowledging that this After Visit Summary has been reviewed with you and you have received a copy. Patient Signature:  ____________________________________________________________ Date:  ____________________________________________________________  
  
Lucy Hauser Provider Signature:  ____________________________________________________________ Date:  ____________________________________________________________

## 2018-05-01 NOTE — PROGRESS NOTES
Anesthesia reports 120 mg Propofol, 40 mg Lidocaine and 450 mL NS given during procedure. Received report from anesthesia staff on vital signs and status of patient.

## 2018-05-01 NOTE — DISCHARGE INSTRUCTIONS
Lazaro Hill MD  [facility], [address]  [place], [address] [facility]  [phone]  [email or web address]    Noble Ibarra  [id number]  1945    COLON DISCHARGE INSTRUCTIONS  Discomfort:  Redness at IV site- apply warm compress to area; if redness or soreness persist- contact your physician  There may be a slight amount of blood passed from the rectum  Gaseous discomfort- walking, belching will help relieve any discomfort  You may not operate a vehicle for 12 hours  You may not engage in an occupation involving machinery or appliances for rest of today  You may not drink alcoholic beverages for at least 12 hours  Avoid making any critical decisions for at least 24 hour  DIET:   Regular diet. - however -  remember your colon is empty and a heavy meal will produce gas. Avoid these foods:  vegetables, fried / greasy foods, carbonated drinks for today      ACTIVITY:  You may resume your normal daily activities it is recommended that you spend the remainder of the day resting -  avoid any strenuous activity. CALL M.D. ANY SIGN OF:   Increasing pain, nausea, vomiting  Abdominal distension (swelling)  New increased bleeding (oral or rectal)  Fever (chills)  Pain in chest area  Bloody discharge from nose or mouth  Shortness of breath     COLONOSCOPY FINDINGS:  Your colonoscopy showed: inactive Crohn's which was biopsied. Follow-up Instructions:   Call Dr. Lazaro Hill if any questions or problems. Telephone # [phone]  Dr. Collette Eaton office will notify you of the biopsy results within 7 to 10 days. Should have a repeat colonoscopy in 3 years.

## 2018-05-01 NOTE — PROCEDURES
[facility]                  Colonoscopy Operative Report    5/1/2018      Juan Luis Elam  [id number]  1945    Procedure Type:   Colonoscopy --screening     Indications:    Crohn's colitis (screening colon too)     Pre-operative Diagnosis: see indication above    Post-operative Diagnosis:  See findings below    :  Santo Blum MD    Referring Provider: Silvia Zamorano MD      Sedation:  MAC anesthesia Propofol    Pre-Procedural Exam:      Airway: clear,  No airway problems anticipated  Heart: RRR, without gallops or rubs  Lungs: clear bilaterally without wheezes, crackles, or rhonchi  Abdomen: soft, nontender, nondistended, bowel sounds present  Mental Status: awake, alert and oriented to person, place and time     Procedure Details:  After informed consent was obtained with all risks and benefits of procedure explained and preoperative exam completed, the patient was taken to the endoscopy suite and placed in the left lateral decubitus position. Upon sequential sedation as per above, a digital rectal exam was performed . The Olympus videocolonoscope  was inserted in the rectum and carefully advanced to the surgical anastomosis . The quality of preparation was good. The colonoscope was slowly withdrawn with careful evaluation between folds. Retroflexion in the rectum was completed demonstrating internal and external hemorrhoids. Findings:   Rectum: Grade 1 internal and external hemorrhoid(s)  Mucosal scarring, lack of vascularity--biopsied;  Sigmoid: Mucosal scarring, lack of vascularity, multiple pseudopolyps--biopsied; Descending Colon:  Mucosal scarring, lack of vascularity, multiple pseudopolyps--biopsied;  Transverse Colon:  Mucosal scarring, lack of vascularity, multiple pseudopolyps--biopsied;  Ascending Colon: surgically absent  Cecum: surgically absent  Terminal Ileum: not intubated      Specimen Removed:  1. transverse colon biopsies  2. descending colon biopsies  3. sigmoid biopsies  4. rectal biopsies    Complications: None. EBL:  None. Impression:    Inactive Crohn's colitis with extensive scarring. Recommendations: --Await pathology. , -Repeat colonoscopy in 3 years. Regular diet. Resume normal medication(s). Discharge Disposition:  Home in the company of a  when able to ambulate. Cayden Kuhn MD    5/1/2018     CHAZ Rosa MD  [facility], [address]  [phone]  [email or web address]

## 2018-05-01 NOTE — ANESTHESIA POSTPROCEDURE EVALUATION
Post-Anesthesia Evaluation and Assessment    Patient: Rodolfo Biswas MRN: [id number]  SSN: [id number]    YOB: 1945  Age: 68 y.o. Sex: female       Cardiovascular Function/Vital Signs  Visit Vitals    /74    Pulse 76    Temp 36.8 °C (98.2 °F)    Resp 13    Ht 5' 7\" (1.702 m)    Wt 83.2 kg (183 lb 7 oz)    SpO2 98%    BMI 28.73 kg/m2       Patient is status post total IV anesthesia anesthesia for Procedure(s):  COLONOSCOPY  COLON BIOPSY. Nausea/Vomiting: None    Postoperative hydration reviewed and adequate. Pain:  Pain Scale 1: Numeric (0 - 10) (05/01/18 1452)  Pain Intensity 1: 0 (05/01/18 1452)   Managed    Neurological Status: At baseline    Mental Status and Level of Consciousness: Arousable    Pulmonary Status:   O2 Device: Room air (05/01/18 1456)   Adequate oxygenation and airway patent    Complications related to anesthesia: None    Post-anesthesia assessment completed.  No concerns    Signed By: Kenny Moser DO     May 1, 2018

## 2018-05-01 NOTE — ROUTINE PROCESS
Jackie Melva  1945  [id number]    Situation:  Verbal report received from: Mary Ann  Procedure: Procedure(s):  COLONOSCOPY  COLON BIOPSY    Background:    Preoperative diagnosis: HISTORY OF POLYPS  Postoperative diagnosis: history of Crohn's Disease    :  Dr. Joseph Thompson  Assistant(s): Endoscopy Technician-1: Ezequiel Adkins  Endoscopy RN-1: Debbie Valencia RN    Specimens:   ID Type Source Tests Collected by Time Destination   1 : Transverse Colon bx Preservative Colon, Transverse  Franca Mitchell MD 5/1/2018 1424 Pathology   2 : Descending Colon bx Preservative Colon, Descending  Franca Mitchell MD 5/1/2018 1425 Pathology   3 : Sigmoid Colon bx Preservative Sigmoid  Franca Mitchell MD 5/1/2018 1426 Pathology   4 : Rectum bx Preservative Rectum  Franca Mitchell MD 5/1/2018 1427 Pathology     H. Pylori  no    Assessment:  Intra-procedure medications     Anesthesia gave intra-procedure sedation and medications, see anesthesia flow sheet yes    Intravenous fluids: NS@ KVO     Vital signs stable     Abdominal assessment: round and soft     Recommendation:  Discharge patient per MD order.     Permission to share finding with family or friend yes

## 2018-06-26 ENCOUNTER — OFFICE VISIT (OUTPATIENT)
Dept: INTERNAL MEDICINE CLINIC | Age: 73
End: 2018-06-26

## 2018-06-26 VITALS
RESPIRATION RATE: 17 BRPM | DIASTOLIC BLOOD PRESSURE: 70 MMHG | BODY MASS INDEX: 28.6 KG/M2 | HEIGHT: 67 IN | HEART RATE: 90 BPM | WEIGHT: 182.2 LBS | TEMPERATURE: 98.8 F | OXYGEN SATURATION: 97 % | SYSTOLIC BLOOD PRESSURE: 112 MMHG

## 2018-06-26 DIAGNOSIS — N18.2 STAGE 2 CHRONIC KIDNEY DISEASE: ICD-10-CM

## 2018-06-26 DIAGNOSIS — I48.0 PAF (PAROXYSMAL ATRIAL FIBRILLATION) (HCC): ICD-10-CM

## 2018-06-26 DIAGNOSIS — E78.2 MIXED HYPERLIPIDEMIA: ICD-10-CM

## 2018-06-26 DIAGNOSIS — I10 ESSENTIAL HYPERTENSION: Primary | ICD-10-CM

## 2018-06-26 DIAGNOSIS — N18.2 CONTROLLED TYPE 2 DIABETES MELLITUS WITH STAGE 2 CHRONIC KIDNEY DISEASE, WITHOUT LONG-TERM CURRENT USE OF INSULIN (HCC): ICD-10-CM

## 2018-06-26 DIAGNOSIS — E11.22 CONTROLLED TYPE 2 DIABETES MELLITUS WITH STAGE 2 CHRONIC KIDNEY DISEASE, WITHOUT LONG-TERM CURRENT USE OF INSULIN (HCC): ICD-10-CM

## 2018-06-26 DIAGNOSIS — M15.9 PRIMARY OSTEOARTHRITIS INVOLVING MULTIPLE JOINTS: ICD-10-CM

## 2018-06-26 LAB
ALBUMIN SERPL-MCNC: 4 G/DL (ref 3.9–5.4)
ALKALINE PHOS POC: 86 U/L (ref 38–126)
ALT SERPL-CCNC: 43 U/L (ref 9–52)
AST SERPL-CCNC: 30 U/L (ref 14–36)
BUN BLD-MCNC: 15 MG/DL (ref 7–17)
CALCIUM BLD-MCNC: 9.7 MG/DL (ref 8.4–10.2)
CHLORIDE BLD-SCNC: 101 MMOL/L (ref 98–107)
CHOLEST SERPL-MCNC: 184 MG/DL (ref 0–200)
CK (CPK) POC: 229 U/L (ref 30–135)
CO2 POC: 30 MMOL/L (ref 22–32)
CREAT BLD-MCNC: 0.8 MG/DL (ref 0.7–1.2)
EGFR (POC): 73.7
GLUCOSE POC: 121 MG/DL (ref 65–105)
HBA1C MFR BLD HPLC: 5.6 % (ref 4.5–5.7)
HDLC SERPL-MCNC: 62 MG/DL (ref 35–130)
LDL CHOLESTEROL POC: 90.8 MG/DL (ref 0–130)
POTASSIUM SERPL-SCNC: 4.9 MMOL/L (ref 3.6–5)
PROT SERPL-MCNC: 7.3 G/DL (ref 6.3–8.2)
SODIUM SERPL-SCNC: 142 MMOL/L (ref 137–145)
TCHOL/HDL RATIO (POC): 3 (ref 0–4)
TOTAL BILIRUBIN POC: 0.8 MG/DL (ref 0.2–1.3)
TRIGL SERPL-MCNC: 156 MG/DL (ref 0–200)
VLDLC SERPL CALC-MCNC: 31.2 MG/DL

## 2018-06-26 NOTE — MR AVS SNAPSHOT
[address] [facility] 
 
 
 [address] [address] [phone] Patient: Berhane Oconnor MRN: [id number] :1945 Visit Information Date & Time Provider Department Dept. Phone Encounter #  
 2018  9:40 AM Zeny Sanchez Annabrencallimeagan [id number] Your Appointments 2018 10:00 AM  
FOLLOW UP 10 with MD JOSE Sanchez [facility] ([address]) Appt Note: 3 MO FLP   yearly [address] [id number] [address] [facility] [address] [address] Upcoming Health Maintenance Date Due DTaP/Tdap/Td series (1 - Tdap) 3/7/1967 ZOSTER VACCINE AGE 60> 2006 EYE EXAM RETINAL OR DILATED Q1 2018 Influenza Age 5 to Adult 2018 MICROALBUMIN Q1 2018 MEDICARE YEARLY EXAM 2018 HEMOGLOBIN A1C Q6M 2018 FOOT EXAM Q1 3/29/2019 LIPID PANEL Q1 3/29/2019 GLAUCOMA SCREENING Q2Y 2019 BREAST CANCER SCRN MAMMOGRAM 2019 COLONOSCOPY 2021 Allergies as of 2018  Review Complete On: 2018 By: Tim Zavala MD  
  
 Severity Noted Reaction Type Reactions Lisinopril  2017    Cough Current Immunizations  Reviewed on 2017 Name Date Influenza High Dose Vaccine PF 2017 Influenza Vaccine 10/1/2016, 10/6/2015 Pneumococcal Conjugate (PCV-13) 10/1/2015 Pneumococcal Polysaccharide (PPSV-23) 2011, 2011 Not reviewed this visit You Were Diagnosed With   
  
 Codes Comments Essential hypertension    -  Primary ICD-10-CM: I10 
ICD-9-CM: 401.9 Controlled type 2 diabetes mellitus with stage 2 chronic kidney disease, without long-term current use of insulin (HCC)     ICD-10-CM: E11.22, N18.2 ICD-9-CM: 250.40, 585.2 Mixed hyperlipidemia     ICD-10-CM: E78.2 ICD-9-CM: 272.2 PAF (paroxysmal atrial fibrillation) (HCC)     ICD-10-CM: I48.0 ICD-9-CM: 427.31 Primary osteoarthritis involving multiple joints     ICD-10-CM: M15.0 ICD-9-CM: 715.09 Stage 2 chronic kidney disease     ICD-10-CM: N18.2 ICD-9-CM: 466. 2 Vitals BP Pulse Temp Resp Height(growth percentile) Weight(growth percentile) 112/70 (BP 1 Location: Left arm, BP Patient Position: Sitting) 90 98.8 °F (37.1 °C) (Oral) 17 5' 7\" (1.702 m) 182 lb 3.2 oz (82.6 kg) SpO2 BMI OB Status Smoking Status 97% 28.54 kg/m2 Postmenopausal Former Smoker Vitals History BMI and BSA Data Body Mass Index Body Surface Area 28.54 kg/m 2 1.98 m 2 Preferred Pharmacy Pharmacy Name Phone [facility] [phone] Your Updated Medication List  
  
   
This list is accurate as of 6/26/18 10:19 AM.  Always use your most recent med list. amLODIPine 2.5 mg tablet Commonly known as:  Sundra Steele TAKE 1 TABLET EVERY DAY  
  
 calcium-cholecalciferol (D3) tablet Commonly known as:  CALTRATE 600+D Take 1 Tab by mouth daily. COMPLETE MULTIVITAMIN PO Take 1 Tab by mouth daily. Indications: Complete multivitamin Women's 50+  
  
 diclofenac EC 75 mg EC tablet Commonly known as:  VOLTAREN  
TAKE 1 TABLET TWICE A DAY WITH FOOD FOR PAIN AND INFLAMMATION  
  
 hydroCHLOROthiazide 25 mg tablet Commonly known as:  HYDRODIURIL  
TAKE 1 TABLET EVERY DAY  
  
 levothyroxine 50 mcg tablet Commonly known as:  SYNTHROID  
TAKE 1 TABLET EVERY DAY  
  
 MUCINEX D PO Take  by mouth as needed. nateglinide 120 mg tablet Commonly known as:  Jodean Narrow TAKE 1 TABLET THREE TIMES DAILY BEFORE MEALS We Performed the Following AMB POC CK (CPK) [53469 CPT(R)] AMB POC COMPREHENSIVE METABOLIC PANEL [21449 CPT(R)] AMB POC HEMOGLOBIN A1C [35714 CPT(R)] AMB POC LIPID PROFILE [66091 CPT(R)] Introducing [facility]! Stacey Billingsley introduces [employer] patient portal. Now you can access parts of your medical record, email your doctor's office, and request medication refills online. 1. In your internet browser, go to [email or web address]://"[employer]". Keystok/"[employer]" 2. Click on the First Time User? Click Here link in the Sign In box. You will see the New Member Sign Up page. 3. Enter your [employer] Access Code exactly as it appears below. You will not need to use this code after youve completed the sign-up process. If you do not sign up before the expiration date, you must request a new code. · [employer] Access Code: [email or web address] Expires: 6/27/2018  9:10 AM 
 
4. Enter the last four digits of your Social Security Number (xxxx) and Date of Birth (mm/dd/yyyy) as indicated and click Submit. You will be taken to the next sign-up page. 5. Create a [employer] ID. This will be your [employer] login ID and cannot be changed, so think of one that is secure and easy to remember. 6. Create a [employer] password. You can change your password at any time. 7. Enter your Password Reset Question and Answer. This can be used at a later time if you forget your password. 8. Enter your e-mail address. You will receive e-mail notification when new information is available in [address] 9. Click Sign Up. You can now view and download portions of your medical record. 10. Click the Download Summary menu link to download a portable copy of your medical information. If you have questions, please visit the Frequently Asked Questions section of the [employer] website. Remember, [employer] is NOT to be used for urgent needs. For medical emergencies, dial 911. Now available from your iPhone and Android! Please provide this summary of care documentation to your next provider. Your primary care clinician is listed as Dexter Hawley.  If you have any questions after today's visit, please call [phone].

## 2018-06-26 NOTE — PATIENT INSTRUCTIONS
Atrial Fibrillation: Care Instructions  Your Care Instructions    Atrial fibrillation is an irregular and often fast heartbeat. Treating this condition is important for several reasons. It can cause blood clots, which can travel from your heart to your brain and cause a stroke. If you have a fast heartbeat, you may feel lightheaded, dizzy, and weak. An irregular heartbeat can also increase your risk for heart failure. Atrial fibrillation is often the result of another heart condition, such as high blood pressure or coronary artery disease. Making changes to improve your heart condition will help you stay healthy and active. Follow-up care is a key part of your treatment and safety. Be sure to make and go to all appointments, and call your doctor if you are having problems. It's also a good idea to know your test results and keep a list of the medicines you take. How can you care for yourself at home? Medicines  ? · Take your medicines exactly as prescribed. Call your doctor if you think you are having a problem with your medicine. You will get more details on the specific medicines your doctor prescribes. ? · If your doctor has given you a blood thinner to prevent a stroke, be sure you get instructions about how to take your medicine safely. Blood thinners can cause serious bleeding problems. ? · Do not take any vitamins, over-the-counter drugs, or herbal products without talking to your doctor first.   ? Lifestyle changes  ? · Do not smoke. Smoking can increase your chance of a stroke and heart attack. If you need help quitting, talk to your doctor about stop-smoking programs and medicines. These can increase your chances of quitting for good. ? · Eat a heart-healthy diet. ? · Stay at a healthy weight. Lose weight if you need to.   ? · Limit alcohol to 2 drinks a day for men and 1 drink a day for women. Too much alcohol can cause health problems. ? · Avoid colds and flu.  Get a pneumococcal vaccine shot. If you have had one before, ask your doctor whether you need another dose. Get a flu shot every year. If you must be around people with colds or flu, wash your hands often. Activity  ? · If your doctor recommends it, get more exercise. Walking is a good choice. Bit by bit, increase the amount you walk every day. Try for at least 30 minutes on most days of the week. You also may want to swim, bike, or do other activities. Your doctor may suggest that you join a cardiac rehabilitation program so that you can have help increasing your physical activity safely. ? · Start light exercise if your doctor says it is okay. Even a small amount will help you get stronger, have more energy, and manage stress. Walking is an easy way to get exercise. Start out by walking a little more than you did in the hospital. Gradually increase the amount you walk. ? · When you exercise, watch for signs that your heart is working too hard. You are pushing too hard if you cannot talk while you are exercising. If you become short of breath or dizzy or have chest pain, sit down and rest immediately. ? · Check your pulse regularly. Place two fingers on the artery at the palm side of your wrist, in line with your thumb. If your heartbeat seems uneven or fast, talk to your doctor. When should you call for help? Call 911 anytime you think you may need emergency care. For example, call if:  ? · You have symptoms of a heart attack. These may include:  ¨ Chest pain or pressure, or a strange feeling in the chest.  ¨ Sweating. ¨ Shortness of breath. ¨ Nausea or vomiting. ¨ Pain, pressure, or a strange feeling in the back, neck, jaw, or upper belly or in one or both shoulders or arms. ¨ Lightheadedness or sudden weakness. ¨ A fast or irregular heartbeat. After you call 911, the  may tell you to chew 1 adult-strength or 2 to 4 low-dose aspirin. Wait for an ambulance. Do not try to drive yourself.    ? · You have symptoms of a stroke. These may include:  ¨ Sudden numbness, tingling, weakness, or loss of movement in your face, arm, or leg, especially on only one side of your body. ¨ Sudden vision changes. ¨ Sudden trouble speaking. ¨ Sudden confusion or trouble understanding simple statements. ¨ Sudden problems with walking or balance. ¨ A sudden, severe headache that is different from past headaches. ? · You passed out (lost consciousness). ?Call your doctor now or seek immediate medical care if:  ? · You have new or increased shortness of breath. ? · You feel dizzy or lightheaded, or you feel like you may faint. ? · Your heart rate becomes irregular. ? · You can feel your heart flutter in your chest or skip heartbeats. Tell your doctor if these symptoms are new or worse. ? Watch closely for changes in your health, and be sure to contact your doctor if you have any problems. Where can you learn more? Go to http://michel-brittny.info/. Enter U020 in the search box to learn more about \"Atrial Fibrillation: Care Instructions. \"  Current as of: September 21, 2016  Content Version: 11.4  © 5284-6127 [employer]. Care instructions adapted under license by [employer] (which disclaims liability or warranty for this information). If you have questions about a medical condition or this instruction, always ask your healthcare professional. [address] any warranty or liability for your use of this information.

## 2018-06-26 NOTE — PROGRESS NOTES
Chief Complaint   Patient presents with    Hypertension     3 month follow up     Diabetes    Chronic Kidney Disease     1. Have you been to the ER, urgent care clinic since your last visit? Hospitalized since your last visit? No    2. Have you seen or consulted any other health care providers outside of the [facility] since your last visit? Include any pap smears or colon screening.  No     Fasting     Last eye exam-6/10/17 at [facility], has appointment scheduled this month 6/28/18

## 2018-06-28 ENCOUNTER — HOSPITAL ENCOUNTER (OUTPATIENT)
Dept: MAMMOGRAPHY | Age: 73
Discharge: HOME | End: 2018-06-28
Attending: INTERNAL MEDICINE
Payer: MEDICARE

## 2018-06-28 DIAGNOSIS — Z12.31 VISIT FOR SCREENING MAMMOGRAM: ICD-10-CM

## 2018-06-28 PROCEDURE — 77067 SCR MAMMO BI INCL CAD: CPT

## 2018-09-20 ENCOUNTER — OFFICE VISIT (OUTPATIENT)
Dept: INTERNAL MEDICINE CLINIC | Age: 73
End: 2018-09-20

## 2018-09-20 VITALS
HEIGHT: 67 IN | BODY MASS INDEX: 29.16 KG/M2 | TEMPERATURE: 97.5 F | RESPIRATION RATE: 18 BRPM | OXYGEN SATURATION: 99 % | SYSTOLIC BLOOD PRESSURE: 128 MMHG | DIASTOLIC BLOOD PRESSURE: 68 MMHG | WEIGHT: 185.8 LBS | HEART RATE: 71 BPM

## 2018-09-20 DIAGNOSIS — I10 ESSENTIAL HYPERTENSION: Primary | ICD-10-CM

## 2018-09-20 DIAGNOSIS — N18.2 STAGE 2 CHRONIC KIDNEY DISEASE: ICD-10-CM

## 2018-09-20 DIAGNOSIS — E11.22 CONTROLLED TYPE 2 DIABETES MELLITUS WITH STAGE 2 CHRONIC KIDNEY DISEASE, WITHOUT LONG-TERM CURRENT USE OF INSULIN (HCC): ICD-10-CM

## 2018-09-20 DIAGNOSIS — Z23 ENCOUNTER FOR IMMUNIZATION: ICD-10-CM

## 2018-09-20 DIAGNOSIS — E03.9 ACQUIRED HYPOTHYROIDISM: ICD-10-CM

## 2018-09-20 DIAGNOSIS — Z00.00 MEDICARE ANNUAL WELLNESS VISIT, SUBSEQUENT: ICD-10-CM

## 2018-09-20 DIAGNOSIS — N18.2 CONTROLLED TYPE 2 DIABETES MELLITUS WITH STAGE 2 CHRONIC KIDNEY DISEASE, WITHOUT LONG-TERM CURRENT USE OF INSULIN (HCC): ICD-10-CM

## 2018-09-20 DIAGNOSIS — M15.9 PRIMARY OSTEOARTHRITIS INVOLVING MULTIPLE JOINTS: ICD-10-CM

## 2018-09-20 DIAGNOSIS — I48.0 PAF (PAROXYSMAL ATRIAL FIBRILLATION) (HCC): ICD-10-CM

## 2018-09-20 DIAGNOSIS — K50.80 CROHN'S DISEASE OF BOTH SMALL AND LARGE INTESTINE WITHOUT COMPLICATION (HCC): ICD-10-CM

## 2018-09-20 DIAGNOSIS — E78.2 MIXED HYPERLIPIDEMIA: ICD-10-CM

## 2018-09-20 NOTE — PROGRESS NOTES
This is a Subsequent Medicare Annual Wellness Visit providing Personalized Prevention Plan Services (PPPS) (Performed 12 months after initial AWV and PPPS ) I have reviewed the patient's medical history in detail and updated the computerized patient record. She presents today for subsequent annual Medicare wellness examination screening questionnaire. She is also in follow-up of medical problems include hypertension, diabetes, hyperlipidemia, paroxysmal atrial fibrillation, Crohn's disease with prior small bowel obstruction, CKD stage II, DJD and other medical problems. She is taking medications and trying to follow her diet and get some exercise. She currently denies any chest pain, shortness breath, palpitations or cardiorespiratory complaints. She denies any GI or  complaints. She denies any headaches, dizziness or neurologic complaints. There are no current arthritic complaints and she has no other complaints on complete review of systems. History Past Medical History:  
Diagnosis Date  Acquired hypothyroidism 7/26/2017  CKD (chronic kidney disease) 7/26/2017  Cro[facility]'s disease ([facility]  Diabetes ([facility]  Hypertension  On statin therapy 7/26/2017  Post-menopausal 7/26/2017 Past Surgical History:  
Procedure Laterality Date  BREAST SURGERY PROCEDURE UNLISTED    
 cyst removed right  COLONOSCOPY N/A 5/1/2018 COLONOSCOPY performed by Juan Jose Jerry MD at [facility] ENDOSCOPY  COLONOSCOPY,DIAGNOSTIC  5/1/2018  HX BREAST BIOPSY Right   
 over 30 years  negative  HX GI    
 tumor from colon removed  HX ORTHOPAEDIC    
 right wrist  
 HX OTHER SURGICAL Bowel Obstruction  [place] COLONOSCOPY W/BIOPSY SINGLE/MULTIPLE  4/9/2014 Social History Substance Use Topics  Smoking status: Former Smoker Packs/day: 1.00 Quit date: 1979  Smokeless tobacco: Never Used  Alcohol use Yes Comment: 2 drinks per month Current Outpatient Prescriptions Medication Sig Dispense Refill  nateglinide (STARLIX) 120 mg tablet TAKE 1 TABLET THREE TIMES DAILY BEFORE MEALS 90 Tab 12  
 diclofenac EC (VOLTAREN) 75 mg EC tablet TAKE 1 TABLET TWICE A DAY WITH FOOD FOR PAIN AND INFLAMMATION 60 Tab 11  
 levothyroxine (SYNTHROID) 50 mcg tablet TAKE 1 TABLET EVERY DAY 30 Tab 11  
 GUAIFENESIN/PSEUDOEPHEDRNE HCL (MUCINEX D PO) Take  by mouth as needed.  hydroCHLOROthiazide (HYDRODIURIL) 25 mg tablet TAKE 1 TABLET EVERY DAY 30 Tab 11  
 amLODIPine (NORVASC) 2.5 mg tablet TAKE 1 TABLET EVERY DAY 30 Tab 11  
 MULTIVIT,TH IRON,OTHER MIN (COMPLETE MULTIVITAMIN PO) Take 1 Tab by mouth daily. Indications: Complete multivitamin Women's 50+  calcium-cholecalciferol, D3, (CALTRATE 600+D) tablet Take 1 Tab by mouth daily. Allergies Allergen Reactions  Lisinopril Cough Family History Problem Relation Age of Onset  Diabetes Mother  Heart Failure Mother  Cancer Father   
  stomach Patient Active Problem List  
 Diagnosis  Stage 2 chronic kidney disease  PAF (paroxysmal atrial fibrillation) ([address] Self Limiting  Essential hypertension  Controlled type 2 diabetes mellitus with stage 2 chronic kidney disease, without long-term current use of insulin ([address]  Mixed hyperlipidemia  Primary osteoarthritis involving multiple joints  Acquired hypothyroidism  Acute non-recurrent maxillary sinusitis  Acute bronchitis  Medicare annual wellness visit, subsequent  Colon cancer screening  Post-menopausal  
 On statin therapy  SBO (small bowel obstruction) ([address]  Crohn disease ([address] Patient Care Team: 
Umang Perkins MD as PCP - General (Internal Medicine) Depression Risk Factor Screening: PHQ over the last two weeks 9/20/2018 Little interest or pleasure in doing things Not at all Feeling down, depressed, irritable, or hopeless Not at all Total Score PHQ 2 0 Alcohol Risk Factor Screening: You do not drink alcohol or very rarely. Functional Ability and Level of Safety:  
 
Fall Risk Fall Risk Assessment, last 12 mths 6/26/2018 Able to walk? Yes Fall in past 12 months? No  
 
 
Hearing Loss  
mild Activities of Daily Living Self-care. ADL Assessment 12/6/2017 Feeding yourself No Help Needed Getting from bed to chair No Help Needed Getting dressed No Help Needed Bathing or showering No Help Needed Walk across the room (includes cane/walker) No Help Needed Using the telphone No Help Needed Taking your medications No Help Needed Preparing meals No Help Needed Managing money (expenses/bills) No Help Needed Moderately strenuous housework (laundry) No Help Needed Shopping for personal items (toiletries/medicines) No Help Needed Shopping for groceries No Help Needed Driving No Help Needed Climbing a flight of stairs No Help Needed Getting to places beyond walking distances No Help Needed Abuse Screen Patient is not abused Social History Social History Narrative Review of Systems ROS: 
 
Constitutional: She denies fevers, weight loss, sweats. Eyes: No blurred or double vision. ENT: No difficulty with swallowing, taste, speech or smell. NECK: no stiffness swelling or lymph node enlargement Respiratory: No cough wheezing or shortness of breath. Cardiovascular: Denies chest pain, palpitations, unexplained indigestion or syncope. Breast: She has noted no masses or lumps and no discharge or axillary swelling Gastrointestinal:  No changes in bowel movements, no abdominal pain, no bloating. Genitourinary: No discharge or abnormal bleeding or pain Extremities: No joint pain, stiffness or swelling. Neurological:  No numbness, tingling, burring paresthesias or loss of motor strength. No syncope, dizziness or frequent headache Skin:  No recent rashes or mole changes. Psychiatric/Behavioral:  Negative for depression. The patient is not nervous/anxious. HEMATOLOGIC: no easy bruising or bleeding gums Endocrine: no sweats of urinary frequency or excessive thirst 
 
Physical Examination Evaluation of Cognitive Function: 
Mood/affect:  happy Appearance: age appropriate Family member/caregiver input: none Visit Vitals  /68 (BP 1 Location: Left arm, BP Patient Position: Sitting)  Pulse 71  Temp 97.5 °F (36.4 °C) (Oral)  Resp 18  Ht 5' 7\" (1.702 m)  Wt 185 lb 12.8 oz (84.3 kg)  SpO2 99%  BMI 29.1 kg/m2 Vitals:  
 09/20/18 1128 BP: 128/68 Pulse: 71 Resp: 18 Temp: 97.5 °F (36.4 °C) TempSrc: Oral  
SpO2: 99% Weight: 185 lb 12.8 oz (84.3 kg) Height: 5' 7\" (1.702 m) PainSc:   0 - No pain PHYSICAL EXAM: 
 
General appearance - alert, well appearing, and in no distress Mental status - alert, oriented to person, place, and time HEENT: 
Ears - bilateral TM's and external ear canals clear Eyes - pupillary responses were normal.  Extraocular muscle function intact. Lids and conjunctiva not injected. Fundoscopic exam revealed sharp disc margins. eye movements intact Pharynx- clear with teeth in good repair. No masses were noted Neck - supple without thyromegaly or burit. No JVD noted Lungs - clear to auscultation and percussion Cardiac- normal rate, regular rhythm without murmurs. PMI not displaced. No gallop, rub or click Breast: deferred to GYN Abdomen - flat, soft, non-tender without palpable organomegaly or mass. No pulsatile mass was felt, and not bruit was heard. Bowel sounds were active  Female - deferred to GYN Rectal - deferred to GYN Extremities -  no clubbing cyanosis or edema Lymphatics - no palpable lymphadenopathy, no hepatosplenomegaly Peripheral vascular - Dorsalis pedis and posterior tibial pulses felt without difficulty Skin - no rash or unusual mole change noted Neurological - Cranial nerves II-XII grossly intact. Motor strength 5/5. DTR's 2+ and symmetric. Station and gait normal 
Back exam - full range of motion, no tenderness, palpable spasm or pain on motion Musculoskeletal - no joint tenderness, deformity or swelling Hematologic: no purpura, petechiae or bruising Results for orders placed or performed in visit on 06/26/18 AMB POC LIPID PROFILE Result Value Ref Range Cholesterol (POC) 184.0 0 - 200 mg/dL Triglycerides (POC) 156.0 0 - 200 mg/dL HDL Cholesterol (POC) 62.0 35 - 130 mg/dL VLDL (POC) 31.2 MG/DL  
 LDL Cholesterol (POC) 90.8 0.0 - 130.0 MG/DL TChol/HDL Ratio (POC) 3.0 0.0 - 4.0 AMB POC HEMOGLOBIN A1C Result Value Ref Range Hemoglobin A1c (POC) 5.6 4.5 - 5.7 % AMB POC COMPREHENSIVE METABOLIC PANEL Result Value Ref Range GLUCOSE 121.0 (A) 65 - 105 mg/dL BUN 15.0 7 - 17 mg/dL Creatinine (POC) 0.8 0.7 - 1.2 mg/dL Sodium (POC) 142.0 137 - 145 MMOL/L Potassium (POC) 4.9 3.6 - 5.0 MMOL/L  
 CHLORIDE 101.0 98 - 107 MMOL/L  
 CO2 30.0 22 - 32 MMOL/L  
 CALCIUM 9.7 8.4 - 10.2 mg/dL TOTAL PROTEIN 7.3 6.3 - 8.2 g/dL ALBUMIN 4.0 3.9 - 5.4 g/dL AST (POC) 30 14 - 36 U/L  
 ALT (POC) 43 9 - 52 U/L ALKALINE PHOS 86.0 38 - 126 U/L  
 TOTAL BILIRUBIN 0.8 0.2 - 1.3 mg/dL  
 eGFR (POC) 73.7 AMB POC CK (CPK) Result Value Ref Range CK (CPK) (POC) 229.0 (A) 30 - 135 U/L Advice/Referrals/Counseling Education and counseling provided: 
Are appropriate based on today's review and evaluation End-of-Life planning (with patient's consent) Pneumococcal Vaccine Influenza Vaccine Colorectal cancer screening tests Assessment/Plan ASSESSMENT:  
1. Essential hypertension 2. Controlled type 2 diabetes mellitus with stage 2 chronic kidney disease, without long-term current use of insulin ([address] 3. Mixed hyperlipidemia 4. PAF (paroxysmal atrial fibrillation) ([address] 5. Primary osteoarthritis involving multiple joints 6. Stage 2 chronic kidney disease 7. Acquired hypothyroidism 8. Crohn's disease of both small and large intestine without complication ([facility] 9. Encounter for immunization 10. Medicare annual wellness visit, subsequent Impression 1. Hypertension that is controlled to continue current therapy reviewed with her 2. Type 2 diabetes repeat status pending and prior labs reviewed on make adjustments if necessary. 3.  Hyperlipidemia prior labs reviewed and repeat status pending I will adjust if needed. 4.  Paroxysmal atrial fibrillation no recent symptoms 5. DJD that is stable 6. CKD stage II repeat status is pending 7. Hypothyroidism repeat status pending and prior labs reviewed on make adjustments if necessary. 8.  Crohn's disease no recent flares Flu shot given today. Medicare annual wellness examination and screening questionnaire is completed today. The results were reviewed with her and questions were answered. Lifestyle recommendations and modifications discussed and made. I will call with lab results and make further recommendations or adjustments if necessary. If all stable continue same and I will recheck her again in 3 months or sooner should there be a problem. PLAN: 
. Orders Placed This Encounter  Influenza Vaccine Inactivated (IIV)(FLUAD), Subunit, Adjuvanted, IM, (29999)  T4, FREE  
 METABOLIC PANEL, COMPREHENSIVE  
 TSH 3RD GENERATION  
 LIPID PANEL  
 CK  
 HEMOGLOBIN A1C WITH EAG  
 AMB POC COMPLETE CBC,AUTOMATED ENTER  AMB POC URINALYSIS DIP STICK AUTO W/ MICRO  AMB POC URINE, MICROALBUMIN, SEMIQUANT (1 RESULT) ATTENTION:  
This medical record was transcribed using an electronic medical records system. Although proofread, it may and can contain electronic and spelling errors. Other human spelling and other errors may be present. Corrections may be executed at a later time. Please feel free to contact us for any clarifications as needed. Follow-up Disposition: 
Return in about 3 months (around 12/20/2018). Chico Albarado MD 
 
Recommended healthy diet low in carbohydrates, fats, sodium and cholesterol. Recommended regular cardiovascular exercise 3-6 times per week for 30-60 minutes daily. Current Outpatient Prescriptions Medication Sig Dispense Refill  nateglinide (STARLIX) 120 mg tablet TAKE 1 TABLET THREE TIMES DAILY BEFORE MEALS 90 Tab 12  
 diclofenac EC (VOLTAREN) 75 mg EC tablet TAKE 1 TABLET TWICE A DAY WITH FOOD FOR PAIN AND INFLAMMATION 60 Tab 11  
 levothyroxine (SYNTHROID) 50 mcg tablet TAKE 1 TABLET EVERY DAY 30 Tab 11  
 GUAIFENESIN/PSEUDOEPHEDRNE HCL (MUCINEX D PO) Take  by mouth as needed.  hydroCHLOROthiazide (HYDRODIURIL) 25 mg tablet TAKE 1 TABLET EVERY DAY 30 Tab 11  
 amLODIPine (NORVASC) 2.5 mg tablet TAKE 1 TABLET EVERY DAY 30 Tab 11  
 MULTIVIT,TH IRON,OTHER MIN (COMPLETE MULTIVITAMIN PO) Take 1 Tab by mouth daily. Indications: Complete multivitamin Women's 50+  calcium-cholecalciferol, D3, (CALTRATE 600+D) tablet Take 1 Tab by mouth daily. No results found for any visits on 09/20/18. Verbal and written instructions (see AVS) provided. Patient expresses understanding of diagnosis and treatment plan.  
 
Chico Albarado MD

## 2018-09-20 NOTE — PROGRESS NOTES
Hedy Sherman is a 68 y.o. female Chief Complaint Patient presents with [facility] Annual Wellness Visit 1. Have you been to the ER, urgent care clinic since your last visit? Hospitalized since your last visit? No 
 
2. Have you seen or consulted any other health care providers outside of the [address] [place] [address] since your last visit? Include any pap smears or colon screening.  No

## 2018-09-20 NOTE — PATIENT INSTRUCTIONS
Medicare Wellness Visit, Female The best way to live healthy is to have a lifestyle where you eat a well-balanced diet, exercise regularly, limit alcohol use, and quit all forms of tobacco/nicotine, if applicable. Regular preventive services are another way to keep healthy. Preventive services (vaccines, screening tests, monitoring & exams) can help personalize your care plan, which helps you manage your own care. Screening tests can find health problems at the earliest stages, when they are easiest to treat. [facility] follows the current, evidence-based guidelines published by the [facility] ([facility]) when recommending preventive services for our patients. Because we follow these guidelines, sometimes recommendations change over time as research supports it. (For example, mammograms used to be recommended annually. Even though Medicare will still pay for an annual mammogram, the newer guidelines recommend a mammogram every two years for women of average risk.) Of course, you and your doctor may decide to screen more often for some diseases, based on your risk and your health status. Preventive services for you include: - Medicare offers their members a free annual wellness visit, which is time for you and your primary care provider to discuss and plan for your preventive service needs. Take advantage of this benefit every year! 
-All adults over the age of 72 should receive the recommended pneumonia vaccines. Current USPSTF guidelines recommend a series of two vaccines for the best pneumonia protection.  
-All adults should have a flu vaccine yearly and a tetanus vaccine every 10 years. All adults age 61 and older should receive a shingles vaccine once in their lifetime.   
-A bone mass density test is recommended when a woman turns 65 to screen for osteoporosis. This test is only recommended one time, as a screening. Some providers will use this same test as a disease monitoring tool if you already have osteoporosis. -All adults age 38-68 who are overweight should have a diabetes screening test once every three years.  
-Other screening tests and preventive services for persons with diabetes include: an eye exam to screen for diabetic retinopathy, a kidney function test, a foot exam, and stricter control over your cholesterol.  
-Cardiovascular screening for adults with routine risk involves an electrocardiogram (ECG) at intervals determined by your doctor.  
-Colorectal cancer screenings should be done for adults age 54-65 with no increased risk factors for colorectal cancer. There are a number of acceptable methods of screening for this type of cancer. Each test has its own benefits and drawbacks. Discuss with your doctor what is most appropriate for you during your annual wellness visit. The different tests include: colonoscopy (considered the best screening method), a fecal occult blood test, a fecal DNA test, and sigmoidoscopy. -Breast cancer screenings are recommended every other year for women of normal risk, age 54-69. 
-Cervical cancer screenings for women over age 72 are only recommended with certain risk factors.  
-All adults born between [facility] should be screened once for Hepatitis C. Here is a list of your current Health Maintenance items (your personalized list of preventive services) with a due date: 
Health Maintenance Due Topic Date Due  
 DTaP/Tdap/Td  (1 - Tdap) 03/07/1967  Shingles Vaccine  01/07/2006 [facility] Exam  06/22/2018  Flu Vaccine  08/01/2018  Albumin Urine Test  08/28/2018 [facility] Annual Well Visit  08/29/2018 Atrial Fibrillation: Care Instructions Your Care Instructions Atrial fibrillation is an irregular and often fast heartbeat. Treating this condition is important for several reasons.  It can cause blood clots, which can travel from your heart to your brain and cause a stroke. If you have a fast heartbeat, you may feel lightheaded, dizzy, and weak. An irregular heartbeat can also increase your risk for heart failure. Atrial fibrillation is often the result of another heart condition, such as high blood pressure or coronary artery disease. Making changes to improve your heart condition will help you stay healthy and active. Follow-up care is a key part of your treatment and safety. Be sure to make and go to all appointments, and call your doctor if you are having problems. It's also a good idea to know your test results and keep a list of the medicines you take. How can you care for yourself at home? Medicines 
  · Take your medicines exactly as prescribed. Call your doctor if you think you are having a problem with your medicine. You will get more details on the specific medicines your doctor prescribes.  
  · If your doctor has given you a blood thinner to prevent a stroke, be sure you get instructions about how to take your medicine safely. Blood thinners can cause serious bleeding problems.  
  · Do not take any vitamins, over-the-counter drugs, or herbal products without talking to your doctor first.  
 Lifestyle changes 
  · Do not smoke. Smoking can increase your chance of a stroke and heart attack. If you need help quitting, talk to your doctor about stop-smoking programs and medicines. These can increase your chances of quitting for good.  
  · Eat a heart-healthy diet.  
  · Stay at a healthy weight. Lose weight if you need to.  
  · Limit alcohol to 2 drinks a day for men and 1 drink a day for women. Too much alcohol can cause health problems.  
  · Avoid colds and flu. Get a pneumococcal vaccine shot. If you have had one before, ask your doctor whether you need another dose. Get a flu shot every year. If you must be around people with colds or flu, wash your hands often. Activity   · If your doctor recommends it, get more exercise. Walking is a good choice. Bit by bit, increase the amount you walk every day. Try for at least 30 minutes on most days of the week. You also may want to swim, bike, or do other activities. Your doctor may suggest that you join a cardiac rehabilitation program so that you can have help increasing your physical activity safely.  
  · Start light exercise if your doctor says it is okay. Even a small amount will help you get stronger, have more energy, and manage stress. Walking is an easy way to get exercise. Start out by walking a little more than you did in the hospital. Gradually increase the amount you walk.  
  · When you exercise, watch for signs that your heart is working too hard. You are pushing too hard if you cannot talk while you are exercising. If you become short of breath or dizzy or have chest pain, sit down and rest immediately.  
  · Check your pulse regularly. Place two fingers on the artery at the palm side of your wrist, in line with your thumb. If your heartbeat seems uneven or fast, talk to your doctor. When should you call for help? Call 911 anytime you think you may need emergency care. For example, call if: 
  · You have symptoms of a heart attack. These may include: ¨ Chest pain or pressure, or a strange feeling in the chest. 
¨ Sweating. ¨ Shortness of breath. ¨ Nausea or vomiting. ¨ Pain, pressure, or a strange feeling in the back, neck, jaw, or upper belly or in one or both shoulders or arms. ¨ Lightheadedness or sudden weakness. ¨ A fast or irregular heartbeat. After you call 911, the  may tell you to chew 1 adult-strength or 2 to 4 low-dose aspirin. Wait for an ambulance. Do not try to drive yourself.  
  · You have symptoms of a stroke. These may include: 
¨ Sudden numbness, tingling, weakness, or loss of movement in your face, arm, or leg, especially on only one side of your body. ¨ Sudden vision changes. ¨ Sudden trouble speaking. ¨ Sudden confusion or trouble understanding simple statements. ¨ Sudden problems with walking or balance. ¨ A sudden, severe headache that is different from past headaches.  
  · You passed out (lost consciousness).  
 Call your doctor now or seek immediate medical care if: 
  · You have new or increased shortness of breath.  
  · You feel dizzy or lightheaded, or you feel like you may faint.  
  · Your heart rate becomes irregular.  
  · You can feel your heart flutter in your chest or skip heartbeats. Tell your doctor if these symptoms are new or worse.  
 Watch closely for changes in your health, and be sure to contact your doctor if you have any problems. Where can you learn more? Go to http://michel-brittny.info/. Enter U020 in the search box to learn more about \"Atrial Fibrillation: Care Instructions. \" Current as of: December 6, 2017 Content Version: 11.7 © 0064-4779 [employer]. Care instructions adapted under license by [employer] (which disclaims liability or warranty for this information). If you have questions about a medical condition or this instruction, always ask your healthcare professional. [place] [address] any warranty or liability for your use of this information.

## 2018-09-20 NOTE — MR AVS SNAPSHOT
[address] [address] [phone] Patient: Mateusz Burgess MRN: [id number] :1945 Visit Information Date & Time Provider Department Dept. Phone Encounter #  
 2018 10:00 AM Amairani Lemon, [facility] [employer] [phone] [id number] Follow-up Instructions Return in about 3 months (around 2018). Your Appointments 2019  9:40 AM  
FOLLOW UP 10 with MD JOSE Jack [facility] (Tatyana Navarro) Appt Note: 3 month flp Sandi 71 [address] [phone] [address] [address] Upcoming Health Maintenance Date Due DTaP/Tdap/Td series (1 - Tdap) 3/7/1967 ZOSTER VACCINE AGE 60> 2006 EYE EXAM RETINAL OR DILATED Q1 2018 Influenza Age 5 to Adult 2018 HEMOGLOBIN A1C Q6M 2018 FOOT EXAM Q1 3/29/2019 GLAUCOMA SCREENING Q2Y 2019 LIPID PANEL Q1 2019 MICROALBUMIN Q1 2019 MEDICARE YEARLY EXAM 2019 BREAST CANCER SCRN MAMMOGRAM 2020 COLONOSCOPY 2021 Allergies as of 2018  Review Complete On: 2018 By: Amairani Lemon MD  
  
 Severity Noted Reaction Type Reactions Lisinopril  2017    Cough Current Immunizations  Reviewed on 2017 Name Date Influenza High Dose Vaccine PF 2017 Influenza Vaccine 10/1/2016, 10/6/2015 Influenza Vaccine (Tri) Adjuvanted 2018 Pneumococcal Conjugate (PCV-13) 10/1/2015 Pneumococcal Polysaccharide (PPSV-23) 2011, 2011 Not reviewed this visit You Were Diagnosed With   
  
 Codes Comments Essential hypertension    -  Primary ICD-10-CM: I10 
ICD-9-CM: 401.9  Controlled type 2 diabetes mellitus with stage 2 chronic kidney disease, without long-term current use of insulin (HCC)     ICD-10-CM: E11.22, N18.2 ICD-9-CM: 250.40, 585.2 Mixed hyperlipidemia     ICD-10-CM: E78.2 ICD-9-CM: 272.2 PAF (paroxysmal atrial fibrillation) (HCC)     ICD-10-CM: I48.0 ICD-9-CM: 427.31 Primary osteoarthritis involving multiple joints     ICD-10-CM: M15.0 ICD-9-CM: 715.09 Stage 2 chronic kidney disease     ICD-10-CM: N18.2 ICD-9-CM: 185. 2 Acquired hypothyroidism     ICD-10-CM: E03.9 ICD-9-CM: 244.9 Crohn's disease of both small and large intestine without complication ([facility]     MOL-68-CQ: K50.80 ICD-9-CM: 555.2 Encounter for immunization     ICD-10-CM: M41 ICD-9-CM: V03.89 Medicare annual wellness visit, subsequent     ICD-10-CM: Z00.00 ICD-9-CM: V70.0 Vitals BP Pulse Temp Resp Height(growth percentile) Weight(growth percentile) 128/68 (BP 1 Location: Left arm, BP Patient Position: Sitting) 71 97.5 °F (36.4 °C) (Oral) 18 5' 7\" (1.702 m) 185 lb 12.8 oz (84.3 kg) SpO2 BMI OB Status Smoking Status 99% 29.1 kg/m2 Postmenopausal Former Smoker BMI and BSA Data Body Mass Index Body Surface Area  
 29.1 kg/m 2 2 m 2 Preferred Pharmacy Pharmacy Name Phone [facility] [email or web address] [phone] Your Updated Medication List  
  
   
This list is accurate as of 9/20/18 11:54 AM.  Always use your most recent med list. amLODIPine 2.5 mg tablet Commonly known as:  Janna Fraction TAKE 1 TABLET EVERY DAY  
  
 calcium-cholecalciferol (D3) tablet Commonly known as:  CALTRATE 600+D Take 1 Tab by mouth daily. COMPLETE MULTIVITAMIN PO Take 1 Tab by mouth daily. Indications: Complete multivitamin Women's 50+  
  
 diclofenac EC 75 mg EC tablet Commonly known as:  VOLTAREN  
TAKE 1 TABLET TWICE A DAY WITH FOOD FOR PAIN AND INFLAMMATION  
  
 hydroCHLOROthiazide 25 mg tablet Commonly known as:  HYDRODIURIL  
 TAKE 1 TABLET EVERY DAY  
  
 levothyroxine 50 mcg tablet Commonly known as:  SYNTHROID  
TAKE 1 TABLET EVERY DAY  
  
 MUCINEX D PO Take  by mouth as needed. nateglinide 120 mg tablet Commonly known as:  Sherren Aloe TAKE 1 TABLET THREE TIMES DAILY BEFORE MEALS We Performed the Following ADMIN INFLUENZA VIRUS VAC [ HCPCS] AMB POC COMPLETE CBC,AUTOMATED ENTER [id number] CPT(R)] AMB POC URINALYSIS DIP STICK AUTO W/ MICRO  [59034 CPT(R)] AMB POC URINE, MICROALBUMIN, SEMIQUANT (1 RESULT) [86476 CPT(R)] CK [id number] CPT(R)] HEMOGLOBIN A1C WITH EAG [44357 CPT(R)] INFLUENZA VACCINE INACTIVATED (IIV), SUBUNIT, ADJUVANTED, IM [id number] CPT(R)] LIPID PANEL [75248 CPT(R)] METABOLIC PANEL, COMPREHENSIVE [68314 CPT(R)] T4, FREE [id number] CPT(R)] TSH 3RD GENERATION [02775 CPT(R)] Follow-up Instructions Return in about 3 months (around 12/20/2018). Patient Instructions Medicare Wellness Visit, Female The best way to live healthy is to have a lifestyle where you eat a well-balanced diet, exercise regularly, limit alcohol use, and quit all forms of tobacco/nicotine, if applicable. Regular preventive services are another way to keep healthy. Preventive services (vaccines, screening tests, monitoring & exams) can help personalize your care plan, which helps you manage your own care. Screening tests can find health problems at the earliest stages, when they are easiest to treat. [facility] follows the current, evidence-based guidelines published by the [facility] [employer] (USPSTF) when recommending preventive services for our patients. Because we follow these guidelines, sometimes recommendations change over time as research supports it. (For example, mammograms used to be recommended annually.  Even though Medicare will still pay for an annual mammogram, the newer guidelines recommend a mammogram every two years for women of average risk.) Of course, you and your doctor may decide to screen more often for some diseases, based on your risk and your health status. Preventive services for you include: - Medicare offers their members a free annual wellness visit, which is time for you and your primary care provider to discuss and plan for your preventive service needs. Take advantage of this benefit every year! 
-All adults over the age of 72 should receive the recommended pneumonia vaccines. Current USPSTF guidelines recommend a series of two vaccines for the best pneumonia protection.  
-All adults should have a flu vaccine yearly and a tetanus vaccine every 10 years. All adults age 61 and older should receive a shingles vaccine once in their lifetime.   
-A bone mass density test is recommended when a woman turns 65 to screen for osteoporosis. This test is only recommended one time, as a screening. Some providers will use this same test as a disease monitoring tool if you already have osteoporosis. -All adults age 38-68 who are overweight should have a diabetes screening test once every three years.  
-Other screening tests and preventive services for persons with diabetes include: an eye exam to screen for diabetic retinopathy, a kidney function test, a foot exam, and stricter control over your cholesterol.  
-Cardiovascular screening for adults with routine risk involves an electrocardiogram (ECG) at intervals determined by your doctor.  
-Colorectal cancer screenings should be done for adults age 54-65 with no increased risk factors for colorectal cancer. There are a number of acceptable methods of screening for this type of cancer. Each test has its own benefits and drawbacks. Discuss with your doctor what is most appropriate for you during your annual wellness visit.  The different tests include: colonoscopy (considered the best screening method), a fecal occult blood test, a fecal DNA test, and sigmoidoscopy. -Breast cancer screenings are recommended every other year for women of normal risk, age 54-69. 
-Cervical cancer screenings for women over age 72 are only recommended with certain risk factors.  
-All adults born between [facility] should be screened once for Hepatitis C. Here is a list of your current Health Maintenance items (your personalized list of preventive services) with a due date: 
Health Maintenance Due Topic Date Due  
 DTaP/Tdap/Td  (1 - Tdap) 03/07/1967  Shingles Vacc[facility]  [facility]  [facility]2018  Flu Vaccine  08/01/2018  Albumin Urine Test  08/28/2018 [facility] Annual Well Visit  08/29/2018 Atrial Fibrillation: Care Instructions Your Care Instructions Atrial fibrillation is an irregular and often fast heartbeat. Treating this condition is important for several reasons. It can cause blood clots, which can travel from your heart to your brain and cause a stroke. If you have a fast heartbeat, you may feel lightheaded, dizzy, and weak. An irregular heartbeat can also increase your risk for heart failure. Atrial fibrillation is often the result of another heart condition, such as high blood pressure or coronary artery disease. Making changes to improve your heart condition will help you stay healthy and active. Follow-up care is a key part of your treatment and safety. Be sure to make and go to all appointments, and call your doctor if you are having problems. It's also a good idea to know your test results and keep a list of the medicines you take. How can you care for yourself at home? Medicines 
  · Take your medicines exactly as prescribed. Call your doctor if you think you are having a problem with your medicine.  You will get more details on the specific medicines your doctor prescribes.  
  · If your doctor has given you a blood thinner to prevent a stroke, be sure you get instructions about how to take your medicine safely. Blood thinners can cause serious bleeding problems.  
  · Do not take any vitamins, over-the-counter drugs, or herbal products without talking to your doctor first.  
 Lifestyle changes 
  · Do not smoke. Smoking can increase your chance of a stroke and heart attack. If you need help quitting, talk to your doctor about stop-smoking programs and medicines. These can increase your chances of quitting for good.  
  · Eat a heart-healthy diet.  
  · Stay at a healthy weight. Lose weight if you need to.  
  · Limit alcohol to 2 drinks a day for men and 1 drink a day for women. Too much alcohol can cause health problems.  
  · Avoid colds and flu. Get a pneumococcal vaccine shot. If you have had one before, ask your doctor whether you need another dose. Get a flu shot every year. If you must be around people with colds or flu, wash your hands often. Activity 
  · If your doctor recommends it, get more exercise. Walking is a good choice. Bit by bit, increase the amount you walk every day. Try for at least 30 minutes on most days of the week. You also may want to swim, bike, or do other activities. Your doctor may suggest that you join a cardiac rehabilitation program so that you can have help increasing your physical activity safely.  
  · Start light exercise if your doctor says it is okay. Even a small amount will help you get stronger, have more energy, and manage stress. Walking is an easy way to get exercise. Start out by walking a little more than you did in the hospital. Gradually increase the amount you walk.  
  · When you exercise, watch for signs that your heart is working too hard. You are pushing too hard if you cannot talk while you are exercising. If you become short of breath or dizzy or have chest pain, sit down and rest immediately.  
  · Check your pulse regularly.  Place two fingers on the artery at the palm side of your wrist, in line with your thumb. If your heartbeat seems uneven or fast, talk to your doctor. When should you call for help? Call 911 anytime you think you may need emergency care. For example, call if: 
  · You have symptoms of a heart attack. These may include: ¨ Chest pain or pressure, or a strange feeling in the chest. 
¨ Sweating. ¨ Shortness of breath. ¨ Nausea or vomiting. ¨ Pain, pressure, or a strange feeling in the back, neck, jaw, or upper belly or in one or both shoulders or arms. ¨ Lightheadedness or sudden weakness. ¨ A fast or irregular heartbeat. After you call 911, the  may tell you to chew 1 adult-strength or 2 to 4 low-dose aspirin. Wait for an ambulance. Do not try to drive yourself.  
  · You have symptoms of a stroke. These may include: 
¨ Sudden numbness, tingling, weakness, or loss of movement in your face, arm, or leg, especially on only one side of your body. ¨ Sudden vision changes. ¨ Sudden trouble speaking. ¨ Sudden confusion or trouble understanding simple statements. ¨ Sudden problems with walking or balance. ¨ A sudden, severe headache that is different from past headaches.  
  · You passed out (lost consciousness).  
 Call your doctor now or seek immediate medical care if: 
  · You have new or increased shortness of breath.  
  · You feel dizzy or lightheaded, or you feel like you may faint.  
  · Your heart rate becomes irregular.  
  · You can feel your heart flutter in your chest or skip heartbeats. Tell your doctor if these symptoms are new or worse.  
 Watch closely for changes in your health, and be sure to contact your doctor if you have any problems. Where can you learn more? Go to http://michel-brittny.info/. Enter U020 in the search box to learn more about \"Atrial Fibrillation: Care Instructions. \" Current as of: December 6, 2017 Content Version: 11.7 © 7141-5422 Healthwise, Incorporated. Care instructions adapted under license by [employer] (which disclaims liability or warranty for this information). If you have questions about a medical condition or this instruction, always ask your healthcare professional. [address] any warranty or liability for your use of this information. Introducing [facility] [place] [facility]! [facility] introduces [email or web address] patient portal. Now you can access parts of your medical record, email your doctor's office, and request medication refills online. 1. In your internet browser, go to [email or web address]. [email or web address]£ Em [email or web address]© 2. Click on the First Time User? Click Here link in the Sign In box. You will see the New Member Sign Up page. 3. Enter your [email or web address] Access Code exactly as it appears below. You will not need to use this code after youve completed the sign-up process. If you do not sign up before the expiration date, you must request a new code. · [email or web address] Access Code: [email or web address] Expires: 9/25/2018  2:51 PM 
 
4. Enter the last four digits of your Social Security Number (xxxx) and Date of Birth (mm/dd/yyyy) as indicated and click Submit. You will be taken to the next sign-up page. 5. Create a [email or web address] ID. This will be your [email or web address] login ID and cannot be changed, so think of one that is secure and easy to remember. 6. Create a [email or web address] password. You can change your password at any time. 7. Enter your Password Reset Question and Answer. This can be used at a later time if you forget your password. 8. Enter your e-mail address. You will receive e-mail notification when new information is available in [address] 9. Click Sign Up. You can now view and download portions of your medical record. 10. Click the Download Summary menu link to download a portable copy of your medical information.  
 
If you have questions, please visit the Frequently Asked Questions section of the [employer]. Remember, [employer] is NOT to be used for urgent needs. For medical emergencies, dial 911. Now available from your iPhone and Android! Please provide this summary of care documentation to your next provider. Your primary care clinician is listed as Jane. If you have any questions after today's visit, please call [phone].

## 2018-09-21 LAB
ALBUMIN SERPL-MCNC: 4.4 G/DL (ref 3.5–4.8)
ALBUMIN/GLOB SERPL: 1.5 {RATIO} (ref 1.2–2.2)
ALP SERPL-CCNC: 85 IU/L (ref 39–117)
ALT SERPL-CCNC: 27 IU/L (ref 0–32)
AST SERPL-CCNC: 28 IU/L (ref 0–40)
BILIRUB SERPL-MCNC: 0.5 MG/DL (ref 0–1.2)
BUN SERPL-MCNC: 20 MG/DL (ref 8–27)
BUN/CREAT SERPL: 27 (ref 12–28)
CALCIUM SERPL-MCNC: 9.7 MG/DL (ref 8.7–10.3)
CHLORIDE SERPL-SCNC: 100 MMOL/L (ref 96–106)
CHOLEST SERPL-MCNC: 214 MG/DL (ref 100–199)
CK SERPL-CCNC: 217 U/L (ref 24–173)
CO2 SERPL-SCNC: 27 MMOL/L (ref 20–29)
CREAT SERPL-MCNC: 0.73 MG/DL (ref 0.57–1)
EST. AVERAGE GLUCOSE BLD GHB EST-MCNC: 137 MG/DL
GLOBULIN SER CALC-MCNC: 2.9 G/DL (ref 1.5–4.5)
GLUCOSE SERPL-MCNC: 121 MG/DL (ref 65–99)
HBA1C MFR BLD: 6.4 % (ref 4.8–5.6)
HDLC SERPL-MCNC: 64 MG/DL
LDLC SERPL CALC-MCNC: 108 MG/DL (ref 0–99)
POTASSIUM SERPL-SCNC: 4.8 MMOL/L (ref 3.5–5.2)
PROT SERPL-MCNC: 7.3 G/DL (ref 6–8.5)
SODIUM SERPL-SCNC: 142 MMOL/L (ref 134–144)
T4 FREE SERPL-MCNC: 1.44 NG/DL (ref 0.82–1.77)
TRIGL SERPL-MCNC: 209 MG/DL (ref 0–149)
TSH SERPL DL<=0.005 MIU/L-ACNC: 5.17 UIU/ML (ref 0.45–4.5)
VLDLC SERPL CALC-MCNC: 42 MG/DL (ref 5–40)

## 2018-09-25 DIAGNOSIS — I10 ESSENTIAL HYPERTENSION: ICD-10-CM

## 2018-09-25 RX ORDER — AMLODIPINE BESYLATE 2.5 MG/1
TABLET ORAL
Qty: 30 TAB | Refills: 11 | Status: SHIPPED | OUTPATIENT
Start: 2018-09-25 | End: 2019-09-23 | Stop reason: SDUPTHER

## 2018-09-25 NOTE — TELEPHONE ENCOUNTER
RX refill request from the patient/pharmacy. Patient last seen 09- with labs, and next appt. scheduled for 01-.

## 2018-09-26 LAB
BACTERIA UA POCT, BACTPOCT: NORMAL
BILIRUB UR QL STRIP: NEGATIVE
CASTS UA POCT: 0
CLUE CELLS, CLUEPOCT: NEGATIVE
CRYSTALS UA POCT, CRYSPOCT: NEGATIVE
EPITHELIAL CELLS POCT: NEGATIVE
GLUCOSE UR-MCNC: NEGATIVE MG/DL
GRAN# POC: 5.2 K/UL (ref 2–7.8)
GRAN% POC: 60.1 % (ref 37–92)
HCT VFR BLD CALC: 38.1 % (ref 37–51)
HGB BLD-MCNC: 13.3 G/DL (ref 12–18)
KETONES P FAST UR STRIP-MCNC: NEGATIVE MG/DL
LY# POC: 2.8 K/UL (ref 0.6–4.1)
LY% POC: 34.2 % (ref 10–58.5)
MCH RBC QN: 31 PG (ref 26–32)
MCHC RBC-ENTMCNC: 34.9 G/DL (ref 30–36)
MCV RBC: 89 FL (ref 80–97)
MICROALBUMIN UR TEST STR-MCNC: NEGATIVE MG/L (ref 0–20)
MID #, POC: 0.4 K/UL (ref 0–1.8)
MID% POC: 5.7 % (ref 0.1–24)
MUCUS UA POCT, MUCPOCT: NORMAL
PH UR STRIP: 5 [PH] (ref 5–7)
PLATELET # BLD: 329 K/UL (ref 140–440)
PROT UR QL STRIP: NEGATIVE
RBC # BLD: 4.29 M/UL (ref 4.2–6.3)
RBC UA POCT, RBCPOCT: 0
SP GR UR STRIP: 1.02 (ref 1.01–1.02)
TRICH UA POCT, TRICHPOC: NEGATIVE
UA UROBILINOGEN AMB POC: NORMAL (ref 0.2–1)
URINALYSIS CLARITY POC: CLEAR
URINALYSIS COLOR POC: NORMAL
URINE BLOOD POC: NEGATIVE
URINE CULT COMMENT, POCT: NORMAL
URINE LEUKOCYTES POC: NEGATIVE
URINE NITRITES POC: NEGATIVE
WBC # BLD: 8.4 K/UL (ref 4.1–10.9)
WBC UA POCT, WBCPOCT: 0
YEAST UA POCT, YEASTPOC: NEGATIVE

## 2018-10-30 RX ORDER — HYDROCHLOROTHIAZIDE 25 MG/1
TABLET ORAL
Qty: 30 TAB | Refills: 11 | Status: SHIPPED | OUTPATIENT
Start: 2018-10-30 | End: 2019-10-29 | Stop reason: SDUPTHER

## 2018-10-30 NOTE — TELEPHONE ENCOUNTER
RX refill request from the patient/pharmacy. Patient last seen 09- with labs, and next appt. scheduled for 01-  Requested Prescriptions     Pending Prescriptions Disp Refills    hydroCHLOROthiazide (HYDRODIURIL) 25 mg tablet [Pharmacy Med Name: *HYDROCHLOROTHIAZIDE  25MG] 30 Tab 11     Sig: TAKE 1 TABLET EVERY DAY   .

## 2018-11-13 ENCOUNTER — OFFICE VISIT (OUTPATIENT)
Dept: INTERNAL MEDICINE CLINIC | Age: 73
End: 2018-11-13

## 2018-11-13 VITALS
OXYGEN SATURATION: 94 % | HEART RATE: 91 BPM | RESPIRATION RATE: 16 BRPM | TEMPERATURE: 98.7 F | WEIGHT: 187.8 LBS | SYSTOLIC BLOOD PRESSURE: 160 MMHG | BODY MASS INDEX: 29.47 KG/M2 | DIASTOLIC BLOOD PRESSURE: 82 MMHG | HEIGHT: 67 IN

## 2018-11-13 DIAGNOSIS — R05.9 COUGH: ICD-10-CM

## 2018-11-13 DIAGNOSIS — R07.1 CHEST PAIN ON BREATHING: Primary | ICD-10-CM

## 2018-11-13 DIAGNOSIS — I10 ESSENTIAL HYPERTENSION: ICD-10-CM

## 2018-11-13 RX ORDER — PREDNISONE 10 MG/1
10 TABLET ORAL SEE ADMIN INSTRUCTIONS
Qty: 21 TAB | Refills: 0 | Status: SHIPPED | OUTPATIENT
Start: 2018-11-13 | End: 2019-01-02 | Stop reason: ALTCHOICE

## 2018-11-13 NOTE — PROGRESS NOTES
Subjective:  
Roldan Liriano is a 68 y.o. female Chief Complaint Patient presents with  Cough  Cold Symptoms History of present illness: She presents complaining of some chest pain on breathing and a cough with some congestion but does not feel like she has any real chest congestion and certainly does not have any head congestion or sore throat. She is noted no fevers or chills and has had no night sweats. This feels somewhat like pleurisy she had before the pain predominantly in the right side but it is not resolved with some diclofenac but she is been taken. She notes no shortness of breath or other complaints. Patient Active Problem List  
Diagnosis Code  
 SBO (small bowel obstruction) ([facility] K44.544  [facility] K50.90  Essential hypertension I10  
 Controlled type 2 diabetes mellitus with stage 2 chronic kidney disease, without long-term current use of insulin ([facility]) E11.22, N18.2  Mixed hyperlipidemia E78.2  Primary osteoarthritis involving multiple joints M15.0  
 PAF (paroxysmal atrial fibrillation) ([facility]) I48.0  Post-menopausal Z78.0  
 On statin therapy Z79.899  Acquired hypothyroidism E03.9  Stage 2 chronic kidney disease N18.2  Medicare annual wellness visit, subsequent Z00.00  Colon cancer screening Z12.11  
 Acute bronchitis J20.9  Acute non-recurrent maxillary sinusitis J01.00  Chest pain on breathing R07.1  Cough R05 Past Medical History:  
Diagnosis Date  Acquired hypothyroidism 7/26/2017  CKD (chronic kidney disease) 7/26/2017  Crohn's disease ([facility]  Diabetes ([facility]  Hypertension  On statin therapy 7/26/2017  Post-menopausal 7/26/2017 Allergies Allergen Reactions  Lisinopril Cough Family History Problem Relation Age of Onset  Diabetes Mother  Heart Failure Mother  Cancer Father   
     stomach Social History Socioeconomic History  Marital status:  Spouse name: Not on file  Number of children: Not on file  Years of education: Not on file  Highest education level: Not on file Social Needs  Financial resource strain: Not on file  Food insecurity - worry: Not on file  Food insecurity - inability: Not on file  Transportation needs - medical: Not on file  Transportation needs - non-medical: Not on file Occupational History  Not on file Tobacco Use  Smoking status: Former Smoker Packs/day: 1.00 Last attempt to quit: 1979 Years since quittin.8  Smokeless tobacco: Never Used Substance and Sexual Activity  Alcohol use: Yes Comment: 2 drinks per month  Drug use: No  
 Sexual activity: No  
Other Topics Concern  Not on file Social History Narrative  Not on file Prior to Admission medications Medication Sig Start Date End Date Taking? Authorizing Provider  
predniSONE (STERAPRED DS) 10 mg dose pack Take 1 Tab by mouth See Admin Instructions. See administration instruction per 10mg dose pack 18  Yes Mel Bañuelos MD  
hydroCHLOROthiazide (HYDRODIURIL) 25 mg tablet TAKE 1 TABLET EVERY DAY 10/30/18  Yes Mel Bañuelos MD  
amLODIPine (NORVASC) 2.5 mg tablet TAKE 1 TABLET EVERY DAY 18  Yes Mel Bañuelos MD  
nateglinide (STARLIX) 120 mg tablet TAKE 1 TABLET THREE TIMES DAILY BEFORE MEALS 18  Yes Mel Bañuelos MD  
diclofenac EC (VOLTAREN) 75 mg EC tablet TAKE 1 TABLET TWICE A DAY WITH FOOD FOR PAIN AND INFLAMMATION 3/20/18  Yes Mel Bañuelos MD  
levothyroxine (SYNTHROID) 50 mcg tablet TAKE 1 TABLET EVERY DAY 3/5/18  Yes Mel Bañuelos MD  
GUAIFENESIN/PSEUDOEPHEDRNE HCL [facility] D PO) Take  by mouth as needed. Yes Provider, Historical  
MULTIVIT,TH IRON,OTHER MIN (COMPLETE MULTIVITAMIN PO) Take 1 Tab by mouth daily.  Indications: Complete multivitamin Women's 50+   Yes Other, MD Cordelia  
 calcium-cholecalciferol, D3, (CALTRATE 600+D) tablet Take 1 Tab by mouth daily. Yes Other, MD Cordelia  
  
 
Review of Systems Constitutional:  She denies fever, weight loss, sweats or fatigue. EYES: No blurred or double vision, ENT: no nasal congestion, no headache or dizziness. No difficulty with               swallowing, taste, speech or smell. Respiratory: Positive for cough and chest pain on breathing without wheezing or shortness of breath. No sputum production. Cardiac:  Denies chest pain, palpitations, unexplained indigestion, syncope, edema, PND or orthopnea. GI:  No changes in bowel movements, no abdominal pain, no bloating, anorexia, nausea, vomiting or heartburn. :  No frequency or dysuria. Denies incontinence or sexual dysfunction. Extremities:  No joint pain, stiffness or swelling Back:.no pain or soreness Skin:  No recent rashes or mole changes. Neurological:  No numbness, tingling, burning paresthesias or loss of motor strength. No syncope, dizziness, frequent headaches or memory loss. Hematologic:  No easy bruising Lymphatic: No lymph node enlargement Objective:  
 
Vitals:  
 11/13/18 1527 11/13/18 1602 BP: 170/80 160/82 Pulse: 91   
Resp: 16 Temp: 98.7 °F (37.1 °C) SpO2: 94% Weight: 187 lb 12.8 oz (85.2 kg) Height: 5' 7\" (1.702 m) PainSc:   0 - No pain Body mass index is 29.41 kg/m². Physical Examination:  
           General Appearance:  Well-developed, well-nourished, no acute distress. HEENT:   
  Ears:  The TMs and ear canals were clear. Eyes:  The pupillary responses were normal.  Extraocular muscle function intact. Lids and conjunctiva not injected. Funduscopic exam revealed sharp disc margins. Nares: Clear w/o edema or erythema Pharynx:  Clear with teeth in good repair. No masses were noted. Neck:  Supple without thyromegaly or adenopathy. No JVD noted.   No carotid                bruits. Lungs:  Clear to auscultation and percussion except fine bibasilar rales. Cardiac:  Regular rate and rhythm without murmur. PMI not displaced. No gallop, rub or click. Abdominal: Soft, non-tender, no hepata-spleenomegally or masses Extremities:  No clubbing, cyanosis or edema. Skin:  No rash or unusual mole changes noted. Lymph Nodes:  None felt in the cervical, supraclavicular, axillary or inguinal region. Neurological: . DTRs 2+ and symmetric. Station and gait normal.  
Hematologic:   No purpura or petechiae Assessment/Plan: 1. Chest pain on breathing 2. Cough 3. Essential hypertension Impressions/Plan: 
Impression 1. Chest pain on breathing chest x-ray obtained today is normal I think with the pleurisy we will treat this with the prednisone 10 mg six-day Dosepak. 2.  Hypertension that is not controlled but we will recheck that closely and follow-up within the next month or so. If his up at that time will make some adjustments. She would notify me if symptoms not resolved with a steroid Dosepak. Orders Placed This Encounter  XR CHEST PA LAT  predniSONE (STERAPRED DS) 10 mg dose pack Follow-up Disposition: 
Return TBD. Results for orders placed or performed in visit on 11/13/18 XR CHEST PA LAT Narrative EXAM:  XR CHEST PA LAT INDICATION:   Chest pain COMPARISON: None. FINDINGS: PA and lateral radiographs of the chest demonstrate clear lungs and 
pleural margins. Cardiac size is within normal limits. There is mild thoracic 
aortic tortuosity with otherwise normal mediastinal and hilar contour. Minimal 
thoracic spine degenerative changes are shown. Impression IMPRESSION: No acute findings. Britt Sol MD 
 
The patient was given after the visit summary the patient verbalized an understanding of the plans and problems as explained.

## 2018-11-13 NOTE — PATIENT INSTRUCTIONS
Bronchitis: Care Instructions Your Care Instructions Bronchitis is inflammation of the bronchial tubes, which carry air to the lungs. The tubes swell and produce mucus, or phlegm. The mucus and inflamed bronchial tubes make you cough. You may have trouble breathing. Most cases of bronchitis are caused by viruses like those that cause colds. Antibiotics usually do not help and they may be harmful. Bronchitis usually develops rapidly and lasts about 2 to 3 weeks in otherwise healthy people. Follow-up care is a key part of your treatment and safety. Be sure to make and go to all appointments, and call your doctor if you are having problems. It's also a good idea to know your test results and keep a list of the medicines you take. How can you care for yourself at home? · Take all medicines exactly as prescribed. Call your doctor if you think you are having a problem with your medicine. · Get some extra rest. 
· Take an over-the-counter pain medicine, such as acetaminophen (Tylenol), ibuprofen (Advil, Motrin), or naproxen (Aleve) to reduce fever and relieve body aches. Read and follow all instructions on the label. · Do not take two or more pain medicines at the same time unless the doctor told you to. Many pain medicines have acetaminophen, which is Tylenol. Too much acetaminophen (Tylenol) can be harmful. · Take an over-the-counter cough medicine that contains dextromethorphan to help quiet a dry, hacking cough so that you can sleep. Avoid cough medicines that have more than one active ingredient. Read and follow all instructions on the label. · Breathe moist air from a humidifier, hot shower, or sink filled with hot water. The heat and moisture will thin mucus so you can cough it out. · Do not smoke. Smoking can make bronchitis worse. If you need help quitting, talk to your doctor about stop-smoking programs and medicines. These can increase your chances of quitting for good. When should you call for help? Call 911 anytime you think you may need emergency care. For example, call if: 
  · You have severe trouble breathing.  
 Call your doctor now or seek immediate medical care if: 
  · You have new or worse trouble breathing.  
  · You cough up dark brown or bloody mucus (sputum).  
  · You have a new or higher fever.  
  · You have a new rash.  
 Watch closely for changes in your health, and be sure to contact your doctor if: 
  · You cough more deeply or more often, especially if you notice more mucus or a change in the color of your mucus.  
  · You are not getting better as expected. Where can you learn more? Go to http://michel-brittny.info/. Enter H333 in the search box to learn more about \"Bronchitis: Care Instructions. \" Current as of: December 6, 2017 Content Version: 11.8 © 0657-6415 [employer], [employer]. Care instructions adapted under license by [employer] (which disclaims liability or warranty for this information). If you have questions about a medical condition or this instruction, always ask your healthcare professional. [address] any warranty or liability for your use of this information.

## 2019-01-02 ENCOUNTER — OFFICE VISIT (OUTPATIENT)
Dept: INTERNAL MEDICINE CLINIC | Age: 74
End: 2019-01-02

## 2019-01-02 VITALS
HEART RATE: 89 BPM | HEIGHT: 67 IN | WEIGHT: 187.4 LBS | OXYGEN SATURATION: 96 % | BODY MASS INDEX: 29.41 KG/M2 | RESPIRATION RATE: 20 BRPM | DIASTOLIC BLOOD PRESSURE: 78 MMHG | SYSTOLIC BLOOD PRESSURE: 138 MMHG

## 2019-01-02 DIAGNOSIS — N18.2 CONTROLLED TYPE 2 DIABETES MELLITUS WITH STAGE 2 CHRONIC KIDNEY DISEASE, WITHOUT LONG-TERM CURRENT USE OF INSULIN (HCC): ICD-10-CM

## 2019-01-02 DIAGNOSIS — N18.2 STAGE 2 CHRONIC KIDNEY DISEASE: ICD-10-CM

## 2019-01-02 DIAGNOSIS — I48.0 PAF (PAROXYSMAL ATRIAL FIBRILLATION) (HCC): ICD-10-CM

## 2019-01-02 DIAGNOSIS — E78.2 MIXED HYPERLIPIDEMIA: ICD-10-CM

## 2019-01-02 DIAGNOSIS — M15.9 PRIMARY OSTEOARTHRITIS INVOLVING MULTIPLE JOINTS: ICD-10-CM

## 2019-01-02 DIAGNOSIS — I10 ESSENTIAL HYPERTENSION: Primary | ICD-10-CM

## 2019-01-02 DIAGNOSIS — E11.22 CONTROLLED TYPE 2 DIABETES MELLITUS WITH STAGE 2 CHRONIC KIDNEY DISEASE, WITHOUT LONG-TERM CURRENT USE OF INSULIN (HCC): ICD-10-CM

## 2019-01-02 NOTE — PROGRESS NOTES
Chief Complaint Patient presents with  Hypertension 3 month follow up SUBJECTIVE: 
 
Jojo Balderas is a 68 y.o. female who returns in follow-up for medical problems include hypertension, diabetes, hyperlipidemia, paroxysmal atrial fibrillation, DJD, CKD stage II and other medical problems. She is taking her medications and trying to follow her diet and get some exercise. She currently denies any chest pain, shortness of breath, palpitations, PND, orthopnea or cardiorespiratory complaints. She denies any GI or  complaints. She denies any headaches, dizziness or neurological planes. There are no current arthritic complaints and no other complaints on complete review of systems. Current Outpatient Medications Medication Sig Dispense Refill  hydroCHLOROthiazide (HYDRODIURIL) 25 mg tablet TAKE 1 TABLET EVERY DAY 30 Tab 11  
 amLODIPine (NORVASC) 2.5 mg tablet TAKE 1 TABLET EVERY DAY 30 Tab 11  
 nateglinide (STARLIX) 120 mg tablet TAKE 1 TABLET THREE TIMES DAILY BEFORE MEALS 90 Tab 12  
 diclofenac EC (VOLTAREN) 75 mg EC tablet TAKE 1 TABLET TWICE A DAY WITH FOOD FOR PAIN AND INFLAMMATION 60 Tab 11  
 levothyroxine (SYNTHROID) 50 mcg tablet TAKE 1 TABLET EVERY DAY 30 Tab 11  
 MULTIVIT,TH IRON,OTHER MIN (COMPLETE MULTIVITAMIN PO) Take 1 Tab by mouth daily. Indications: Complete multivitamin Women's 50+  calcium-cholecalciferol, D3, (CALTRATE 600+D) tablet Take 1 Tab by mouth daily.  GUAIFENESIN/PSEUDOEPHEDRNE HCL (MUCINEX D PO) Take  by mouth as needed. Past Medical History:  
Diagnosis Date  Acquired hypothyroidism 7/26/2017  CKD (chronic kidney disease) 7/26/2017  [facility] ([facility] [address]  Diabetes ([facility] [address]  Hypertension  On statin therapy 7/26/2017  Post-menopausal 7/26/2017 Past Surgical History:  
Procedure Laterality Date  BREAST SURGERY PROCEDURE UNLISTED    
 cyst removed right  COLONOSCOPY N/A 5/1/2018 COLONOSCOPY performed by Nehemiah Munoz MD at [facility] ENDOSCOPY  COLONOSCOPY,DIAGNOSTIC  2018  HX BREAST BIOPSY Right   
 over 30 years  negative  HX GI    
 tumor from colon removed  HX ORTHOPAEDIC    
 right wrist  
 HX OTHER SURGICAL Bowel Obstruction  IN COLONOSCOPY W/BIOPSY SINGLE/MULTIPLE  2014 Allergies Allergen Reactions  Lisinopril Cough REVIEW OF SYSTEMS: 
General: negative for - chills or fever, or weight loss or gain ENT: negative for - headaches, nasal congestion or tinnitus Eyes: no blurred or visual changes Neck: No stiffness or swollen nodes Respiratory: negative for - cough, hemoptysis, shortness of breath or wheezing Cardiovascular : negative for - chest pain, edema, palpitations or shortness of breath Gastrointestinal: negative for - abdominal pain, blood in stools, heartburn or nausea/vomiting Genito-Urinary: no dysuria, trouble voiding, or hematuria Musculoskeletal: negative for - gait disturbance, joint pain, joint stiffness or joint swelling Neurological: no TIA or stroke symptoms Hematologic: no bruises, no bleeding Lymphatic: no swollen glands Integument: no lumps, mole changes, nail changes or rash Endocrine:no malaise/lethargy poly uria or polydipsia or unexpected weight changes Social History Socioeconomic History  Marital status:  Spouse name: Not on file  Number of children: Not on file  Years of education: Not on file  Highest education level: Not on file Tobacco Use  Smoking status: Former Smoker Packs/day: 1.00 Last attempt to quit: 1979 Years since quittin.0  Smokeless tobacco: Never Used Substance and Sexual Activity  Alcohol use: Yes Comment: 2 drinks per month  Drug use: No  
 Sexual activity: No  
 
Family History Problem Relation Age of Onset  Diabetes Mother  Heart Failure Mother  Cancer Father   
     stomach OBJECTIVE:  
 
Visit Vitals /78 Pulse 89 Resp 20 Ht 5' 7\" (1.702 m) Wt 187 lb 6.4 oz (85 kg) SpO2 96% BMI 29.35 kg/m² CONSTITUTIONAL:   well nourished, appears age appropriate EYES: sclera anicteric, PERRL, EOMI 
ENMT:nares clear, moist mucous membranes, pharynx clear NECK: supple. Thyroid normal, No JVD or bruits RESPIRATORY: Chest: clear to ascultation and percussion, normal inspiratory effort CARDIOVASCULAR: Heart: regular rate and rhythm no murmurs, rubs or gallops, PMI not displaced, No thrills GASTROINTESTINAL: Abdomen: non distended, soft, non tender, bowel sounds normal 
HEMATOLOGIC: no purpura, petechiae or bruising LYMPHATIC: No lymph node enlargemant MUSCULOSKELETAL: Extremities: no edema or active synovitis, pulse 1+ INTEGUMENT: No unusual rashes or suspicious skin lesions noted. Nails appear normal. No diabetic foot changes noted. PERIPHERAL VASCULAR: normal pulses femoral, PT and DP NEUROLOGIC: non-focal exam, A & O X 3. Normal distal sensation and proprioception in all toes both feet. PSYCHIATRIC:, appropriate affect ASSESSMENT:  
1. Essential hypertension 2. Controlled type 2 diabetes mellitus with stage 2 chronic kidney disease, without long-term current use of insulin ([address] 3. Mixed hyperlipidemia 4. Primary osteoarthritis involving multiple joints 5. PAF (paroxysmal atrial fibrillation) ([address] 6. Stage 2 chronic kidney disease Impression 1. Hypertension that is controlled to continue current therapy reviewed with her 2. Diabetes repeat status pending and prior labs reviewed no make adjustments if necessary. 3   Hyperlipidemia prior lab reviewed repeat status pending I will adjust if needed. 4. DJD that is stable 5. Paroxysmal atrial fibrillation currently in sinus rhythm 6. CKD stage II repeat status pending I will call the lab results and make further recommendations or adjustments if necessary. Follow-up as scheduled for 3 months or sooner if there is a problem. PLAN: 
. Orders Placed This Encounter  METABOLIC PANEL, COMPREHENSIVE  LIPID PANEL  
 CK  
 HEMOGLOBIN A1C WITH EAG  
 
 
 
ATTENTION:  
This medical record was transcribed using an electronic medical records system. Although proofread, it may and can contain electronic and spelling errors. Other human spelling and other errors may be present. Corrections may be executed at a later time. Please feel free to contact us for any clarifications as needed. Follow-up Disposition: 
Return in about 3 months (around 4/2/2019). No results found for any visits on 01/02/19. Evelina Zelaya MD 
 
The patient verbalized understanding of the problems and plans as explained.

## 2019-01-02 NOTE — PROGRESS NOTES
Chief Complaint Patient presents with  Hypertension 3 month follow up 1. Have you been to the ER, urgent care clinic since your last visit? NO  Hospitalized since your last visit? NO 
 
2. Have you seen or consulted any other health care providers outside of the [address] [place] [address] since your last visit?    NO

## 2019-01-02 NOTE — PATIENT INSTRUCTIONS
Arthritis: Care Instructions Your Care Instructions Arthritis, also called osteoarthritis, is a breakdown of the cartilage that cushions your joints. When the cartilage wears down, your bones rub against each other. This causes pain and stiffness. Many people have some arthritis as they age. Arthritis most often affects the joints of the spine, hands, hips, knees, or feet. You can take simple measures to protect your joints, ease your pain, and help you stay active. Follow-up care is a key part of your treatment and safety. Be sure to make and go to all appointments, and call your doctor if you are having problems. It's also a good idea to know your test results and keep a list of the medicines you take. How can you care for yourself at home? · Stay at a healthy weight. Being overweight puts extra strain on your joints. · Talk to your doctor or physical therapist about exercises that will help ease joint pain. ? Stretch. You may enjoy gentle forms of yoga to help keep your joints and muscles flexible. ? Walk instead of jog. Other types of exercise that are less stressful on the joints include riding a bicycle, swimming, ginna chi, or water exercise. ? Lift weights. Strong muscles help reduce stress on your joints. Stronger thigh muscles, for example, take some of the stress off of the knees and hips. Learn the right way to lift weights so you do not make joint pain worse. · Take your medicines exactly as prescribed. Call your doctor if you think you are having a problem with your medicine. · Take pain medicines exactly as directed. ? If the doctor gave you a prescription medicine for pain, take it as prescribed. ? If you are not taking a prescription pain medicine, ask your doctor if you can take an over-the-counter medicine. · Use a cane, crutch, walker, or another device if you need help to get around. These can help rest your joints.  You also can use other things to make life easier, such as a higher toilet seat and padded handles on kitchen utensils. · Do not sit in low chairs, which can make it hard to get up. · Put heat or cold on your sore joints as needed. Use whichever helps you most. You also can take turns with hot and cold packs. ? Apply heat 2 or 3 times a day for 20 to 30 minutesusing a heating pad, hot shower, or hot packto relieve pain and stiffness. ? Put ice or a cold pack on your sore joint for 10 to 20 minutes at a time. Put a thin cloth between the ice and your skin. When should you call for help? Call your doctor now or seek immediate medical care if: 
  · You have sudden swelling, warmth, or pain in any joint.  
  · You have joint pain and a fever or rash.  
  · You have such bad pain that you cannot use a joint.  
 Watch closely for changes in your health, and be sure to contact your doctor if: 
  · You have mild joint symptoms that continue even with more than 6 weeks of care at home.  
  · You have stomach pain or other problems with your medicine. Where can you learn more? Go to http://michel-brittny.info/. Enter H239 in the search box to learn more about \"Arthritis: Care Instructions. \" Current as of: June 11, 2018 Content Version: 11.8 © 1921-9522 [employer]. Care instructions adapted under license by [employer] (which disclaims liability or warranty for this information). If you have questions about a medical condition or this instruction, always ask your healthcare professional. [place] [address] any warranty or liability for your use of this information.

## 2019-01-03 LAB
ALBUMIN SERPL-MCNC: 4.5 G/DL (ref 3.5–4.8)
ALBUMIN/GLOB SERPL: 1.7 {RATIO} (ref 1.2–2.2)
ALP SERPL-CCNC: 95 IU/L (ref 39–117)
ALT SERPL-CCNC: 24 IU/L (ref 0–32)
AST SERPL-CCNC: 26 IU/L (ref 0–40)
BILIRUB SERPL-MCNC: 0.5 MG/DL (ref 0–1.2)
BUN SERPL-MCNC: 18 MG/DL (ref 8–27)
BUN/CREAT SERPL: 25 (ref 12–28)
CALCIUM SERPL-MCNC: 9.7 MG/DL (ref 8.7–10.3)
CHLORIDE SERPL-SCNC: 98 MMOL/L (ref 96–106)
CHOLEST SERPL-MCNC: 210 MG/DL (ref 100–199)
CK SERPL-CCNC: 244 U/L (ref 24–173)
CO2 SERPL-SCNC: 24 MMOL/L (ref 20–29)
CREAT SERPL-MCNC: 0.71 MG/DL (ref 0.57–1)
EST. AVERAGE GLUCOSE BLD GHB EST-MCNC: 143 MG/DL
GLOBULIN SER CALC-MCNC: 2.7 G/DL (ref 1.5–4.5)
GLUCOSE SERPL-MCNC: 126 MG/DL (ref 65–99)
HBA1C MFR BLD: 6.6 % (ref 4.8–5.6)
HDLC SERPL-MCNC: 67 MG/DL
LDLC SERPL CALC-MCNC: 94 MG/DL (ref 0–99)
POTASSIUM SERPL-SCNC: 4.3 MMOL/L (ref 3.5–5.2)
PROT SERPL-MCNC: 7.2 G/DL (ref 6–8.5)
SODIUM SERPL-SCNC: 138 MMOL/L (ref 134–144)
TRIGL SERPL-MCNC: 245 MG/DL (ref 0–149)
VLDLC SERPL CALC-MCNC: 49 MG/DL (ref 5–40)

## 2019-01-05 NOTE — PROGRESS NOTES
Labs are good except for slight increase glycohemoglobin and triglycerides probably related to dietary indiscretion over the holiday so watch diet more closely, at this point I will not change medicines.

## 2019-01-07 ENCOUNTER — TELEPHONE (OUTPATIENT)
Dept: INTERNAL MEDICINE CLINIC | Age: 74
End: 2019-01-07

## 2019-01-07 NOTE — TELEPHONE ENCOUNTER
----- Message from Chiqui Ocasio MD sent at 1/4/2019  7:26 PM EST -----  Labs are good except for slight increase glycohemoglobin and triglycerides probably related to dietary indiscretion over the holiday so watch diet more closely, at this point I will not change medicines.

## 2019-02-28 DIAGNOSIS — E03.9 ACQUIRED HYPOTHYROIDISM: ICD-10-CM

## 2019-02-28 RX ORDER — LEVOTHYROXINE SODIUM 50 UG/1
TABLET ORAL
Qty: 30 TAB | Refills: 11 | Status: SHIPPED | OUTPATIENT
Start: 2019-02-28 | End: 2020-02-24

## 2019-02-28 NOTE — TELEPHONE ENCOUNTER
RX refill request from the patient/pharmacy. Patient last seen 01- with labs, and next appt. scheduled for 04-  Requested Prescriptions     Pending Prescriptions Disp Refills    levothyroxine (SYNTHROID) 50 mcg tablet [Pharmacy Med Name: LEVOTHYROXIN 50MCG] 30 Tab 11     Sig: TAKE 1 TABLET EVERY DAY   .

## 2019-04-08 NOTE — PROGRESS NOTES
Chief Complaint Patient presents with  Hypertension  Diabetes  Chronic Kidney Disease  Cholesterol Problem  Cough  
  x 4 days SUBJECTIVE: 
 
Reji Madrid is a 69 y.o. female who returns in follow-up of her medical problems include hypertension, diabetes, hyperlipidemia, CKD, and other medical problems. She had a cough for the past 4 days but has been dry nonproductive. She does feel like she does have a little head congestion but there is no college any drainage at all. She denies any fevers or chills. She denies any shortness of breath. There are no other cardiorespiratory complaints. She has no GI or  complaints. She has no headaches, dizziness or neurologic complaints. There are no arthritic complaints and no other complaints on complete review of systems. She is taking her medications and trying to follow her diet. Current Outpatient Medications Medication Sig Dispense Refill  levothyroxine (SYNTHROID) 50 mcg tablet TAKE 1 TABLET EVERY DAY 30 Tab 11  
 hydroCHLOROthiazide (HYDRODIURIL) 25 mg tablet TAKE 1 TABLET EVERY DAY 30 Tab 11  
 amLODIPine (NORVASC) 2.5 mg tablet TAKE 1 TABLET EVERY DAY 30 Tab 11  
 nateglinide (STARLIX) 120 mg tablet TAKE 1 TABLET THREE TIMES DAILY BEFORE MEALS 90 Tab 12  
 diclofenac EC (VOLTAREN) 75 mg EC tablet TAKE 1 TABLET TWICE A DAY WITH FOOD FOR PAIN AND INFLAMMATION 60 Tab 11  
 GUAIFENESIN/PSEUDOEPHEDRNE HCL (MUCINEX D PO) Take  by mouth as needed.  MULTIVIT,TH IRON,OTHER MIN (COMPLETE MULTIVITAMIN PO) Take 1 Tab by mouth daily. Indications: Complete multivitamin Women's 50+  calcium-cholecalciferol, D3, (CALTRATE 600+D) tablet Take 1 Tab by mouth daily. Past Medical History:  
Diagnosis Date  Acquired hypothyroidism 7/26/2017  CKD (chronic kidney disease) 7/26/2017  Cro[facility]'s disease ([facility] [address]  Diabetes ([facility] [address]  Hypertension  On statin therapy 7/26/2017  Post-menopausal 7/26/2017 Past Surgical History:  
Procedure Laterality Date  BREAST SURGERY PROCEDURE UNLISTED    
 cyst removed right  COLONOSCOPY N/A 2018 COLONOSCOPY performed by Uyen Chen MD at [facility] ENDOSCOPY  COLONOSCOPY,DIAGNOSTIC  2018  HX BREAST BIOPSY Right   
 over 30 years  negative  HX GI    
 tumor from colon removed  HX ORTHOPAEDIC    
 right wrist  
 HX OTHER SURGICAL Bowel Obstruction  [place] COLONOSCOPY W/BIOPSY SINGLE/MULTIPLE  2014 Allergies Allergen Reactions  Lisinopril Cough REVIEW OF SYSTEMS: 
General: negative for - chills or fever, or weight loss or gain ENT: negative for - headaches, nasal congestion or tinnitus Eyes: no blurred or visual changes Neck: No stiffness or swollen nodes Respiratory: negative for - cough, hemoptysis, shortness of breath or wheezing Cardiovascular : negative for - chest pain, edema, palpitations or shortness of breath Gastrointestinal: negative for - abdominal pain, blood in stools, heartburn or nausea/vomiting Genito-Urinary: no dysuria, trouble voiding, or hematuria Musculoskeletal: negative for - gait disturbance, joint pain, joint stiffness or joint swelling Neurological: no TIA or stroke symptoms Hematologic: no bruises, no bleeding Lymphatic: no swollen glands Integument: no lumps, mole changes, nail changes or rash Endocrine:no malaise/lethargy poly uria or polydipsia or unexpected weight changes Social History Socioeconomic History  Marital status:  Spouse name: Not on file  Number of children: Not on file  Years of education: Not on file  Highest education level: Not on file Tobacco Use  Smoking status: Former Smoker Packs/day: 1.00 Last attempt to quit: 1979 Years since quittin.2  Smokeless tobacco: Never Used Substance and Sexual Activity  Alcohol use: Yes Comment: 2 drinks per month  Drug use:  No  
  Sexual activity: Never Family History Problem Relation Age of Onset  Diabetes Mother  Heart Failure Mother  Cancer Father   
     stomach OBJECTIVE:  
 
Visit Vitals /82 (BP 1 Location: Left arm, BP Patient Position: Sitting) Pulse 87 Temp 98.6 °F (37 °C) (Oral) Resp 17 Ht 5' 7\" (1.702 m) Wt 186 lb (84.4 kg) SpO2 97% BMI 29.13 kg/m² CONSTITUTIONAL:   well nourished, appears age appropriate EYES: sclera anicteric, PERRL, EOMI 
ENMT:nares clear, moist mucous membranes, pharynx clear NECK: supple. Thyroid normal, No JVD or bruits RESPIRATORY: Chest: clear to ascultation and percussion, normal inspiratory effort CARDIOVASCULAR: Heart: regular rate and rhythm no murmurs, rubs or gallops, PMI not displaced, No thrills GASTROINTESTINAL: Abdomen: non distended, soft, non tender, bowel sounds normal 
HEMATOLOGIC: no purpura, petechiae or bruising LYMPHATIC: No lymph node enlargemant MUSCULOSKELETAL: Extremities: no edema or active synovitis, pulse 1+ INTEGUMENT: No unusual rashes or suspicious skin lesions noted. Nails appear normal. 
PERIPHERAL VASCULAR: normal pulses femoral, PT and DP NEUROLOGIC: non-focal exam, A & O X 3 PSYCHIATRIC:, appropriate affect ASSESSMENT:  
1. Essential hypertension 2. Controlled type 2 diabetes mellitus with stage 2 chronic kidney disease, without long-term current use of insulin ([address] 3. Mixed hyperlipidemia 4. PAF (paroxysmal atrial fibrillation) ([address] 5. Primary osteoarthritis involving multiple joints 6. Stage 2 chronic kidney disease 7. Crohn's disease of both small and large intestine without complication ([address] Impression 1. Hypertension is controlled so continue current therapy reviewed with her. 2.  Diabetes repeat status is pending and prior labs reviewed on make adjustments if necessary. 3.  Hyperlipidemia prior lab reviewed and repeat status pending I will adjust if needed. 4   Paroxysmal atrial fibrillation currently in sinus rhythm 5. DJD that is stable 6. CKD stage II repeat status pending 7. Crohn's disease that is stable 8 cough and congestion I think this is all allergy and I will think we need any current antibiotics for this and I reassured her I recheck her again myself in 3 months or sooner if there is a problem. I will call the lab results in interim. PLAN: 
. Orders Placed This Encounter  HEMOGLOBIN A1C WITH EAG  
 METABOLIC PANEL, COMPREHENSIVE ([facility])  LIPID PANEL ([facility])  CK ([facility]) ATTENTION:  
This medical record was transcribed using an electronic medical records system. Although proofread, it may and can contain electronic and spelling errors. Other human spelling and other errors may be present. Corrections may be executed at a later time. Please feel free to contact us for any clarifications as needed. Follow-up and Dispositions · Return in about 3 months (around 7/9/2019). No results found for any visits on 04/09/19. Efren Morris MD 
 
The patient verbalized understanding of the problems and plans as explained.

## 2019-04-09 ENCOUNTER — OFFICE VISIT (OUTPATIENT)
Dept: INTERNAL MEDICINE CLINIC | Age: 74
End: 2019-04-09

## 2019-04-09 VITALS
HEART RATE: 87 BPM | HEIGHT: 67 IN | WEIGHT: 186 LBS | DIASTOLIC BLOOD PRESSURE: 82 MMHG | TEMPERATURE: 98.6 F | OXYGEN SATURATION: 97 % | RESPIRATION RATE: 17 BRPM | BODY MASS INDEX: 29.19 KG/M2 | SYSTOLIC BLOOD PRESSURE: 130 MMHG

## 2019-04-09 DIAGNOSIS — I48.0 PAF (PAROXYSMAL ATRIAL FIBRILLATION) (HCC): ICD-10-CM

## 2019-04-09 DIAGNOSIS — K50.80 CROHN'S DISEASE OF BOTH SMALL AND LARGE INTESTINE WITHOUT COMPLICATION (HCC): ICD-10-CM

## 2019-04-09 DIAGNOSIS — N18.2 CONTROLLED TYPE 2 DIABETES MELLITUS WITH STAGE 2 CHRONIC KIDNEY DISEASE, WITHOUT LONG-TERM CURRENT USE OF INSULIN (HCC): ICD-10-CM

## 2019-04-09 DIAGNOSIS — N18.2 STAGE 2 CHRONIC KIDNEY DISEASE: ICD-10-CM

## 2019-04-09 DIAGNOSIS — I10 ESSENTIAL HYPERTENSION: Primary | ICD-10-CM

## 2019-04-09 DIAGNOSIS — M15.9 PRIMARY OSTEOARTHRITIS INVOLVING MULTIPLE JOINTS: ICD-10-CM

## 2019-04-09 DIAGNOSIS — E11.22 CONTROLLED TYPE 2 DIABETES MELLITUS WITH STAGE 2 CHRONIC KIDNEY DISEASE, WITHOUT LONG-TERM CURRENT USE OF INSULIN (HCC): ICD-10-CM

## 2019-04-09 DIAGNOSIS — E78.2 MIXED HYPERLIPIDEMIA: ICD-10-CM

## 2019-04-09 LAB
A-G RATIO,AGRAT: 1.5 RATIO
ALBUMIN SERPL-MCNC: 4.4 G/DL (ref 3.9–5.4)
ALP SERPL-CCNC: 81 U/L (ref 38–126)
ALT SERPL-CCNC: 25 U/L (ref 9–52)
ANION GAP SERPL CALC-SCNC: 13 MMOL/L
AST SERPL W P-5'-P-CCNC: 30 U/L (ref 14–36)
BILIRUB SERPL-MCNC: 0.7 MG/DL (ref 0.2–1.3)
BUN SERPL-MCNC: 23 MG/DL (ref 7–17)
BUN/CREATININE RATIO,BUCR: 33 RATIO
CALCIUM SERPL-MCNC: 9.4 MG/DL (ref 8.4–10.2)
CHLORIDE SERPL-SCNC: 100 MMOL/L (ref 98–107)
CHOL/HDL RATIO,CHHD: 3 RATIO (ref 0–4)
CHOLEST SERPL-MCNC: 180 MG/DL (ref 0–200)
CK SERPL-CCNC: 184 U/L (ref 30–135)
CO2 SERPL-SCNC: 31 MMOL/L (ref 22–32)
CREAT SERPL-MCNC: 0.7 MG/DL (ref 0.7–1.2)
GLOBULIN,GLOB: 3
GLUCOSE SERPL-MCNC: 168 MG/DL (ref 65–105)
HDLC SERPL-MCNC: 57 MG/DL (ref 35–130)
LDL/HDL RATIO,LDHD: 2 RATIO
LDLC SERPL CALC-MCNC: 93 MG/DL (ref 0–130)
POTASSIUM SERPL-SCNC: 4.7 MMOL/L (ref 3.6–5)
PROT SERPL-MCNC: 7.4 G/DL (ref 6.3–8.2)
SODIUM SERPL-SCNC: 144 MMOL/L (ref 137–145)
TRIGL SERPL-MCNC: 148 MG/DL (ref 0–200)
VLDLC SERPL CALC-MCNC: 30 MG/DL

## 2019-04-09 NOTE — PATIENT INSTRUCTIONS
Arthritis: Care Instructions Your Care Instructions Arthritis, also called osteoarthritis, is a breakdown of the cartilage that cushions your joints. When the cartilage wears down, your bones rub against each other. This causes pain and stiffness. Many people have some arthritis as they age. Arthritis most often affects the joints of the spine, hands, hips, knees, or feet. You can take simple measures to protect your joints, ease your pain, and help you stay active. Follow-up care is a key part of your treatment and safety. Be sure to make and go to all appointments, and call your doctor if you are having problems. It's also a good idea to know your test results and keep a list of the medicines you take. How can you care for yourself at home? · Stay at a healthy weight. Being overweight puts extra strain on your joints. · Talk to your doctor or physical therapist about exercises that will help ease joint pain. ? Stretch. You may enjoy gentle forms of yoga to help keep your joints and muscles flexible. ? Walk instead of jog. Other types of exercise that are less stressful on the joints include riding a bicycle, swimming, ginna chi, or water exercise. ? Lift weights. Strong muscles help reduce stress on your joints. Stronger thigh muscles, for example, take some of the stress off of the knees and hips. Learn the right way to lift weights so you do not make joint pain worse. · Take your medicines exactly as prescribed. Call your doctor if you think you are having a problem with your medicine. · Take pain medicines exactly as directed. ? If the doctor gave you a prescription medicine for pain, take it as prescribed. ? If you are not taking a prescription pain medicine, ask your doctor if you can take an over-the-counter medicine. · Use a cane, crutch, walker, or another device if you need help to get around. These can help rest your joints.  You also can use other things to make life easier, such as a higher toilet seat and padded handles on kitchen utensils. · Do not sit in low chairs, which can make it hard to get up. · Put heat or cold on your sore joints as needed. Use whichever helps you most. You also can take turns with hot and cold packs. ? Apply heat 2 or 3 times a day for 20 to 30 minutesusing a heating pad, hot shower, or hot packto relieve pain and stiffness. ? Put ice or a cold pack on your sore joint for 10 to 20 minutes at a time. Put a thin cloth between the ice and your skin. When should you call for help? Call your doctor now or seek immediate medical care if: 
  · You have sudden swelling, warmth, or pain in any joint.  
  · You have joint pain and a fever or rash.  
  · You have such bad pain that you cannot use a joint.  
 Watch closely for changes in your health, and be sure to contact your doctor if: 
  · You have mild joint symptoms that continue even with more than 6 weeks of care at home.  
  · You have stomach pain or other problems with your medicine. Where can you learn more? Go to http://michel-brittny.info/. Enter V631 in the search box to learn more about \"Arthritis: Care Instructions. \" Current as of: Charline 10, 2018 Content Version: 11.9 © 5380-9414 [employer]. Care instructions adapted under license by [employer] (which disclaims liability or warranty for this information). If you have questions about a medical condition or this instruction, always ask your healthcare professional. [place] [address] any warranty or liability for your use of this information.

## 2019-04-09 NOTE — PROGRESS NOTES
Chief Complaint Patient presents with  Hypertension  Diabetes  Chronic Kidney Disease  Cholesterol Problem  Cough  
  x 4 days Visit Vitals /82 (BP 1 Location: Left arm, BP Patient Position: Sitting) Pulse 87 Temp 98.6 °F (37 °C) (Oral) Resp 17 Ht 5' 7\" (1.702 m) Wt 186 lb (84.4 kg) SpO2 97% BMI 29.13 kg/m² 1. Have you been to the ER, urgent care clinic since your last visit? Hospitalized since your last visit? No 
 
2. Have you seen or consulted any other health care providers outside of the [address] [place] [address] since your last visit? Include any pap smears or colon screening.  No

## 2019-04-10 LAB
EST. AVERAGE GLUCOSE BLD GHB EST-MCNC: 134 MG/DL
HBA1C MFR BLD: 6.3 % (ref 4.8–5.6)

## 2019-04-22 DIAGNOSIS — M13.0 ARTHRITIS OF MULTIPLE SITES: ICD-10-CM

## 2019-04-22 RX ORDER — DICLOFENAC SODIUM 75 MG/1
TABLET, DELAYED RELEASE ORAL
Qty: 60 TAB | Refills: 11 | Status: SHIPPED | OUTPATIENT
Start: 2019-04-22 | End: 2020-04-22

## 2019-04-22 NOTE — TELEPHONE ENCOUNTER
RX refill request from the patient/pharmacy. Patient last seen 04- with labs, and next appt. scheduled for 07-  Requested Prescriptions     Pending Prescriptions Disp Refills    diclofenac EC (VOLTAREN) 75 mg EC tablet [Pharmacy Med Name: *DICLOFENAC  75MG DR] 60 Tab 11     Sig: TAKE 1 TABLET TWICE A DAY WITH FOOD FOR PAIN AND INFLAMMATION   .

## 2019-06-18 RX ORDER — NATEGLINIDE 120 MG/1
TABLET ORAL
Qty: 90 TAB | Refills: 12 | Status: SHIPPED | OUTPATIENT
Start: 2019-06-18 | End: 2020-07-20

## 2019-06-18 NOTE — TELEPHONE ENCOUNTER
RX refill request from the patient/pharmacy. Patient last seen 04- with labs, and next appt. scheduled for 07-  Requested Prescriptions     Pending Prescriptions Disp Refills    nateglinide (STARLIX) 120 mg tablet [Pharmacy Med Name: NATEGLINIDE 120 MG] 90 Tab 12     Sig: TAKE 1 TABLET THREE TIMES DAILY BEFORE MEALS   .

## 2019-07-03 ENCOUNTER — OFFICE VISIT (OUTPATIENT)
Dept: INTERNAL MEDICINE CLINIC | Age: 74
End: 2019-07-03

## 2019-07-03 VITALS
HEIGHT: 67 IN | SYSTOLIC BLOOD PRESSURE: 124 MMHG | WEIGHT: 184 LBS | BODY MASS INDEX: 28.88 KG/M2 | RESPIRATION RATE: 16 BRPM | TEMPERATURE: 98.9 F | OXYGEN SATURATION: 95 % | DIASTOLIC BLOOD PRESSURE: 80 MMHG | HEART RATE: 94 BPM

## 2019-07-03 DIAGNOSIS — R09.1 PLEURISY: Primary | ICD-10-CM

## 2019-07-03 RX ORDER — PREDNISONE 5 MG/1
TABLET ORAL
Qty: 48 TAB | Refills: 0 | Status: SHIPPED | OUTPATIENT
Start: 2019-07-03 | End: 2019-07-25 | Stop reason: ALTCHOICE

## 2019-07-03 NOTE — PROGRESS NOTES
Batsheva Camacho presents today at the clinic for    Chief Complaint   Patient presents with    Breathing Problem     \"hurts to breath\" x 3 days        Wt Readings from Last 3 Encounters:   07/03/19 184 lb (83.5 kg)   04/09/19 186 lb (84.4 kg)   01/02/19 187 lb 6.4 oz (85 kg)     Temp Readings from Last 3 Encounters:   07/03/19 98.9 °F (37.2 °C) (Oral)   04/09/19 98.6 °F (37 °C) (Oral)   11/13/18 98.7 °F (37.1 °C)     BP Readings from Last 3 Encounters:   07/03/19 124/80   04/09/19 130/82   01/02/19 138/78     Pulse Readings from Last 3 Encounters:   07/03/19 94   04/09/19 87   01/02/19 89       Health Maintenance Due   Topic    DTaP/Tdap/Td series (1 - Tdap)    Shingrix Vaccine Age 50> (1 of 2)    GLAUCOMA SCREENING Q2Y     EYE EXAM RETINAL OR DILATED          Learning Assessment:  :     Learning Assessment 8/28/2017   PRIMARY LEARNER Patient   HIGHEST LEVEL OF EDUCATION - PRIMARY LEARNER  SOME COLLEGE   PRIMARY LANGUAGE ENGLISH   LEARNER PREFERENCE PRIMARY LISTENING     READING   ANSWERED BY patient   RELATIONSHIP SELF       Depression Screening:  :     3 most recent PHQ Screens 4/9/2019   Little interest or pleasure in doing things Not at all   Feeling down, depressed, irritable, or hopeless Not at all   Total Score PHQ 2 0       Fall Risk Assessment:  :     Fall Risk Assessment, last 12 mths 4/9/2019   Able to walk? Yes   Fall in past 12 months? No       Abuse Screening:  :     Abuse Screening Questionnaire 11/13/2018 8/28/2017   Do you ever feel afraid of your partner? N N   Are you in a relationship with someone who physically or mentally threatens you? N N   Is it safe for you to go home? Y Y       Coordination of Care Questionnaire:  :     1. Have you been to the ER, urgent care clinic since your last visit? Hospitalized since your last visit? no    2. Have you seen or consulted any other health care providers outside of the [address] [place] [address] since your last visit?   Include any pap smears or colon screening.  no

## 2019-07-03 NOTE — PROGRESS NOTES
Subjective:   Monika Valdez is a 69 y.o. female      Chief Complaint   Patient presents with    Breathing Problem     \"hurts to breath\" x 3 days        History of present illness: She presents today complaining of recurrence of her left pleuritic chest pain she has had a couple times before. This is been bothering for about 3 days. She notes no shortness of breath or cough it just hurts to take a deep breath. She notes no palpitations she denies any other cardiorespiratory complaints and notes that this feels very much exactly like what she had before as far as the pleurisy. She has no other complaints on complete review of systems.     Patient Active Problem List   Diagnosis Code    SBO (small bowel obstruction) ([facility] K56.609    Crohn disease ([facility] K50.90    Essential hypertension I10    Controlled type 2 diabetes mellitus with stage 2 chronic kidney disease, without long-term current use of insulin ([facility]) E11.22, N18.2    Mixed hyperlipidemia E78.2    Primary osteoarthritis involving multiple joints M15.0    PAF (paroxysmal atrial fibrillation) ([facility]) I48.0    Post-menopausal Z78.0    Acquired hypothyroidism E03.9    Stage 2 chronic kidney disease N18.2    Medicare annual wellness visit, subsequent Z00.00    Colon cancer screening Z12.11    Acute bronchitis J20.9    Acute non-recurrent maxillary sinusitis J01.00    Chest pain on breathing R07.1    Pleurisy R09.1      Past Medical History:   Diagnosis Date    Acquired hypothyroidism 7/26/2017    CKD (chronic kidney disease) 7/26/2017    Cro[facility]'s disease ([facility]     Diabetes ([facility]     Hypertension     On statin therapy 7/26/2017    Post-menopausal 7/26/2017      Allergies   Allergen Reactions    Lisinopril Cough      Family History   Problem Relation Age of Onset    Diabetes Mother     Heart Failure Mother     Cancer Father         stomach      Social History     Socioeconomic History    Marital status:      Spouse name: Not on file    Number of children: Not on file    Years of education: Not on file    Highest education level: Not on file   Occupational History    Not on file   Social Needs    Financial resource strain: Not on file    Food insecurity:     Worry: Not on file     Inability: Not on file    Transportation needs:     Medical: Not on file     Non-medical: Not on file   Tobacco Use    Smoking status: Former Smoker     Packs/day: 1.00     Last attempt to quit:      Years since quittin.5    Smokeless tobacco: Never Used   Substance and Sexual Activity    Alcohol use: Yes     Comment: 2 drinks per month     Drug use: No    Sexual activity: Never   Lifestyle    Physical activity:     Days per week: Not on file     Minutes per session: Not on file    Stress: Not on file   Relationships    Social connections:     Talks on phone: Not on file     Gets together: Not on file     Attends Anabaptism service: Not on file     Active member of club or organization: Not on file     Attends meetings of clubs or organizations: Not on file     Relationship status: Not on file    Intimate partner violence:     Fear of current or ex partner: Not on file     Emotionally abused: Not on file     Physically abused: Not on file     Forced sexual activity: Not on file   Other Topics Concern    Not on file   Social History Narrative    Not on file     Prior to Admission medications    Medication Sig Start Date End Date Taking?  Authorizing Provider   predniSONE (STERAPRED) 5 mg dose pack See administration instruction per 5mg dose pack 7/3/19  Yes Azra Vail MD   nateglinide (STARLIX) 120 mg tablet TAKE 1 TABLET THREE TIMES DAILY BEFORE MEALS 19  Yes Azra Vail MD   diclofenac EC (VOLTAREN) 75 mg EC tablet TAKE 1 TABLET TWICE A DAY WITH FOOD FOR PAIN AND INFLAMMATION 19  Yes Azra Vail MD   levothyroxine (SYNTHROID) 50 mcg tablet TAKE 1 TABLET EVERY DAY 19  Yes Azra Vail MD hydroCHLOROthiazide (HYDRODIURIL) 25 mg tablet TAKE 1 TABLET EVERY DAY 10/30/18  Yes Brian Vann MD   amLODIPine (NORVASC) 2.5 mg tablet TAKE 1 TABLET EVERY DAY 9/25/18  Yes Brian Vann MD   GUAIFENESIN/PSEUDOEPHEDRNE HCL [facility] D PO) Take  by mouth as needed. Yes Provider, Historical   MULTIVIT,TH IRON,OTHER MIN (COMPLETE MULTIVITAMIN PO) Take 1 Tab by mouth daily. Indications: Complete multivitamin Women's 50+   Yes Other, MD Cordelia   calcium-cholecalciferol, D3, (CALTRATE 600+D) tablet Take 1 Tab by mouth daily. Yes Other, MD Cordelia        Review of Systems              Constitutional:  She denies fever, weight loss, sweats or fatigue. EYES: No blurred or double vision,               ENT: no nasal congestion, no headache or dizziness. No difficulty with               swallowing, taste, speech or smell. Respiratory:  No cough, wheezing or shortness of breath. No sputum production. Left chest pain with breathing  Cardiac:  Denies chest pain, palpitations, unexplained indigestion, syncope, edema, PND or orthopnea. GI:  No changes in bowel movements, no abdominal pain, no bloating, anorexia, nausea, vomiting or heartburn. :  No frequency or dysuria. Denies incontinence or sexual dysfunction. Extremities:  No joint pain, stiffness or swelling  Back:.no pain or soreness  Skin:  No recent rashes or mole changes. Neurological:  No numbness, tingling, burning paresthesias or loss of motor strength. No syncope, dizziness, frequent headaches or memory loss. Hematologic:  No easy bruising  Lymphatic: No lymph node enlargement    Objective:     Vitals:    07/03/19 1533   BP: 124/80   Pulse: 94   Resp: 16   Temp: 98.9 °F (37.2 °C)   TempSrc: Oral   SpO2: 95%   Weight: 184 lb (83.5 kg)   Height: 5' 7\" (1.702 m)   PainSc:   8   PainLoc: Back       Body mass index is 28.82 kg/m².    Physical Examination:              General Appearance:  Well-developed, well-nourished, no acute distress. HEENT:      Ears:  The TMs and ear canals were clear. Eyes:  The pupillary responses were normal.  Extraocular muscle function intact. Lids and conjunctiva not injected. Funduscopic exam revealed sharp disc margins. Nares: Clear w/o edema or erythema  Pharynx:  Clear with teeth in good repair. No masses were noted. Neck:  Supple without thyromegaly or adenopathy. No JVD noted. No carotid                bruits. Lungs:  Clear to auscultation and percussion. Cardiac:  Regular rate and rhythm without murmur. PMI not displaced. No gallop, rub or click. Abdominal: Soft, non-tender, no hepata-spleenomegally or masses  Extremities:  No clubbing, cyanosis or edema. Skin:  No rash or unusual mole changes noted. Lymph Nodes:  None felt in the cervical, supraclavicular, axillary or inguinal region. Neurological: . DTRs 2+ and symmetric. Station and gait normal.   Hematologic:   No purpura or petechiae        Assessment/Plan:         1. Pleurisy        Impressions/Plan:  Impression  1. Pleurisy we will treat this with prednisone 5 mg 12-day Dosepak. She will notify me if symptoms increase or if it does not resolve. Otherwise follow-up as previously scheduled    Orders Placed This Encounter    predniSONE (STERAPRED) 5 mg dose pack           No results found for any visits on 07/03/19. Chey Waite MD    The patient was given after the visit summary the patient verbalized an understanding of the plans and problems as explained.

## 2019-07-24 NOTE — PROGRESS NOTES
Chief Complaint   Patient presents with    Hypertension     3 month follow up    Diabetes    Chronic Kidney Disease       SUBJECTIVE:    Yvette Chauhan is a 69 y.o. female who returns in follow-up for her hypertension, diabetes, hyperlipidemia, paroxysmal atrial fibrillation, DJD, CKD stage II and other medical problems. She is taking her medications and trying to follow her diet and trying to remain physically active. She currently denies any chest pain, shortness of breath, palpitations, PND, orthopnea or other cardiorespiratory complaints. She notes no GI or  complaints. She notes no headaches, dizziness or neurologic complaints. There are no current arthritic complaints and no other complaints on complete review of systems. Current Outpatient Medications   Medication Sig Dispense Refill    nateglinide (STARLIX) 120 mg tablet TAKE 1 TABLET THREE TIMES DAILY BEFORE MEALS 90 Tab 12    diclofenac EC (VOLTAREN) 75 mg EC tablet TAKE 1 TABLET TWICE A DAY WITH FOOD FOR PAIN AND INFLAMMATION 60 Tab 11    levothyroxine (SYNTHROID) 50 mcg tablet TAKE 1 TABLET EVERY DAY 30 Tab 11    hydroCHLOROthiazide (HYDRODIURIL) 25 mg tablet TAKE 1 TABLET EVERY DAY 30 Tab 11    amLODIPine (NORVASC) 2.5 mg tablet TAKE 1 TABLET EVERY DAY 30 Tab 11    GUAIFENESIN/PSEUDOEPHEDRNE HCL (MUCINEX D PO) Take  by mouth as needed.  MULTIVIT,TH IRON,OTHER MIN (COMPLETE MULTIVITAMIN PO) Take 1 Tab by mouth daily. Indications: Complete multivitamin Women's 50+      calcium-cholecalciferol, D3, (CALTRATE 600+D) tablet Take 1 Tab by mouth daily.        Past Medical History:   Diagnosis Date    Acquired hypothyroidism 7/26/2017    CKD (chronic kidney disease) 7/26/2017    Cro[facility]'s disease ([facility]     Diabetes ([facility]     Hypertension     On statin therapy 7/26/2017    Pleurisy 07/17/2019    Post-menopausal 7/26/2017     Past Surgical History:   Procedure Laterality Date    BREAST SURGERY PROCEDURE UNLISTED      cyst removed right    COLONOSCOPY N/A 2018    COLONOS[facility]akov [facility]illo [facility] ENDOSCOPY    COLONOSCOPY,DIAGNOSTIC  2018         HX BREAST BIOPSY Right     over 30 years  negative    HX GI      tumor from colon removed    HX ORTHOPAEDIC      right wrist    HX OTHER SURGICAL      Bowel Obstruction    [facility] COLON[facility] W/BIOPSY SINGLE/MULTIPLE  2014          Allergies   Allergen Reactions    Lisinopril Cough       REVIEW OF SYSTEMS:  General: negative for - chills or fever, or weight loss or gain  ENT: negative for - headaches, nasal congestion or tinnitus  Eyes: no blurred or visual changes  Neck: No stiffness or swollen nodes  Respiratory: negative for - cough, hemoptysis, shortness of breath or wheezing  Cardiovascular : negative for - chest pain, edema, palpitations or shortness of breath  Gastrointestinal: negative for - abdominal pain, blood in stools, heartburn or nausea/vomiting  Genito-Urinary: no dysuria, trouble voiding, or hematuria  Musculoskeletal: negative for - gait disturbance, joint pain, joint stiffness or joint swelling  Neurological: no TIA or stroke symptoms  Hematologic: no bruises, no bleeding  Lymphatic: no swollen glands  Integument: no lumps, mole changes, nail changes or rash  Endocrine:no malaise/lethargy poly uria or polydipsia or unexpected weight changes        Social History     Socioeconomic History    Marital status:      Spouse name: Not on file    Number of children: Not on file    Years of education: Not on file    Highest education level: Not on file   Tobacco Use    Smoking status: Former Smoker     Packs/day: 1.00     Last attempt to quit:      Years since quittin.5    Smokeless tobacco: Never Used   Substance and Sexual Activity    Alcohol use: Yes     Comment: 2 drinks per month     Drug use: No    Sexual activity: Never     Family History   Problem Relation Age of Onset    Diabetes Mother     Heart Failure Mother     Cancer Father         stomach       OBJECTIVE:     Visit Vitals  /76 (BP 1 Location: Left arm, BP Patient Position: Sitting)   Pulse 87   Temp 98.4 °F (36.9 °C) (Oral)   Resp 18   Ht 5' 7\" (1.702 m)   Wt 184 lb 9.6 oz (83.7 kg)   SpO2 98%   BMI 28.91 kg/m²     CONSTITUTIONAL:   well nourished, appears age appropriate  EYES: sclera anicteric, PERRL, EOMI  ENMT:nares clear, moist mucous membranes, pharynx clear  NECK: supple. Thyroid normal, No JVD or bruits  RESPIRATORY: Chest: clear to ascultation and percussion, normal inspiratory effort  CARDIOVASCULAR: Heart: regular rate and rhythm no murmurs, rubs or gallops, PMI not displaced, No thrills  GASTROINTESTINAL: Abdomen: non distended, soft, non tender, bowel sounds normal  HEMATOLOGIC: no purpura, petechiae or bruising  LYMPHATIC: No lymph node enlargemant  MUSCULOSKELETAL: Extremities: no edema or active synovitis, pulse 1+   INTEGUMENT: No unusual rashes or suspicious skin lesions noted. Nails appear normal.  PERIPHERAL VASCULAR: normal pulses femoral, PT and DP  NEUROLOGIC: non-focal exam, A & O X 3  PSYCHIATRIC:, appropriate affect     ASSESSMENT:   1. Essential hypertension    2. Controlled type 2 diabetes mellitus with stage 2 chronic kidney disease, without long-term current use of insulin ([address]    3. Mixed hyperlipidemia    4. PAF (paroxysmal atrial fibrillation) ([address]    5. Primary osteoarthritis involving multiple joints    6. Stage 2 chronic kidney disease      Impression  1. Hypertension is controlled so continue current therapy reviewed with her. 2.  Diabetes repeat status pending and prior lab reviewed no make adjustments if necessary. 3.  Hyperlipidemia prior lab reviewed and repeat status pending I will adjust if needed. 4   Paroxysmal atrial fibrillation that is stable and no evidence of recent palpitations  5.   DJD that is stable next #6 CKD stage II repeat status pending  I will call with lab results and make further recommendations or adjustments if necessary. Follow-up as scheduled again for 3 months or sooner if there is a problem. PLAN:  .  Orders Placed This Encounter    METABOLIC PANEL, COMPREHENSIVE ([facility])    LIPID PANEL ([facility])    CK ([facility])    HEMOGLOBIN A1C W/O EAG ([facility])         ATTENTION:   This medical record was transcribed using an electronic medical records system. Although proofread, it may and can contain electronic and spelling errors. Other human spelling and other errors may be present. Corrections may be executed at a later time. Please feel free to contact us for any clarifications as needed. Follow-up and Dispositions    · Return in about 3 months (around 10/25/2019). No results found for any visits on 07/25/19. Nenita Nayak MD    The patient verbalized understanding of the problems and plans as explained.

## 2019-07-25 ENCOUNTER — OFFICE VISIT (OUTPATIENT)
Dept: INTERNAL MEDICINE CLINIC | Age: 74
End: 2019-07-25

## 2019-07-25 VITALS
TEMPERATURE: 98.4 F | HEART RATE: 87 BPM | OXYGEN SATURATION: 98 % | HEIGHT: 67 IN | WEIGHT: 184.6 LBS | SYSTOLIC BLOOD PRESSURE: 132 MMHG | DIASTOLIC BLOOD PRESSURE: 76 MMHG | BODY MASS INDEX: 28.97 KG/M2 | RESPIRATION RATE: 18 BRPM

## 2019-07-25 DIAGNOSIS — I10 ESSENTIAL HYPERTENSION: Primary | ICD-10-CM

## 2019-07-25 DIAGNOSIS — E11.22 CONTROLLED TYPE 2 DIABETES MELLITUS WITH STAGE 2 CHRONIC KIDNEY DISEASE, WITHOUT LONG-TERM CURRENT USE OF INSULIN (HCC): ICD-10-CM

## 2019-07-25 DIAGNOSIS — N18.2 STAGE 2 CHRONIC KIDNEY DISEASE: ICD-10-CM

## 2019-07-25 DIAGNOSIS — M15.9 PRIMARY OSTEOARTHRITIS INVOLVING MULTIPLE JOINTS: ICD-10-CM

## 2019-07-25 DIAGNOSIS — E78.2 MIXED HYPERLIPIDEMIA: ICD-10-CM

## 2019-07-25 DIAGNOSIS — I48.0 PAF (PAROXYSMAL ATRIAL FIBRILLATION) (HCC): ICD-10-CM

## 2019-07-25 DIAGNOSIS — N18.2 CONTROLLED TYPE 2 DIABETES MELLITUS WITH STAGE 2 CHRONIC KIDNEY DISEASE, WITHOUT LONG-TERM CURRENT USE OF INSULIN (HCC): ICD-10-CM

## 2019-07-25 LAB
A-G RATIO,AGRAT: 1.6 RATIO
ALBUMIN SERPL-MCNC: 4.2 G/DL (ref 3.9–5.4)
ALP SERPL-CCNC: 99 U/L (ref 38–126)
ALT SERPL-CCNC: 41 U/L (ref 9–52)
ANION GAP SERPL CALC-SCNC: 12 MMOL/L
AST SERPL W P-5'-P-CCNC: 30 U/L (ref 14–36)
BILIRUB SERPL-MCNC: 0.7 MG/DL (ref 0.2–1.3)
BUN SERPL-MCNC: 17 MG/DL (ref 7–17)
BUN/CREATININE RATIO,BUCR: 21 RATIO
CALCIUM SERPL-MCNC: 9.4 MG/DL (ref 8.4–10.2)
CHLORIDE SERPL-SCNC: 99 MMOL/L (ref 98–107)
CHOL/HDL RATIO,CHHD: 3 RATIO (ref 0–4)
CHOLEST SERPL-MCNC: 204 MG/DL (ref 0–200)
CK SERPL-CCNC: 155 U/L (ref 30–135)
CO2 SERPL-SCNC: 29 MMOL/L (ref 22–32)
CREAT SERPL-MCNC: 0.8 MG/DL (ref 0.7–1.2)
GLOBULIN,GLOB: 2.6
GLUCOSE SERPL-MCNC: 154 MG/DL (ref 65–105)
HDLC SERPL-MCNC: 63 MG/DL (ref 35–130)
LDL/HDL RATIO,LDHD: 1 RATIO
LDLC SERPL CALC-MCNC: 89 MG/DL (ref 0–130)
POTASSIUM SERPL-SCNC: 4.7 MMOL/L (ref 3.6–5)
PROT SERPL-MCNC: 6.8 G/DL (ref 6.3–8.2)
SODIUM SERPL-SCNC: 140 MMOL/L (ref 137–145)
TRIGL SERPL-MCNC: 259 MG/DL (ref 0–200)
VLDLC SERPL CALC-MCNC: 52 MG/DL

## 2019-07-25 NOTE — PATIENT INSTRUCTIONS

## 2019-07-25 NOTE — PROGRESS NOTES
Chief Complaint   Patient presents with    Hypertension     3 month follow up    Diabetes    Chronic Kidney Disease     Visit Vitals  /76 (BP 1 Location: Left arm, BP Patient Position: Sitting)   Pulse 87   Temp 98.4 °F (36.9 °C) (Oral)   Resp 18   Ht 5' 7\" (1.702 m)   Wt 184 lb 9.6 oz (83.7 kg)   SpO2 98%   BMI 28.91 kg/m²     1. Have you been to the ER, urgent care clinic since your last visit? Hospitalized since your last visit? No    2. Have you seen or consulted any other health care providers outside of the [address] [place] [address] since your last visit? Include any pap smears or colon screening.  No

## 2019-07-26 LAB — HBA1C MFR BLD HPLC: 6.2 % (ref 4–5.7)

## 2019-08-20 ENCOUNTER — HOSPITAL ENCOUNTER (OUTPATIENT)
Dept: MAMMOGRAPHY | Age: 74
Discharge: HOME | End: 2019-08-20
Attending: INTERNAL MEDICINE
Payer: MEDICARE

## 2019-08-20 DIAGNOSIS — Z12.31 ENCOUNTER FOR MAMMOGRAM TO ESTABLISH BASELINE MAMMOGRAM: ICD-10-CM

## 2019-08-20 PROCEDURE — 77067 SCR MAMMO BI INCL CAD: CPT

## 2019-09-23 DIAGNOSIS — I10 ESSENTIAL HYPERTENSION: ICD-10-CM

## 2019-09-23 RX ORDER — AMLODIPINE BESYLATE 2.5 MG/1
TABLET ORAL
Qty: 30 TAB | Refills: 11 | Status: SHIPPED | OUTPATIENT
Start: 2019-09-23 | End: 2020-09-21

## 2019-09-23 NOTE — TELEPHONE ENCOUNTER
PCP: Sonya Headley MD    Last appt: 7/25/2019  Future Appointments   Date Time Provider Zeny Oneil   10/31/2019  8:40 AM Sonya Headley MD [address]       Requested Prescriptions     Pending Prescriptions Disp Refills    amLODIPine (NORVASC) 2.5 mg tablet [Pharmacy Med Name: AMLODIPINE 2.5MG] 30 Tab 11     Sig: TAKE 1 TABLET EVERY DAY       Prior labs and Blood pressures:  BP Readings from Last 3 Encounters:   07/25/19 132/76   07/03/19 124/80   04/09/19 130/82     Lab Results   Component Value Date/Time    Sodium 140 07/25/2019 08:21 AM    Potassium 4.7 07/25/2019 08:21 AM    Chloride 99 07/25/2019 08:21 AM    CO2 29.0 07/25/2019 08:21 AM    Anion gap 12 07/25/2019 08:21 AM    Glucose 154 (H) 07/25/2019 08:21 AM    BUN 17.0 07/25/2019 08:21 AM    Creatinine 0.8 07/25/2019 08:21 AM    BUN/Creatinine ratio 21 07/25/2019 08:21 AM    GFR est AA >60 07/25/2019 08:21 AM    GFR est non-AA >60 07/25/2019 08:21 AM    Calcium 9.4 07/25/2019 08:21 AM     Lab Results   Component Value Date/Time    Hemoglobin A1c 6.2 (H) 07/25/2019 08:20 AM    Hemoglobin A1c (POC) 5.6 06/26/2018 10:24 AM     Lab Results   Component Value Date/Time    Cholesterol, total 204 (H) 07/25/2019 08:21 AM    Cholesterol (POC) 184.0 06/26/2018 10:24 AM    HDL Cholesterol 63 07/25/2019 08:21 AM    HDL Cholesterol (POC) 62.0 06/26/2018 10:24 AM    LDL Cholesterol (POC) 90.8 06/26/2018 10:24 AM    LDL, calculated 89 07/25/2019 08:21 AM    VLDL, calculated 49 (H) 01/02/2019 10:02 AM    VLDL 52 07/25/2019 08:21 AM    Triglyceride 259 (H) 07/25/2019 08:21 AM    Triglycerides (POC) 156.0 06/26/2018 10:24 AM    CHOL/HDL Ratio 3 07/25/2019 08:21 AM     No results found for: YING Guzman    Lab Results   Component Value Date/Time    TSH 5.170 (H) 09/20/2018 11:50 AM

## 2019-10-29 RX ORDER — HYDROCHLOROTHIAZIDE 25 MG/1
TABLET ORAL
Qty: 30 TAB | Refills: 11 | Status: SHIPPED | OUTPATIENT
Start: 2019-10-29 | End: 2020-10-26

## 2019-10-29 NOTE — TELEPHONE ENCOUNTER
RX refill request from the patient/pharmacy. Patient last seen 07- with labs, and next appt. scheduled for 10-  Requested Prescriptions     Pending Prescriptions Disp Refills    hydroCHLOROthiazide (HYDRODIURIL) 25 mg tablet [Pharmacy Med Name: HYDROCHLOROTHIAZIDE  25MG] 30 Tab 11     Sig: TAKE 1 TABLET EVERY DAY   .

## 2019-10-30 PROBLEM — Z13.39 ALCOHOL SCREENING: Status: ACTIVE | Noted: 2019-10-30

## 2019-10-30 NOTE — PROGRESS NOTES
This is a Subsequent Medicare Annual Wellness Visit providing Personalized Prevention Plan Services (PPPS) (Performed 12 months after initial AWV and PPPS )    I have reviewed the patient's medical history in detail and updated the computerized patient record. She presents today for Medicare subsequent annual wellness examination and screening questionnaire. She is also here in follow-up of her multiple medical problems include hypertension, diabetes, hyperlipidemia, paroxysmal atrial fibrillation, history of Crohn's disease and prior small bowel obstruction, CKD stage II, DJD and other medical problems. She is taking her medications and trying to follow her diet and trying to remain physically active. She currently denies any chest pain, shortness of breath, palpitations, PND, orthopnea or other cardiorespiratory complaints. There are no GI or  complaints. She notes no headaches, dizziness or neurologic complaints. There are no other complaints on complete review of systems.     History     Past Medical History:   Diagnosis Date    Acquired hypothyroidism 2017    CKD (chronic kidney disease) 2017    [facility] [address] [facility]     Diabetes ([facility] [address] [facility]     Hypertension     On statin therapy 2017    Pleurisy 2019    Post-menopausal 2017      Past Surgical History:   Procedure Laterality Date    BREAST SURGERY PROCEDURE UNLISTED      cyst removed right    COLONOSCOPY N/A 2018    COL[facility] performed [facility] at [facility]COPY    COLONOSCOPY,DIAGNOSTIC  2018         HX BREAST BIOPSY Right     over 30 years  negative    HX GI      tumor from colon removed    HX ORTHOPAEDIC      right wrist    HX OTHER SURGICAL      Bowel Obstruction    ID COLONOSCOPY W/BIOPSY SINGLE/MULTIPLE  2014          Social History     Tobacco Use    Smoking status: Former Smoker     Packs/day: 1.00     Last attempt to quit: 1979     Years since quittin.8    Smokeless tobacco: Never Used   Substance Use Topics    Alcohol use: Yes     Comment: 2 drinks per month     Drug use: No     Current Outpatient Medications   Medication Sig Dispense Refill    hydroCHLOROthiazide (HYDRODIURIL) 25 mg tablet TAKE 1 TABLET EVERY DAY 30 Tab 11    amLODIPine (NORVASC) 2.5 mg tablet TAKE 1 TABLET EVERY DAY 30 Tab 11    nateglinide (STARLIX) 120 mg tablet TAKE 1 TABLET THREE TIMES DAILY BEFORE MEALS 90 Tab 12    diclofenac EC (VOLTAREN) 75 mg EC tablet TAKE 1 TABLET TWICE A DAY WITH FOOD FOR PAIN AND INFLAMMATION 60 Tab 11    levothyroxine (SYNTHROID) 50 mcg tablet TAKE 1 TABLET EVERY DAY 30 Tab 11    GUAIFENESIN/PSEUDOEPHEDRNE HCL (MUCINEX D PO) Take  by mouth as needed.  MULTIVIT,TH IRON,OTHER MIN (COMPLETE MULTIVITAMIN PO) Take 1 Tab by mouth daily. Indications: Complete multivitamin Women's 50+      calcium-cholecalciferol, D3, (CALTRATE 600+D) tablet Take 1 Tab by mouth daily.        Allergies   Allergen Reactions    Lisinopril Cough     Family History   Problem Relation Age of Onset    Diabetes Mother     Heart Failure Mother     Cancer Father         stomach       Patient Active Problem List    Diagnosis    Stage 2 chronic kidney disease    PAF (paroxysmal atrial fibrillation) (HCC)     Self Limiting      Essential hypertension    Controlled type 2 diabetes mellitus with stage 2 chronic kidney disease, without long-term current use of insulin (HCC)    Mixed hyperlipidemia    Primary osteoarthritis involving multiple joints    Acquired hypothyroidism    Alcohol screening    Pleurisy    Chest pain on breathing    Acute non-recurrent maxillary sinusitis    Acute bronchitis    Medicare annual wellness visit, subsequent    Colon cancer screening    Post-menopausal    SBO (small bowel obstruction) ([facility] [address]    Crohn disease ([facility] [address]       Patient Care Team:  Hubert Grant MD as PCP - General (Internal Medicine)  Hubert Grant MD as PCP [facility] Empaneled Provider    Depression Risk Factor Screening:     3 most recent PHQ Screens 10/31/2019   Little interest or pleasure in doing things Not at all   Feeling down, depressed, irritable, or hopeless Not at all   Total Score PHQ 2 0     Alcohol Risk Factor Screening:     Alcohol Risk Factor Screening:   Do you average 1 drink per night or more than 7 drinks a week:  No    On any one occasion in the past three months have you have had more than 3 drinks containing alcohol:  No    Functional Ability and Level of Safety:     Fall Risk     Fall Risk Assessment, last 12 mths 10/31/2019   Able to walk? Yes   Fall in past 12 months? No       Hearing Loss   mild    Activities of Daily Living   Self-care. ADL Assessment 10/31/2019   Feeding yourself No Help Needed   Getting from bed to chair No Help Needed   Getting dressed No Help Needed   Bathing or showering No Help Needed   Walk across the room (includes cane/walker) No Help Needed   Using the telphone No Help Needed   Taking your medications No Help Needed   Preparing meals No Help Needed   Managing money (expenses/bills) No Help Needed   Moderately strenuous housework (laundry) No Help Needed   Shopping for personal items (toiletries/medicines) No Help Needed   Shopping for groceries No Help Needed   Driving No Help Needed   Climbing a flight of stairs No Help Needed   Getting to places beyond walking distances No Help Needed       Abuse Screen   Patient is not abused    Social History     Social History Narrative    Not on file       Review of Systems      ROS:    Constitutional: She denies fevers, weight loss, sweats. Eyes: No blurred or double vision. ENT: No difficulty with swallowing, taste, speech or smell. NECK: no stiffness swelling or lymph node enlargement  Respiratory: No cough wheezing or shortness of breath. Cardiovascular: Denies chest pain, palpitations, unexplained indigestion or syncope.   Breast: She has noted no masses or lumps and no discharge or axillary swelling  Gastrointestinal:  No changes in bowel movements, no abdominal pain, no bloating. Genitourinary: No discharge or abnormal bleeding or pain  Extremities: No joint pain, stiffness or swelling. Neurological:  No numbness, tingling, burring paresthesias or loss of motor strength. No syncope, dizziness or frequent headache  Skin:  No recent rashes or mole changes. Psychiatric/Behavioral:  Negative for depression. The patient is not nervous/anxious. HEMATOLOGIC: no easy bruising or bleeding gums  Endocrine: no sweats of urinary frequency or excessive thirst    Physical Examination     Evaluation of Cognitive Function:  Mood/affect:  happy  Appearance: age appropriate  Family member/caregiver input: none    Visit Vitals  /78 (BP 1 Location: Left arm, BP Patient Position: Sitting)   Pulse 77   Temp 98.5 °F (36.9 °C) (Oral)   Resp 18   Ht 5' 7\" (1.702 m)   Wt 184 lb 6.4 oz (83.6 kg)   SpO2 95%   BMI 28.88 kg/m²     Vitals:    10/31/19 0855   BP: 134/78   Pulse: 77   Resp: 18   Temp: 98.5 °F (36.9 °C)   TempSrc: Oral   SpO2: 95%   Weight: 184 lb 6.4 oz (83.6 kg)   Height: 5' 7\" (1.702 m)   PainSc:   0 - No pain        PHYSICAL EXAM:    General appearance - alert, well appearing, and in no distress  Mental status - alert, oriented to person, place, and time  HEENT:  Ears - bilateral TM's and external ear canals clear  Eyes - pupillary responses were normal.  Extraocular muscle function intact. Lids and conjunctiva not injected. Fundoscopic exam revealed sharp disc margins. eye movements intact  Pharynx- clear with teeth in good repair. No masses were noted  Neck - supple without thyromegaly or burit. No JVD noted  Lungs - clear to auscultation and percussion  Cardiac- normal rate, regular rhythm without murmurs. PMI not displaced. No gallop, rub or click  Breast: deferred to GYN  Abdomen - flat, soft, non-tender without palpable organomegaly or mass.   No pulsatile mass was felt, and not bruit was heard. Bowel sounds were active   Female - deferred to GYN  Rectal - deferred to GYN  Extremities -  no clubbing cyanosis or edema  Lymphatics - no palpable lymphadenopathy, no hepatosplenomegaly  Peripheral vascular - Dorsalis pedis and posterior tibial pulses felt without difficulty  Skin - no rash or unusual mole change noted  Neurological - Cranial nerves II-XII grossly intact. Motor strength 5/5. DTR's 2+ and symmetric. Station and gait normal  Back exam - full range of motion, no tenderness, palpable spasm or pain on motion  Musculoskeletal - no joint tenderness, deformity or swelling  Hematologic: no purpura, petechiae or bruising    Results for orders placed or performed in visit on 22/93/85   METABOLIC PANEL, COMPREHENSIVE   Result Value Ref Range    Glucose 154 (H) 65 - 105 mg/dL    BUN 17.0 7.0 - 17.0 mg/dL    Creatinine 0.8 0.7 - 1.2 mg/dL    Sodium 140 137 - 145 mmol/L    Potassium 4.7 3.6 - 5.0 mmol/L    Chloride 99 98 - 107 mmol/L    CO2 29.0 22.0 - 32.0 mmol/L    Calcium 9.4 8.4 - 10.2 mg/dl    Protein, total 6.8 6.3 - 8.2 g/dL    Albumin 4.2 3.9 - 5.4 g/dL    AST (SGOT) 30.0 14.0 - 36.0 U/L    ALT (SGPT) 41 9 - 52 U/L    Alk.  phosphatase 99 38 - 126 U/L    Bilirubin, total 0.7 0.2 - 1.3 mg/dL    BUN/Creatinine ratio 21 Ratio    GFR est AA >60 mL/min/1.73m2    GFR est non-AA >60 mL/min/1.73m2    Globulin 2.60     A-G Ratio 1.6 Ratio    Anion gap 12 mmol/L   LIPID PANEL   Result Value Ref Range    Cholesterol, total 204 (H) 0 - 200 mg/dL    Triglyceride 259 (H) 0 - 200 mg/dL    HDL Cholesterol 63 35 - 130 mg/dL    VLDL 52 mg/dL    LDL, calculated 89 0 - 130 mg/dL    CHOL/HDL Ratio 3 0 - 4 Ratio    LDL/HDL Ratio 1 Ratio   CK   Result Value Ref Range    .00 (H) 30.00 - 135.00 U/L   HEMOGLOBIN A1C W/O EAG   Result Value Ref Range    Hemoglobin A1c 6.2 (H) 4.0 - 5.7 %       Advice/Referrals/Counseling   Education and counseling provided:  Are appropriate based on today's review and evaluation  End-of-Life planning (with patient's consent)  Pneumococcal Vaccine  Influenza Vaccine  Colorectal cancer screening tests      Assessment/Plan     ASSESSMENT:   1. Essential hypertension    2. Controlled type 2 diabetes mellitus with stage 2 chronic kidney disease, without long-term current use of insulin ([facility] [address]    3. Mixed hyperlipidemia    4. Primary osteoarthritis involving multiple joints    5. PAF (paroxysmal atrial fibrillation) (HCC)    6. Stage 2 chronic kidney disease    7. Acquired hypothyroidism    8. Crohn's disease of both small and large intestine without complication ([facility] [address]    9. Alcohol screening    10. Medicare annual wellness visit, subsequent    11. Encounter for immunization      Impression  1. Hypertension that is controlled to continue current therapy reviewed with her. 2.  Diabetes repeat status pending and prior lab reviewed and I will make adjustments if necessary. 3.  Hyperlipidemia prior lab reviewed and repeat status pending and I will adjust if needed. 4. DJD that is stable   5   Paroxysmal atrial fibrillation currently in sinus rhythm. EKG normal sinus rhythm within normal limits  6. CKD stage II repeat status pending  7. Hypothyroidism repeat status pending  8. Crohn's disease currently inactive  9. Annual alcohol screening is done and she drinks about 2 drinks per month. She is cautioned regarding drinking more than 1/day with increased risk of liver disease and other GI effects as well as increased cardiovascular risk particularly in females. Flu shot given today. Medicare subsequent annual wellness examination and screening questionnaire is completed today. The results were reviewed with her and her questions were answered. Lifestyle recommendations and modifications discussed and made. I will call with lab results and make further recommendations or adjustments if necessary.   Follow-up in 3 months or sooner if there is a problem. PLAN:  .  Orders Placed This Encounter    Influenza Vaccine Inactivated (IIV)(FLUAD), Subunit, Adjuvanted, IM, (68115)    CBC WITH AUTOMATED DIFF    METABOLIC PANEL, COMPREHENSIVE ([facility])    LIPID PANEL ([facility])    CK ([facility])    HEMOGLOBIN A1C W/O EAG ([facility])    T4, FREE ([facility])    TSH 3RD GENERATION ([facility])    URINALYSIS W/O MICRO ([facility])    URINE, MICROALBUMIN, SEMIQUANTITATIVE ([facility])    AMB POC EKG ROUTINE W/ 12 LEADS, INTER & REP         ATTENTION:   This medical record was transcribed using an electronic medical records system. Although proofread, it may and can contain electronic and spelling errors. Other human spelling and other errors may be present. Corrections may be executed at a later time. Please feel free to contact us for any clarifications as needed. Follow-up and Dispositions    · Return in about 3 months (around 1/31/2020). Jennifer Lira MD    Recommended healthy diet low in carbohydrates, fats, sodium and cholesterol. Recommended regular cardiovascular exercise 3-6 times per week for 30-60 minutes daily. Current Outpatient Medications   Medication Sig Dispense Refill    hydroCHLOROthiazide (HYDRODIURIL) 25 mg tablet TAKE 1 TABLET EVERY DAY 30 Tab 11    amLODIPine (NORVASC) 2.5 mg tablet TAKE 1 TABLET EVERY DAY 30 Tab 11    nateglinide (STARLIX) 120 mg tablet TAKE 1 TABLET THREE TIMES DAILY BEFORE MEALS 90 Tab 12    diclofenac EC (VOLTAREN) 75 mg EC tablet TAKE 1 TABLET TWICE A DAY WITH FOOD FOR PAIN AND INFLAMMATION 60 Tab 11    levothyroxine (SYNTHROID) 50 mcg tablet TAKE 1 TABLET EVERY DAY 30 Tab 11    GUAIFENESIN/PSEUDOEPHEDRNE HCL (MUCINEX D PO) Take  by mouth as needed.  MULTIVIT,TH IRON,OTHER MIN (COMPLETE MULTIVITAMIN PO) Take 1 Tab by mouth daily.  Indications: Complete multivitamin Women's 50+      calcium-cholecalciferol, D3, (CALTRATE 600+D) tablet Take 1 Tab by mouth daily. No results found for any visits on 10/31/19. Verbal and written instructions (see AVS) provided. Patient expresses understanding of diagnosis and treatment plan.     Rody Kirkland MD

## 2019-10-31 ENCOUNTER — OFFICE VISIT (OUTPATIENT)
Dept: INTERNAL MEDICINE CLINIC | Age: 74
End: 2019-10-31

## 2019-10-31 VITALS
WEIGHT: 184.4 LBS | HEIGHT: 67 IN | HEART RATE: 77 BPM | TEMPERATURE: 98.5 F | OXYGEN SATURATION: 95 % | DIASTOLIC BLOOD PRESSURE: 78 MMHG | SYSTOLIC BLOOD PRESSURE: 134 MMHG | BODY MASS INDEX: 28.94 KG/M2 | RESPIRATION RATE: 18 BRPM

## 2019-10-31 DIAGNOSIS — N18.2 STAGE 2 CHRONIC KIDNEY DISEASE: ICD-10-CM

## 2019-10-31 DIAGNOSIS — Z13.39 ALCOHOL SCREENING: ICD-10-CM

## 2019-10-31 DIAGNOSIS — K50.80 CROHN'S DISEASE OF BOTH SMALL AND LARGE INTESTINE WITHOUT COMPLICATION (HCC): ICD-10-CM

## 2019-10-31 DIAGNOSIS — Z00.00 MEDICARE ANNUAL WELLNESS VISIT, SUBSEQUENT: ICD-10-CM

## 2019-10-31 DIAGNOSIS — I10 ESSENTIAL HYPERTENSION: Primary | ICD-10-CM

## 2019-10-31 DIAGNOSIS — Z23 ENCOUNTER FOR IMMUNIZATION: ICD-10-CM

## 2019-10-31 DIAGNOSIS — E78.2 MIXED HYPERLIPIDEMIA: ICD-10-CM

## 2019-10-31 DIAGNOSIS — E11.22 CONTROLLED TYPE 2 DIABETES MELLITUS WITH STAGE 2 CHRONIC KIDNEY DISEASE, WITHOUT LONG-TERM CURRENT USE OF INSULIN (HCC): ICD-10-CM

## 2019-10-31 DIAGNOSIS — M15.9 PRIMARY OSTEOARTHRITIS INVOLVING MULTIPLE JOINTS: ICD-10-CM

## 2019-10-31 DIAGNOSIS — N39.0 URINARY TRACT INFECTION WITH HEMATURIA, SITE UNSPECIFIED: ICD-10-CM

## 2019-10-31 DIAGNOSIS — R31.9 URINARY TRACT INFECTION WITH HEMATURIA, SITE UNSPECIFIED: ICD-10-CM

## 2019-10-31 DIAGNOSIS — I48.0 PAF (PAROXYSMAL ATRIAL FIBRILLATION) (HCC): ICD-10-CM

## 2019-10-31 DIAGNOSIS — E03.9 ACQUIRED HYPOTHYROIDISM: ICD-10-CM

## 2019-10-31 DIAGNOSIS — N18.2 CONTROLLED TYPE 2 DIABETES MELLITUS WITH STAGE 2 CHRONIC KIDNEY DISEASE, WITHOUT LONG-TERM CURRENT USE OF INSULIN (HCC): ICD-10-CM

## 2019-10-31 LAB
A-G RATIO,AGRAT: 1.4 RATIO
ALBUMIN SERPL-MCNC: 4.3 G/DL (ref 3.9–5.4)
ALP SERPL-CCNC: 88 U/L (ref 38–126)
ALT SERPL-CCNC: 29 U/L (ref 0–35)
ANION GAP SERPL CALC-SCNC: 11 MMOL/L
AST SERPL W P-5'-P-CCNC: 30 U/L (ref 14–36)
BILIRUB SERPL-MCNC: 0.7 MG/DL (ref 0.2–1.3)
BILIRUB UR QL: NEGATIVE
BUN SERPL-MCNC: 18 MG/DL (ref 7–17)
BUN/CREATININE RATIO,BUCR: 26 RATIO
CALCIUM SERPL-MCNC: 9.8 MG/DL (ref 8.4–10.2)
CHLORIDE SERPL-SCNC: 101 MMOL/L (ref 98–107)
CHOL/HDL RATIO,CHHD: 3 RATIO (ref 0–4)
CHOLEST SERPL-MCNC: 205 MG/DL (ref 0–200)
CK SERPL-CCNC: 218 U/L (ref 30–135)
CLARITY: CLEAR
CO2 SERPL-SCNC: 30 MMOL/L (ref 22–32)
COLOR UR: ABNORMAL
CREAT SERPL-MCNC: 0.7 MG/DL (ref 0.7–1.2)
GLOBULIN,GLOB: 3.1
GLUCOSE 24H UR-MRATE: NEGATIVE G/(24.H)
GLUCOSE SERPL-MCNC: 141 MG/DL (ref 65–105)
HDLC SERPL-MCNC: 59 MG/DL (ref 35–130)
HGB UR QL STRIP: ABNORMAL
KETONES UR QL STRIP.AUTO: NEGATIVE
LDL/HDL RATIO,LDHD: 2 RATIO
LDLC SERPL CALC-MCNC: 94 MG/DL (ref 0–130)
LEUKOCYTE ESTERASE: NEGATIVE
MICROALBUMIN, URINE: 20 MG/L (ref 0–20)
NITRITE UR QL STRIP.AUTO: NEGATIVE
PH UR STRIP: 6 [PH] (ref 5–7)
POTASSIUM SERPL-SCNC: 4.2 MMOL/L (ref 3.6–5)
PROT SERPL-MCNC: 7.4 G/DL (ref 6.3–8.2)
PROT UR STRIP-MCNC: NEGATIVE MG/DL
RBC #/AREA URNS HPF: ABNORMAL #/HPF
SODIUM SERPL-SCNC: 142 MMOL/L (ref 137–145)
SP GR UR REFRACTOMETRY: 1.02 (ref 1–1.03)
T4 FREE SERPL-MCNC: 1.06 NG/DL (ref 0.58–2.3)
TRIGL SERPL-MCNC: 258 MG/DL (ref 0–200)
TSH SERPL DL<=0.05 MIU/L-ACNC: 2.64 UIU/ML (ref 0.34–5.6)
UROBILINOGEN UR QL STRIP.AUTO: NEGATIVE
VLDLC SERPL CALC-MCNC: 52 MG/DL
WBC URNS QL MICRO: ABNORMAL #/HPF

## 2019-10-31 NOTE — PROGRESS NOTES
After obtaining consent and per verbal order from Dr. Dillon [facility], patient received influenza vaccine given by Charles Hanna and verified by Marixa Zavaleta. Fluad Influenza 0.5ml was given IM in left deltoid. Patient tolerated injection and was observed for 10 minutes post injection.  VIS was given.'

## 2019-10-31 NOTE — PATIENT INSTRUCTIONS

## 2019-10-31 NOTE — PROGRESS NOTES
Chief Complaint   Patient presents with   [facility] Annual Wellness Visit     Visit Vitals  /78 (BP 1 Location: Left arm, BP Patient Position: Sitting)   Pulse 77   Temp 98.5 °F (36.9 °C) (Oral)   Resp 18   Ht 5' 7\" (1.702 m)   Wt 184 lb 6.4 oz (83.6 kg)   SpO2 95%   BMI 28.88 kg/m²     1. Have you been to the ER, urgent care clinic since your last visit? Hospitalized since your last visit? No    2. Have you seen or consulted any other health care providers outside of the [address] [place] [address] since your last visit? Include any pap smears or colon screening.  No

## 2019-11-01 LAB
BASOPHILS # BLD AUTO: 0.1 X10E3/UL (ref 0–0.2)
BASOPHILS NFR BLD AUTO: 1 %
EOSINOPHIL # BLD AUTO: 1.1 X10E3/UL (ref 0–0.4)
EOSINOPHIL NFR BLD AUTO: 13 %
ERYTHROCYTE [DISTWIDTH] IN BLOOD BY AUTOMATED COUNT: 12.8 % (ref 12.3–15.4)
HBA1C MFR BLD HPLC: 6 % (ref 4–5.7)
HCT VFR BLD AUTO: 37.8 % (ref 34–46.6)
HGB BLD-MCNC: 13.5 G/DL (ref 11.1–15.9)
IMM GRANULOCYTES # BLD AUTO: 0 X10E3/UL (ref 0–0.1)
IMM GRANULOCYTES NFR BLD AUTO: 0 %
LYMPHOCYTES # BLD AUTO: 2.8 X10E3/UL (ref 0.7–3.1)
LYMPHOCYTES NFR BLD AUTO: 34 %
MCH RBC QN AUTO: 32.2 PG (ref 26.6–33)
MCHC RBC AUTO-ENTMCNC: 35.7 G/DL (ref 31.5–35.7)
MCV RBC AUTO: 90 FL (ref 79–97)
MONOCYTES # BLD AUTO: 0.5 X10E3/UL (ref 0.1–0.9)
MONOCYTES NFR BLD AUTO: 7 %
NEUTROPHILS # BLD AUTO: 3.7 X10E3/UL (ref 1.4–7)
NEUTROPHILS NFR BLD AUTO: 45 %
PLATELET # BLD AUTO: 285 X10E3/UL (ref 150–450)
RBC # BLD AUTO: 4.19 X10E6/UL (ref 3.77–5.28)
WBC # BLD AUTO: 8.2 X10E3/UL (ref 3.4–10.8)

## 2019-11-02 LAB — BACTERIA UR CULT: NORMAL

## 2020-01-30 NOTE — PROGRESS NOTES
Chief Complaint   Patient presents with    Hypertension     3 month follow up    Diabetes     3 month follow up    Thyroid Problem     3 month follow up       SUBJECTIVE:    Mateusz Burgess is a 69 y.o. female who returns in follow-up for medical problems include hypertension, diabetes, hyperlipidemia, Crohn's disease with history of small bowel obstruction, paroxysmal atrial fibrillation, DJD, CKD stage II and other medical problems. She is taking her medications and trying to follow her diet and remain physically active. She currently denies any chest pain, shortness of breath, palpitations, PND, orthopnea or other cardiorespiratory complaints. She notes no GI or  complaints. She notes no headaches, dizziness or neurologic complaints. She has no current arthritic complaints and there are no other complaints on complete review of systems. Current Outpatient Medications   Medication Sig Dispense Refill    hydroCHLOROthiazide (HYDRODIURIL) 25 mg tablet TAKE 1 TABLET EVERY DAY 30 Tab 11    amLODIPine (NORVASC) 2.5 mg tablet TAKE 1 TABLET EVERY DAY 30 Tab 11    nateglinide (STARLIX) 120 mg tablet TAKE 1 TABLET THREE TIMES DAILY BEFORE MEALS 90 Tab 12    diclofenac EC (VOLTAREN) 75 mg EC tablet TAKE 1 TABLET TWICE A DAY WITH FOOD FOR PAIN AND INFLAMMATION 60 Tab 11    levothyroxine (SYNTHROID) 50 mcg tablet TAKE 1 TABLET EVERY DAY 30 Tab 11    GUAIFENESIN/PSEUDOEPHEDRNE HCL (MUCINEX D PO) Take  by mouth as needed.  MULTIVIT,TH IRON,OTHER MIN (COMPLETE MULTIVITAMIN PO) Take 1 Tab by mouth daily. Indications: Complete multivitamin Women's 50+      calcium-cholecalciferol, D3, (CALTRATE 600+D) tablet Take 1 Tab by mouth daily.        Past Medical History:   Diagnosis Date    Acquired hypothyroidism 7/26/2017    CKD (chronic kidney disease) 7/26/2017    [facility] [address]     Diabetes ([facility] [address]     Hypertension     On statin therapy 7/26/2017    Pleurisy 07/17/2019    Post-menopausal 2017     Past Surgical History:   Procedure Laterality Date    BREAST SURGERY PROCEDURE UNLISTED      cyst removed right    COLONOSCOPY N/A 2018    [facility] by Contreras V[facility] ENDOSCOPY    COLONOSCOPY,DIAGNOSTIC  2018         HX BREAST BIOPSY Right     over 30 years  negative    HX GI      tumor from colon removed    HX ORTHOPAEDIC      right wrist    HX OTHER SURGICAL      Bowel Obstruction    DE COLONOSCOPY W/BIOPSY SINGLE/MULTIPLE  2014          Allergies   Allergen Reactions    Lisinopril Cough       REVIEW OF SYSTEMS:  General: negative for - chills or fever, or weight loss or gain  ENT: negative for - headaches, nasal congestion or tinnitus  Eyes: no blurred or visual changes  Neck: No stiffness or swollen nodes  Respiratory: negative for - cough, hemoptysis, shortness of breath or wheezing  Cardiovascular : negative for - chest pain, edema, palpitations or shortness of breath  Gastrointestinal: negative for - abdominal pain, blood in stools, heartburn or nausea/vomiting  Genito-Urinary: no dysuria, trouble voiding, or hematuria  Musculoskeletal: negative for - gait disturbance, joint pain, joint stiffness or joint swelling  Neurological: no TIA or stroke symptoms  Hematologic: no bruises, no bleeding  Lymphatic: no swollen glands  Integument: no lumps, mole changes, nail changes or rash  Endocrine:no malaise/lethargy poly uria or polydipsia or unexpected weight changes        Social History     Socioeconomic History    Marital status:      Spouse name: Not on file    Number of children: Not on file    Years of education: Not on file    Highest education level: Not on file   Tobacco Use    Smoking status: Former Smoker     Packs/day: 1.00     Years: 25.00     Pack years: 25.00     Last attempt to quit: 1979     Years since quittin.1    Smokeless tobacco: Never Used   Substance and Sexual Activity    Alcohol use: Yes     Comment: 2 drinks per month     Drug use: No    Sexual activity: Never     Family History   Problem Relation Age of Onset    Diabetes Mother     Heart Failure Mother     Cancer Father         stomach       OBJECTIVE:     Visit Vitals  /78 (BP 1 Location: Right arm, BP Patient Position: Sitting)   Pulse 82   Temp 97.7 °F (36.5 °C) (Oral)   Resp 16   Ht 5' 7\" (1.702 m)   Wt 187 lb 12.8 oz (85.2 kg)   SpO2 97%   BMI 29.41 kg/m²     CONSTITUTIONAL:   well nourished, appears age appropriate  EYES: sclera anicteric, PERRL, EOMI  ENMT:nares clear, moist mucous membranes, pharynx clear  NECK: supple. Thyroid normal, No JVD or bruits  RESPIRATORY: Chest: clear to ascultation and percussion, normal inspiratory effort  CARDIOVASCULAR: Heart: regular rate and rhythm no murmurs, rubs or gallops, PMI not displaced, No thrills  GASTROINTESTINAL: Abdomen: non distended, soft, non tender, bowel sounds normal  HEMATOLOGIC: no purpura, petechiae or bruising  LYMPHATIC: No lymph node enlargemant  MUSCULOSKELETAL: Extremities: no edema or active synovitis, pulse 1+. No diabetic foot changes noted. INTEGUMENT: No unusual rashes or suspicious skin lesions noted. Nails appear normal.  PERIPHERAL VASCULAR: normal pulses femoral, PT and DP  NEUROLOGIC: non-focal exam, A & O X 3. Normal distal sensation and proprioception all toes both feet. PSYCHIATRIC:, appropriate affect     ASSESSMENT:   1. Essential hypertension    2. Controlled type 2 diabetes mellitus with stage 2 chronic kidney disease, without long-term current use of insulin ([address]    3. Mixed hyperlipidemia    4. PAF (paroxysmal atrial fibrillation) ([address]    5. Primary osteoarthritis involving multiple joints    6. Stage 2 chronic kidney disease    7. SBO (small bowel obstruction) ([address]    8. Crohn's disease of both small and large intestine without complication ([address]      Impression  1. Hypertension that is controlled so continue current therapy reviewed with her.   2. Diabetes repeat status pending a prior lab review not make adjustments if necessary. 3.  Hyperlipidemia prior lab reviewed and repeat status pending I will adjust if needed. 4.  Paroxysmal atrial fibrillation that is stable  5. DJD currently stable  6. CKD stage II repeat status pending  7. History of small bowel obstruction most recent symptoms  8. Crohn's disease currently under control  I will recheck her in 3 months or sooner should to be a problem. I will call with lab results in interim. PLAN:  .  Orders Placed This Encounter    METABOLIC PANEL, COMPREHENSIVE ([facility])    LIPID PANEL ([facility])    CK ([facility])    HEMOGLOBIN A1C W/O EAG ([facility])         ATTENTION:   This medical record was transcribed using an electronic medical records system. Although proofread, it may and can contain electronic and spelling errors. Other human spelling and other errors may be present. Corrections may be executed at a later time. Please feel free to contact us for any clarifications as needed. Follow-up and Dispositions    · Return in about 3 months (around 4/30/2020). No results found for any visits on 01/31/20. Johnathan Kwok MD    The patient verbalized understanding of the problems and plans as explained.

## 2020-01-31 ENCOUNTER — OFFICE VISIT (OUTPATIENT)
Dept: INTERNAL MEDICINE CLINIC | Age: 75
End: 2020-01-31

## 2020-01-31 VITALS
WEIGHT: 187.8 LBS | TEMPERATURE: 97.7 F | OXYGEN SATURATION: 97 % | SYSTOLIC BLOOD PRESSURE: 126 MMHG | BODY MASS INDEX: 29.47 KG/M2 | RESPIRATION RATE: 16 BRPM | HEART RATE: 82 BPM | HEIGHT: 67 IN | DIASTOLIC BLOOD PRESSURE: 78 MMHG

## 2020-01-31 DIAGNOSIS — I10 ESSENTIAL HYPERTENSION: Primary | ICD-10-CM

## 2020-01-31 DIAGNOSIS — N18.2 CONTROLLED TYPE 2 DIABETES MELLITUS WITH STAGE 2 CHRONIC KIDNEY DISEASE, WITHOUT LONG-TERM CURRENT USE OF INSULIN (HCC): ICD-10-CM

## 2020-01-31 DIAGNOSIS — K50.80 CROHN'S DISEASE OF BOTH SMALL AND LARGE INTESTINE WITHOUT COMPLICATION (HCC): ICD-10-CM

## 2020-01-31 DIAGNOSIS — E78.2 MIXED HYPERLIPIDEMIA: ICD-10-CM

## 2020-01-31 DIAGNOSIS — K56.609 SBO (SMALL BOWEL OBSTRUCTION) (HCC): ICD-10-CM

## 2020-01-31 DIAGNOSIS — I48.0 PAF (PAROXYSMAL ATRIAL FIBRILLATION) (HCC): ICD-10-CM

## 2020-01-31 DIAGNOSIS — N18.2 STAGE 2 CHRONIC KIDNEY DISEASE: ICD-10-CM

## 2020-01-31 DIAGNOSIS — E11.22 CONTROLLED TYPE 2 DIABETES MELLITUS WITH STAGE 2 CHRONIC KIDNEY DISEASE, WITHOUT LONG-TERM CURRENT USE OF INSULIN (HCC): ICD-10-CM

## 2020-01-31 DIAGNOSIS — M15.9 PRIMARY OSTEOARTHRITIS INVOLVING MULTIPLE JOINTS: ICD-10-CM

## 2020-01-31 LAB
A-G RATIO,AGRAT: 1.4 RATIO
ALBUMIN SERPL-MCNC: 4.3 G/DL (ref 3.9–5.4)
ALP SERPL-CCNC: 83 U/L (ref 38–126)
ALT SERPL-CCNC: 27 U/L (ref 0–35)
ANION GAP SERPL CALC-SCNC: 12 MMOL/L
AST SERPL W P-5'-P-CCNC: 31 U/L (ref 14–36)
BILIRUB SERPL-MCNC: 0.6 MG/DL (ref 0.2–1.3)
BUN SERPL-MCNC: 18 MG/DL (ref 7–17)
BUN/CREATININE RATIO,BUCR: 26 RATIO
CALCIUM SERPL-MCNC: 9.6 MG/DL (ref 8.4–10.2)
CHLORIDE SERPL-SCNC: 99 MMOL/L (ref 98–107)
CHOL/HDL RATIO,CHHD: 3 RATIO (ref 0–4)
CHOLEST SERPL-MCNC: 201 MG/DL (ref 0–200)
CK SERPL-CCNC: 239 U/L (ref 30–135)
CO2 SERPL-SCNC: 30 MMOL/L (ref 22–32)
CREAT SERPL-MCNC: 0.7 MG/DL (ref 0.7–1.2)
GLOBULIN,GLOB: 3
GLUCOSE SERPL-MCNC: 138 MG/DL (ref 65–105)
HBA1C MFR BLD HPLC: 5.9 % (ref 4–5.7)
HDLC SERPL-MCNC: 60 MG/DL (ref 35–130)
LDL/HDL RATIO,LDHD: 2 RATIO
LDLC SERPL CALC-MCNC: 100 MG/DL (ref 0–130)
POTASSIUM SERPL-SCNC: 4.1 MMOL/L (ref 3.6–5)
PROT SERPL-MCNC: 7.3 G/DL (ref 6.3–8.2)
SODIUM SERPL-SCNC: 141 MMOL/L (ref 137–145)
TRIGL SERPL-MCNC: 205 MG/DL (ref 0–200)
VLDLC SERPL CALC-MCNC: 41 MG/DL

## 2020-01-31 NOTE — PATIENT INSTRUCTIONS

## 2020-01-31 NOTE — PROGRESS NOTES
Melissa Mckeon is a 69 y.o. female     Chief Complaint   Patient presents with    Hypertension     3 month follow up    Diabetes     3 month follow up    Thyroid Problem     3 month follow up       Visit Vitals  /78 (BP 1 Location: Right arm, BP Patient Position: Sitting)   Pulse 82   Temp 97.7 °F (36.5 °C) (Oral)   Resp 16   Ht 5' 7\" (1.702 m)   Wt 187 lb 12.8 oz (85.2 kg)   SpO2 97%   BMI 29.41 kg/m²       Health Maintenance Due   Topic Date Due    DTaP/Tdap/Td series (1 - Tdap) 03/07/1956    Shingrix Vaccine Age 50> (1 of 2) 03/07/1996    GLAUCOMA SCREENING Q2Y  06/22/2019    EYE EXAM RETINAL OR DILATED  06/22/2019    FOOT EXAM Q1  01/02/2020       1. Have you been to the ER, urgent care clinic since your last visit? Hospitalized since your last visit? No    2. Have you seen or consulted any other health care providers outside of the [address] [place] [address] since your last visit? Include any pap smears or colon screening.  No

## 2020-02-12 ENCOUNTER — OFFICE VISIT (OUTPATIENT)
Dept: INTERNAL MEDICINE CLINIC | Age: 75
End: 2020-02-12

## 2020-02-12 VITALS
TEMPERATURE: 98.6 F | DIASTOLIC BLOOD PRESSURE: 88 MMHG | BODY MASS INDEX: 29.65 KG/M2 | RESPIRATION RATE: 19 BRPM | WEIGHT: 188.9 LBS | SYSTOLIC BLOOD PRESSURE: 138 MMHG | OXYGEN SATURATION: 96 % | HEIGHT: 67 IN | HEART RATE: 83 BPM

## 2020-02-12 DIAGNOSIS — R09.1 PLEURISY: ICD-10-CM

## 2020-02-12 DIAGNOSIS — R07.1 CHEST PAIN ON BREATHING: Primary | ICD-10-CM

## 2020-02-12 PROBLEM — Z79.899 ON STATIN THERAPY: Status: RESOLVED | Noted: 2017-07-26 | Resolved: 2020-02-12

## 2020-02-12 RX ORDER — PREDNISONE 5 MG/1
TABLET ORAL
Qty: 48 TAB | Refills: 0 | Status: SHIPPED | OUTPATIENT
Start: 2020-02-12 | End: 2020-03-09 | Stop reason: ALTCHOICE

## 2020-02-12 NOTE — PROGRESS NOTES
Subjective:   Brandon Calderon is a 69 y.o. female      Chief Complaint   Patient presents with    Back Pain     x 4 days    Breathing Problem     pt states it hurts to breathe    Cough        History of present illness: She presents complaint of pain in her left lower chest/back area for the past 4 days. It does hurt to take up deep breath. She has had a cough. There is no sputum production. She notes no fevers or chills. There is no increased shortness of breath.     Patient Active Problem List   Diagnosis Code    SBO (small bowel obstruction) ([facility] K56.609    Crohn disease ([facility] K50.90    Essential hypertension I10    Controlled type 2 diabetes mellitus with stage 2 chronic kidney disease, without long-term current use of insulin ([facility]) E11.22, N18.2    Mixed hyperlipidemia E78.2    Primary osteoarthritis involving multiple joints M15.0    PAF (paroxysmal atrial fibrillation) ([facility]) I48.0    Post-menopausal Z78.0    Acquired hypothyroidism E03.9    Stage 2 chronic kidney disease N18.2    Medicare annual wellness visit, subsequent Z00.00    Colon cancer screening Z12.11    Acute bronchitis J20.9    Acute non-recurrent maxillary sinusitis J01.00    Chest pain on breathing R07.1    Pleurisy R09.1    Alcohol screening Z13.39      Past Medical History:   Diagnosis Date    Acquired hypothyroidism 7/26/2017    CKD (chronic kidney disease) 7/26/2017    Crohn's disease ([facility]     Diabetes ([facility]     Hypertension     On statin therapy 7/26/2017    Pleurisy 07/17/2019    Post-menopausal 7/26/2017      Allergies   Allergen Reactions    Lisinopril Cough      Family History   Problem Relation Age of Onset    Diabetes Mother     Heart Failure Mother     Cancer Father         stomach      Social History     Socioeconomic History    Marital status:      Spouse name: Not on file    Number of children: Not on file    Years of education: Not on file    Highest education level: Not on file Occupational History    Not on file   Social Needs    Financial resource strain: Not on file    Food insecurity:     Worry: Not on file     Inability: Not on file    Transportation needs:     Medical: Not on file     Non-medical: Not on file   Tobacco Use    Smoking status: Former Smoker     Packs/day: 1.00     Years: 25.00     Pack years: 25.00     Last attempt to quit:      Years since quittin.1    Smokeless tobacco: Never Used   Substance and Sexual Activity    Alcohol use: Yes     Comment: 2 drinks per month     Drug use: No    Sexual activity: Never   Lifestyle    Physical activity:     Days per week: Not on file     Minutes per session: Not on file    Stress: Not on file   Relationships    Social connections:     Talks on phone: Not on file     Gets together: Not on file     Attends Protestant service: Not on file     Active member of club or organization: Not on file     Attends meetings of clubs or organizations: Not on file     Relationship status: Not on file    Intimate partner violence:     Fear of current or ex partner: Not on file     Emotionally abused: Not on file     Physically abused: Not on file     Forced sexual activity: Not on file   Other Topics Concern    Not on file   Social History Narrative    Not on file     Prior to Admission medications    Medication Sig Start Date End Date Taking?  Authorizing Provider   predniSONE (STERAPRED) 5 mg dose pack See administration instruction per 5mg dose pack 20  Yes Penny Barragan MD   hydroCHLOROthiazide (HYDRODIURIL) 25 mg tablet TAKE 1 TABLET EVERY DAY 10/29/19  Yes Penny Barragan MD   amLODIPine (NORVASC) 2.5 mg tablet TAKE 1 TABLET EVERY DAY 19  Yes Penny Barragan MD   nateglinide (STARLIX) 120 mg tablet TAKE 1 TABLET THREE TIMES DAILY BEFORE MEALS 19  Yes Penny Barragan MD   diclofenac EC (VOLTAREN) 75 mg EC tablet TAKE 1 TABLET TWICE A DAY WITH FOOD FOR PAIN AND INFLAMMATION 19  Yes Aleks Caraballo MD   levothyroxine (SYNTHROID) 50 mcg tablet TAKE 1 TABLET EVERY DAY 2/28/19  Yes Aleks Caraballo MD   GUAIFENESIN/PSEUDOEPHEDRNE HCL [facility] D PO) Take  by mouth as needed. Yes Provider, Historical   MULTIVIT,TH IRON,OTHER MIN (COMPLETE MULTIVITAMIN PO) Take 1 Tab by mouth daily. Indications: Complete multivitamin Women's 50+   Yes Other, MD Cordelia   calcium-cholecalciferol, D3, (CALTRATE 600+D) tablet Take 1 Tab by mouth daily. Yes Other, MD Cordelia        Review of Systems              Constitutional:  She denies fever, weight loss, sweats or fatigue. EYES: No blurred or double vision,               ENT: no nasal congestion, no headache or dizziness. No difficulty with               swallowing, taste, speech or smell. Respiratory:  No cough and pain left lobe posterior chest wall/chest without wheezing or shortness of breath. No sputum production. Cardiac:  Denies chest pain, palpitations, unexplained indigestion, syncope, edema, PND or orthopnea. GI:  No changes in bowel movements, no abdominal pain, no bloating, anorexia, nausea, vomiting or heartburn. :  No frequency or dysuria. Denies incontinence or sexual dysfunction. Extremities:  No joint pain, stiffness or swelling  Back:.no pain or soreness  Skin:  No recent rashes or mole changes. Neurological:  No numbness, tingling, burning paresthesias or loss of motor strength. No syncope, dizziness, frequent headaches or memory loss. Hematologic:  No easy bruising  Lymphatic: No lymph node enlargement    Objective:     Vitals:    02/12/20 1524 02/12/20 1535   BP: 142/88 138/88   Pulse: 83    Resp: 19    Temp: 98.6 °F (37 °C)    TempSrc: Oral    SpO2: 96%    Weight: 188 lb 14.4 oz (85.7 kg)    Height: 5' 7\" (1.702 m)    PainSc:   8    PainLoc: Back        Body mass index is 29.59 kg/m². Physical Examination:              General Appearance:  Well-developed, well-nourished, no acute distress.              HEENT: Ears:  The TMs and ear canals were clear. Eyes:  The pupillary responses were normal.  Extraocular muscle function intact. Lids and conjunctiva not injected. Funduscopic exam revealed sharp disc margins. Nares: Clear w/o edema or erythema  Pharynx:  Clear with teeth in good repair. No masses were noted. Neck:  Supple without thyromegaly or adenopathy. No JVD noted. No carotid                bruits. Lungs:  Clear to auscultation and percussion except rales left base. Cardiac:  Regular rate and rhythm without murmur. PMI not displaced. No gallop, rub or click. Abdominal: Soft, non-tender, no hepata-spleenomegally or masses  Extremities:  No clubbing, cyanosis or edema. Skin:  No rash or unusual mole changes noted. Lymph Nodes:  None felt in the cervical, supraclavicular, axillary or inguinal region. Neurological: . DTRs 2+ and symmetric. Station and gait normal.   Hematologic:   No purpura or petechiae        Assessment/Plan:         1. Chest pain on breathing    2. Pleurisy        Impressions/Plan:  Pression  1. Chest pain on breathing chest x-ray obtained today because of rales but that shows only some scarring at the left base but no acute process. I think were dealing pleurisy so we will treat with prednisone Dosepak  Recheck if not resolved. Orders Placed This Encounter    XR CHEST PA LAT    predniSONE (STERAPRED) 5 mg dose pack       Follow-up and Dispositions    · Return TBD. No results found for any visits on 02/12/20. Caitlin Reyes MD    The patient was given after the visit summary the patient verbalized an understanding of the plans and problems as explained.

## 2020-02-12 NOTE — PATIENT INSTRUCTIONS
Pleurisy: Care Instructions Your Care Instructions Pleurisy is inflammation of the tissue that lines the inside of the chest and covers the lungs (pleura). Pleurisy is often caused by an infection, usually a virus. It also can be caused by other health problems, such as pneumonia or lupus. Pleurisy can cause sharp chest pain that gets worse when you cough or take a deep breath. You may need more tests to find out what is causing your pleurisy. Treatment depends on the cause. Pleurisy may come and go for a few days, or it may continue if the cause has not been treated. Home treatment can help ease symptoms. Follow-up care is a key part of your treatment and safety. Be sure to make and go to all appointments, and call your doctor if you are having problems. It's also a good idea to know your test results and keep a list of the medicines you take. How can you care for yourself at home? · Take an over-the-counter pain medicine, such as acetaminophen (Tylenol), ibuprofen (Advil, Motrin), or naproxen (Aleve). Read and follow all instructions on the label. · Do not take two or more pain medicines at the same time unless the doctor told you to. Many pain medicines have acetaminophen, which is Tylenol. Too much acetaminophen (Tylenol) can be harmful. · If your doctor prescribed antibiotics, take them as directed. Do not stop taking them just because you feel better. You need to take the full course of antibiotics. · Take cough medicine as directed if your doctor recommends it. · Avoid activities that make the pain worse. When should you call for help? Call 911 anytime you think you may need emergency care.  For example, call if: 
  · You have severe trouble breathing.  
  · You have severe chest pain.  
  · You passed out (lost consciousness).  
 Call your doctor now or seek immediate medical care if: 
  · You have a new or higher fever.  
 Watch closely for changes in your health, and be sure to contact your doctor if: 
  · You begin to cough up yellow or green mucus.  
  · You cough up blood.  
  · Your symptoms are not better in 3 or 4 days. Where can you learn more? Go to http://michel-brittny.info/. Enter F346 in the search box to learn more about \"Pleurisy: Care Instructions. \" Current as of: June 9, 2019 Content Version: 12.2 © 5559-3566 [employer]. Care instructions adapted under license by [employer] (which disclaims liability or warranty for this information). If you have questions about a medical condition or this instruction, always ask your healthcare professional. [address] any warranty or liability for your use of this information.

## 2020-02-12 NOTE — PROGRESS NOTES
Chief Complaint   Patient presents with    Back Pain     x 4 days    Breathing Problem     pt states it hurts to breathe    Cough     Visit Vitals  /88 (BP 1 Location: Left arm, BP Patient Position: Sitting)   Pulse 83   Temp 98.6 °F (37 °C) (Oral)   Resp 19   Ht 5' 7\" (1.702 m)   Wt 188 lb 14.4 oz (85.7 kg)   SpO2 96%   BMI 29.59 kg/m²     1. Have you been to the ER, urgent care clinic since your last visit? Hospitalized since your last visit? No    2. Have you seen or consulted any other health care providers outside of the [address] [place] [address] since your last visit? Include any pap smears or colon screening.  No

## 2020-02-24 DIAGNOSIS — E03.9 ACQUIRED HYPOTHYROIDISM: ICD-10-CM

## 2020-02-24 RX ORDER — LEVOTHYROXINE SODIUM 50 UG/1
TABLET ORAL
Qty: 30 TAB | Refills: 11 | Status: SHIPPED | OUTPATIENT
Start: 2020-02-24 | End: 2021-03-18

## 2020-02-24 NOTE — TELEPHONE ENCOUNTER
RX refill request from the patient/pharmacy. Patient last seen 02- with labs, and next appt. scheduled for 05-  Requested Prescriptions     Pending Prescriptions Disp Refills    levothyroxine (SYNTHROID) 50 mcg tablet [Pharmacy Med Name: *LEVOTHYROXIN 50MCG] 30 Tab 11     Sig: TAKE 1 TABLET EVERY DAY   .

## 2020-03-09 ENCOUNTER — HOSPITAL ENCOUNTER (OUTPATIENT)
Dept: ULTRASOUND IMAGING | Age: 75
Discharge: HOME | End: 2020-03-09
Attending: INTERNAL MEDICINE
Payer: MEDICARE

## 2020-03-09 ENCOUNTER — OFFICE VISIT (OUTPATIENT)
Dept: INTERNAL MEDICINE CLINIC | Age: 75
End: 2020-03-09

## 2020-03-09 VITALS
RESPIRATION RATE: 18 BRPM | OXYGEN SATURATION: 94 % | HEIGHT: 67 IN | SYSTOLIC BLOOD PRESSURE: 136 MMHG | HEART RATE: 95 BPM | WEIGHT: 189 LBS | DIASTOLIC BLOOD PRESSURE: 80 MMHG | BODY MASS INDEX: 29.66 KG/M2

## 2020-03-09 DIAGNOSIS — R60.9 EDEMA, UNSPECIFIED TYPE: Primary | ICD-10-CM

## 2020-03-09 DIAGNOSIS — R60.9 EDEMA, UNSPECIFIED TYPE: ICD-10-CM

## 2020-03-09 PROCEDURE — 93971 EXTREMITY STUDY: CPT

## 2020-03-09 RX ORDER — FUROSEMIDE 40 MG/1
TABLET ORAL
COMMUNITY
Start: 2020-03-05 | End: 2020-05-04 | Stop reason: ALTCHOICE

## 2020-03-09 NOTE — PROGRESS NOTES
Chief Complaint   Patient presents with    Leg Swelling     left leg swelling x 1 week     Visit Vitals  /80 (BP 1 Location: Left arm, BP Patient Position: Sitting)   Pulse 95   Resp 18   Ht 5' 7\" (1.702 m)   Wt 189 lb (85.7 kg)   SpO2 94%   BMI 29.60 kg/m²     1. Have you been to the ER, urgent care clinic since your last visit? Hospitalized since your last visit? [facility] 3/4/20 for leg swelling    2. Have you seen or consulted any other health care providers outside of the [address] [place] [address] since your last visit? Include any pap smears or colon screening.  No

## 2020-03-09 NOTE — PROGRESS NOTES
Subjective:   Iani Shelley is a 77 y.o. female      Chief Complaint   Patient presents with    Leg Swelling     left leg swelling x 1 week        History of present illness: She presents complaints of swelling of her left leg that started about 8 days ago. 6 days ago she went to a \"doc in the box \"and had a ultrasound done there at their office and they told her it was not a blood clot. They placed on Lasix which has not helped any. She notes no swelling in the right leg and all seem to be in the left leg. There is no history of trauma. She has had no pain. She has been on no long trips and nothing else has changed that would have subjected her to a blood clot. She notes no shortness of breath, chest pain or other cardiovascular complaints outside of that unilateral lower extremity edema.     Patient Active Problem List   Diagnosis Code    SBO (small bowel obstruction) ([facility] K56.609    [facility] K50.90    Essential hypertension I10    Controlled type 2 diabetes mellitus with stage 2 chronic kidney disease, without long-term current use of insulin ([facility]) E11.22, N18.2    Mixed hyperlipidemia E78.2    Primary osteoarthritis involving multiple joints M15.0    PAF (paroxysmal atrial fibrillation) ([facility]) I48.0    Post-menopausal Z78.0    Acquired hypothyroidism E03.9    Stage 2 chronic kidney disease N18.2    Medicare annual wellness visit, subsequent Z00.00    Colon cancer screening Z12.11    Acute bronchitis J20.9    Acute non-recurrent maxillary sinusitis J01.00    Chest pain on breathing R07.1    Pleurisy R09.1    Alcohol screening Z13.39    Edema R60.9      Past Medical History:   Diagnosis Date    Acquired hypothyroidism 7/26/2017    CKD (chronic kidney disease) 7/26/2017    Crohn's disease ([facility]     Diabetes ([facility]     Hypertension     On statin therapy 7/26/2017    Pleurisy 07/17/2019    Post-menopausal 7/26/2017      Allergies   Allergen Reactions    Lisinopril Cough      Family History   Problem Relation Age of Onset    Diabetes Mother     Heart Failure Mother     Cancer Father         stomach      Social History     Socioeconomic History    Marital status:      Spouse name: Not on file    Number of children: Not on file    Years of education: Not on file    Highest education level: Not on file   Occupational History    Not on file   Social Needs    Financial resource strain: Not on file    Food insecurity:     Worry: Not on file     Inability: Not on file    Transportation needs:     Medical: Not on file     Non-medical: Not on file   Tobacco Use    Smoking status: Former Smoker     Packs/day: 1.00     Years: 25.00     Pack years: 25.00     Last attempt to quit:      Years since quittin.2    Smokeless tobacco: Never Used   Substance and Sexual Activity    Alcohol use: Yes     Comment: 2 drinks per month     Drug use: No    Sexual activity: Never   Lifestyle    Physical activity:     Days per week: Not on file     Minutes per session: Not on file    Stress: Not on file   Relationships    Social connections:     Talks on phone: Not on file     Gets together: Not on file     Attends Latter-day service: Not on file     Active member of club or organization: Not on file     Attends meetings of clubs or organizations: Not on file     Relationship status: Not on file    Intimate partner violence:     Fear of current or ex partner: Not on file     Emotionally abused: Not on file     Physically abused: Not on file     Forced sexual activity: Not on file   Other Topics Concern    Not on file   Social History Narrative    Not on file     Prior to Admission medications    Medication Sig Start Date End Date Taking?  Authorizing Provider   furosemide (LASIX) 40 mg tablet TAKE 1 TABLET BY MOUTH DAILY FOR SWELLING 3/5/20  Yes Provider, Historical   levothyroxine (SYNTHROID) 50 mcg tablet TAKE 1 TABLET EVERY DAY 20  Yes Mykel Chanel MD hydroCHLOROthiazide (HYDRODIURIL) 25 mg tablet TAKE 1 TABLET EVERY DAY 10/29/19  Yes Lindalee Angelucci, MD   amLODIPine (NORVASC) 2.5 mg tablet TAKE 1 TABLET EVERY DAY 9/23/19  Yes Lindalee Angelucci, MD   nateglinide (STARLIX) 120 mg tablet TAKE 1 TABLET THREE TIMES DAILY BEFORE MEALS 6/18/19  Yes Lindalee Angelucci, MD   diclofenac EC (VOLTAREN) 75 mg EC tablet TAKE 1 TABLET TWICE A DAY WITH FOOD FOR PAIN AND INFLAMMATION 4/22/19  Yes Lindalee Angelucci, MD   GUAIFENESIN/PSEUDOEPHEDRNE HCL [facility] D PO) Take  by mouth as needed. Yes Provider, Historical   MULTIVIT,TH IRON,OTHER MIN (COMPLETE MULTIVITAMIN PO) Take 1 Tab by mouth daily. Indications: Complete multivitamin Women's 50+   Yes Bernice, MD Cordelia   calcium-cholecalciferol, D3, (CALTRATE 600+D) tablet Take 1 Tab by mouth daily. Yes Other, MD Cordelia        Review of Systems              Constitutional:  She denies fever, weight loss, sweats or fatigue. EYES: No blurred or double vision,               ENT: no nasal congestion, no headache or dizziness. No difficulty with               swallowing, taste, speech or smell. Respiratory:  No cough, wheezing or shortness of breath. No sputum production. Cardiac:  Denies chest pain, palpitations, unexplained indigestion, syncope, edema, PND or orthopnea. GI:  No changes in bowel movements, no abdominal pain, no bloating, anorexia, nausea, vomiting or heartburn. :  No frequency or dysuria. Denies incontinence or sexual dysfunction. Extremities:  No joint pain, stiffness or swelling except left leg is noted  Back:.no pain or soreness  Skin:  No recent rashes or mole changes. Neurological:  No numbness, tingling, burning paresthesias or loss of motor strength. No syncope, dizziness, frequent headaches or memory loss.   Hematologic:  No easy bruising  Lymphatic: No lymph node enlargement    Objective:     Vitals:    03/09/20 1619   BP: 136/80   Pulse: 95   Resp: 18   SpO2: 94%   Weight: 189 lb (85.7 kg)   Height: 5' 7\" (1.702 m)   PainSc:   0 - No pain       Body mass index is 29.6 kg/m². Physical Examination:              General Appearance:  Well-developed, well-nourished, no acute distress. HEENT:      Ears:  The TMs and ear canals were clear. Eyes:  The pupillary responses were normal.  Extraocular muscle function intact. Lids and conjunctiva not injected. Funduscopic exam revealed sharp disc margins. Nares: Clear w/o edema or erythema  Pharynx:  Clear with teeth in good repair. No masses were noted. Neck:  Supple without thyromegaly or adenopathy. No JVD noted. No carotid                bruits. Lungs:  Clear to auscultation and percussion. Cardiac:  Regular rate and rhythm without murmur. PMI not displaced. No gallop, rub or click. Abdominal: Soft, non-tender, no hepata-spleenomegally or masses  Extremities:  No clubbing, cyanosis or edema. Except left leg with significant edema no venous cords  Skin:  No rash or unusual mole changes noted. Lymph Nodes:  None felt in the cervical, supraclavicular, axillary or inguinal region. Neurological: . DTRs 2+ and symmetric. Station and gait normal.   Hematologic:   No purpura or petechiae        Assessment/Plan:         1. Edema, unspecified type        Impressions/Plan:  Impression  1. Unilateral leg edema certainly concerning for DVT even despite the urgent care negative ultrasound and will schedule venous Dopplers. We will keep her on the Lasix placed at the urgent care but I do not think that is the etiology and says no edema of the right lower extremity. Further follow-up to be determined based upon the Doppler test    Orders Placed This Encounter    furosemide (LASIX) 40 mg tablet       Follow-up and Dispositions    · Return TBD. No results found for any visits on 03/09/20.       Rose Mary Fall MD    The patient was given after the visit summary the patient verbalized an understanding of the plans and problems as explained.

## 2020-03-09 NOTE — PATIENT INSTRUCTIONS

## 2020-03-10 NOTE — PROGRESS NOTES
No DVT so elevation with warm compresses and the painful anti-inflammatory meloxicam.   Discussed with patient.

## 2020-04-22 DIAGNOSIS — M13.0 ARTHRITIS OF MULTIPLE SITES: ICD-10-CM

## 2020-04-22 RX ORDER — DICLOFENAC SODIUM 75 MG/1
TABLET, DELAYED RELEASE ORAL
Qty: 60 TAB | Refills: 10 | Status: SHIPPED | OUTPATIENT
Start: 2020-04-22 | End: 2021-06-14

## 2020-04-22 NOTE — TELEPHONE ENCOUNTER
PCP: Cindy Swan MD    Last appt: 3/9/2020  Future Appointments   Date Time Provider Zeny Oneil   5/4/2020  9:50 AM [facility]       Requested Prescriptions     Pending Prescriptions Disp Refills    diclofenac EC (VOLTAREN) 75 mg EC tablet [Pharmacy Med Name: *DICLOFENAC  75MG DR] 60 Tab 10     Sig: TAKE 1 TABLET TWICE A DAY WITH FOOD FOR PAIN AND INFLAMMATION       Prior labs and Blood pressures:  BP Readings from Last 3 Encounters:   03/09/20 136/80   02/12/20 138/88   01/31/20 126/78     Lab Results   Component Value Date/Time    Sodium 141 01/31/2020 09:44 AM    Potassium 4.1 01/31/2020 09:44 AM    Chloride 99 01/31/2020 09:44 AM    CO2 30.0 01/31/2020 09:44 AM    Anion gap 12 01/31/2020 09:44 AM    Glucose 138 (H) 01/31/2020 09:44 AM    BUN 18.0 (H) 01/31/2020 09:44 AM    Creatinine 0.7 01/31/2020 09:44 AM    BUN/Creatinine ratio 26 01/31/2020 09:44 AM    GFR est AA >60 01/31/2020 09:44 AM    GFR est non-AA >60 01/31/2020 09:44 AM    Calcium 9.6 01/31/2020 09:44 AM     Lab Results   Component Value Date/Time    Hemoglobin A1c 5.9 (H) 01/31/2020 09:44 AM    Hemoglobin A1c (POC) 5.6 06/26/2018 10:24 AM     Lab Results   Component Value Date/Time    Cholesterol, total 201 (H) 01/31/2020 09:44 AM    Cholesterol (POC) 184.0 06/26/2018 10:24 AM    HDL Cholesterol 60 01/31/2020 09:44 AM    HDL Cholesterol (POC) 62.0 06/26/2018 10:24 AM    LDL Cholesterol (POC) 90.8 06/26/2018 10:24 AM    LDL, calculated 100 01/31/2020 09:44 AM    VLDL, calculated 49 (H) 01/02/2019 10:02 AM    VLDL 41 01/31/2020 09:44 AM    Triglyceride 205 (H) 01/31/2020 09:44 AM    Triglycerides (POC) 156.0 06/26/2018 10:24 AM    CHOL/HDL Ratio 3 01/31/2020 09:44 AM     No results found for: YING Vela    Lab Results   Component Value Date/Time    TSH 5.170 (H) 09/20/2018 11:50 AM    TSH, 3rd generation 2.64 10/31/2019 09:38 AM

## 2020-05-01 NOTE — PROGRESS NOTES
Chief Complaint   Patient presents with    Hypertension     3 month follow up    Diabetes    Chronic Kidney Disease       SUBJECTIVE:    Liv Howe is a 77 y.o. female who returns in follow-up for medical problems include hypertension, diabetes, hyperlipidemia, Crohn's disease with history small bowel obstruction, CKD stage II, DJD and other multiple medical problems. She is taking her medications and trying to follow her diet and remain physically active. She currently denies any chest pain, shortness of breath, palpitations, PND, orthopnea or other cardiorespiratory complaints. She notes no GI or  complaints. She notes no headaches, dizziness or neurologic complaints. She has no current active arthritic complaints and and no other complaints on complete review of systems. Current Outpatient Medications   Medication Sig Dispense Refill    diclofenac EC (VOLTAREN) 75 mg EC tablet TAKE 1 TABLET TWICE A DAY WITH FOOD FOR PAIN AND INFLAMMATION 60 Tab 10    levothyroxine (SYNTHROID) 50 mcg tablet TAKE 1 TABLET EVERY DAY 30 Tab 11    hydroCHLOROthiazide (HYDRODIURIL) 25 mg tablet TAKE 1 TABLET EVERY DAY 30 Tab 11    amLODIPine (NORVASC) 2.5 mg tablet TAKE 1 TABLET EVERY DAY 30 Tab 11    nateglinide (STARLIX) 120 mg tablet TAKE 1 TABLET THREE TIMES DAILY BEFORE MEALS 90 Tab 12    GUAIFENESIN/PSEUDOEPHEDRNE HCL (MUCINEX D PO) Take  by mouth as needed.  MULTIVIT,TH IRON,OTHER MIN (COMPLETE MULTIVITAMIN PO) Take 1 Tab by mouth daily. Indications: Complete multivitamin Women's 50+      calcium-cholecalciferol, D3, (CALTRATE 600+D) tablet Take 1 Tab by mouth daily.        Past Medical History:   Diagnosis Date    Acquired hypothyroidism 7/26/2017    CKD (chronic kidney disease) 7/26/2017    [facility]     Diabetes ([facility] [facility]     Hypertension     On statin therapy 7/26/2017    Pleurisy 07/17/2019    Post-menopausal 7/26/2017     Past Surgical History:   Procedure Laterality Date    BREAST SURGERY PROCEDURE UNLISTED      cyst removed right    COLONOSCOPY N/A 2018    COLON[facility]COPY performed by [facility] at [facility] END[facility]COPY    COLONOSCOPY,DIAGNOSTIC  2018         HX BREAST BIOPSY Right     over 30 years  negative    HX GI      tumor from colon removed    HX ORTHOPAEDIC      right wrist    HX OTHER SURGICAL      Bowel Obstruction    [place] COLONOSCOPY W/BIOPSY SINGLE/MULTIPLE  2014          Allergies   Allergen Reactions    Lisinopril Cough       REVIEW OF SYSTEMS:  General: negative for - chills or fever, or weight loss or gain  ENT: negative for - headaches, nasal congestion or tinnitus  Eyes: no blurred or visual changes  Neck: No stiffness or swollen nodes  Respiratory: negative for - cough, hemoptysis, shortness of breath or wheezing  Cardiovascular : negative for - chest pain, edema, palpitations or shortness of breath  Gastrointestinal: negative for - abdominal pain, blood in stools, heartburn or nausea/vomiting  Genito-Urinary: no dysuria, trouble voiding, or hematuria  Musculoskeletal: negative for - gait disturbance, joint pain, joint stiffness or joint swelling  Neurological: no TIA or stroke symptoms  Hematologic: no bruises, no bleeding  Lymphatic: no swollen glands  Integument: no lumps, mole changes, nail changes or rash  Endocrine:no malaise/lethargy poly uria or polydipsia or unexpected weight changes        Social History     Socioeconomic History    Marital status:      Spouse name: Not on file    Number of children: Not on file    Years of education: Not on file    Highest education level: Not on file   Tobacco Use    Smoking status: Former Smoker     Packs/day: 1.00     Years: 25.00     Pack years: 25.00     Last attempt to quit:      Years since quittin.3    Smokeless tobacco: Never Used   Substance and Sexual Activity    Alcohol use: Yes     Comment: 2 drinks per month     Drug use: No    Sexual activity: Never     Family History   Problem Relation Age of Onset    Diabetes Mother     Heart Failure Mother     Cancer Father         stomach       OBJECTIVE:     Visit Vitals  /84 (BP 1 Location: Left arm, BP Patient Position: Sitting)   Pulse (!) 102   Temp 98 °F (36.7 °C) (Oral)   Resp 18   Ht 5' 7\" (1.702 m)   Wt 189 lb 4.8 oz (85.9 kg)   SpO2 96%   BMI 29.65 kg/m²     CONSTITUTIONAL:   well nourished, appears age appropriate  EYES: sclera anicteric, PERRL, EOMI  ENMT:nares clear, moist mucous membranes, pharynx clear  NECK: supple. Thyroid normal, No JVD or bruits  RESPIRATORY: Chest: clear to ascultation and percussion, normal inspiratory effort  CARDIOVASCULAR: Heart: regular rate and rhythm no murmurs, rubs or gallops, PMI not displaced, No thrills, no peripheral edema  GASTROINTESTINAL: Abdomen: non distended, soft, non tender, bowel sounds normal  HEMATOLOGIC: no purpura, petechiae or bruising  LYMPHATIC: No lymph node enlargemant  MUSCULOSKELETAL: Extremities: no active synovitis, pulse 1+   INTEGUMENT: No unusual rashes or suspicious skin lesions noted. Nails appear normal.  PERIPHERAL VASCULAR: normal pulses femoral, PT and DP  NEUROLOGIC: non-focal exam, A & O X 3  PSYCHIATRIC:, appropriate affect     ASSESSMENT:   1. Essential hypertension    2. Controlled type 2 diabetes mellitus with stage 2 chronic kidney disease, without long-term current use of insulin ([address]    3. Mixed hyperlipidemia    4. Primary osteoarthritis involving multiple joints    5. Stage 2 chronic kidney disease    6. PAF (paroxysmal atrial fibrillation) (HCC)      Impression  1. Hypertension that is controlled so continue current therapy reviewed with her. 2.  Diabetes repeat status pending a prior lab reviewed and I will make adjustments if necessary. 3.  Hyperlipidemia prior lab reviewed and repeat status pending and I will adjust if needed. 4. DJD that is stable  5. CKD stage II repeat status pending  6.   Paroxysmal atrial fibrillation with no recent symptoms  I will call with lab and make further recommendations or adjustments if necessary. Follow-up in 3 months or sooner if there is a problem. PLAN:  .  Orders Placed This Encounter    METABOLIC PANEL, COMPREHENSIVE ([facility])    LIPID PANEL ([facility])    CK ([facility])    HEMOGLOBIN A1C W/O EAG ([facility])         ATTENTION:   This medical record was transcribed using an electronic medical records system. Although proofread, it may and can contain electronic and spelling errors. Other human spelling and other errors may be present. Corrections may be executed at a later time. Please feel free to contact us for any clarifications as needed. Follow-up and Dispositions    · Return in about 3 months (around 8/4/2020). No results found for any visits on 05/04/20. Olivia Betancourt MD    The patient verbalized understanding of the problems and plans as explained.

## 2020-05-04 ENCOUNTER — OFFICE VISIT (OUTPATIENT)
Dept: INTERNAL MEDICINE CLINIC | Age: 75
End: 2020-05-04

## 2020-05-04 VITALS
BODY MASS INDEX: 29.71 KG/M2 | DIASTOLIC BLOOD PRESSURE: 84 MMHG | SYSTOLIC BLOOD PRESSURE: 126 MMHG | RESPIRATION RATE: 18 BRPM | HEART RATE: 102 BPM | HEIGHT: 67 IN | TEMPERATURE: 98 F | WEIGHT: 189.3 LBS | OXYGEN SATURATION: 96 %

## 2020-05-04 DIAGNOSIS — N18.2 STAGE 2 CHRONIC KIDNEY DISEASE: ICD-10-CM

## 2020-05-04 DIAGNOSIS — N18.2 CONTROLLED TYPE 2 DIABETES MELLITUS WITH STAGE 2 CHRONIC KIDNEY DISEASE, WITHOUT LONG-TERM CURRENT USE OF INSULIN (HCC): ICD-10-CM

## 2020-05-04 DIAGNOSIS — E78.2 MIXED HYPERLIPIDEMIA: ICD-10-CM

## 2020-05-04 DIAGNOSIS — E11.22 CONTROLLED TYPE 2 DIABETES MELLITUS WITH STAGE 2 CHRONIC KIDNEY DISEASE, WITHOUT LONG-TERM CURRENT USE OF INSULIN (HCC): ICD-10-CM

## 2020-05-04 DIAGNOSIS — I10 ESSENTIAL HYPERTENSION: Primary | ICD-10-CM

## 2020-05-04 DIAGNOSIS — M15.9 PRIMARY OSTEOARTHRITIS INVOLVING MULTIPLE JOINTS: ICD-10-CM

## 2020-05-04 DIAGNOSIS — I48.0 PAF (PAROXYSMAL ATRIAL FIBRILLATION) (HCC): ICD-10-CM

## 2020-05-04 LAB
A-G RATIO,AGRAT: 1.5 RATIO
ALBUMIN SERPL-MCNC: 4.3 G/DL (ref 3.9–5.4)
ALP SERPL-CCNC: 101 U/L (ref 38–126)
ALT SERPL-CCNC: 38 U/L (ref 0–35)
ANION GAP SERPL CALC-SCNC: 10 MMOL/L
AST SERPL W P-5'-P-CCNC: 45 U/L (ref 14–36)
BILIRUB SERPL-MCNC: 0.9 MG/DL (ref 0.2–1.3)
BUN SERPL-MCNC: 18 MG/DL (ref 7–17)
BUN/CREATININE RATIO,BUCR: 26 RATIO
CALCIUM SERPL-MCNC: 9.6 MG/DL (ref 8.4–10.2)
CHLORIDE SERPL-SCNC: 102 MMOL/L (ref 98–107)
CHOL/HDL RATIO,CHHD: 3 RATIO (ref 0–4)
CHOLEST SERPL-MCNC: 221 MG/DL (ref 0–200)
CK SERPL-CCNC: 469 U/L (ref 30–135)
CO2 SERPL-SCNC: 28 MMOL/L (ref 22–32)
CREAT SERPL-MCNC: 0.7 MG/DL (ref 0.7–1.2)
GLOBULIN,GLOB: 2.8
GLUCOSE SERPL-MCNC: 157 MG/DL (ref 65–105)
HBA1C MFR BLD HPLC: 6.8 % (ref 4–5.7)
HDLC SERPL-MCNC: 70 MG/DL (ref 35–130)
LDL/HDL RATIO,LDHD: 1 RATIO
LDLC SERPL CALC-MCNC: 92 MG/DL (ref 0–130)
POTASSIUM SERPL-SCNC: 4.7 MMOL/L (ref 3.6–5)
PROT SERPL-MCNC: 7.1 G/DL (ref 6.3–8.2)
SODIUM SERPL-SCNC: 140 MMOL/L (ref 137–145)
TRIGL SERPL-MCNC: 294 MG/DL (ref 0–200)
VLDLC SERPL CALC-MCNC: 59 MG/DL

## 2020-05-04 NOTE — PROGRESS NOTES
Chief Complaint   Patient presents with    Hypertension     3 month follow up    Diabetes    Chronic Kidney Disease     Visit Vitals  /84 (BP 1 Location: Left arm, BP Patient Position: Sitting)   Pulse (!) 102   Temp 98 °F (36.7 °C) (Oral)   Resp 18   Ht 5' 7\" (1.702 m)   Wt 189 lb 4.8 oz (85.9 kg)   SpO2 96%   BMI 29.65 kg/m²     1. Have you been to the ER, urgent care clinic since your last visit? Hospitalized since your last visit? No    2. Have you seen or consulted any other health care providers outside of the [address] [place] [address] since your last visit? Include any pap smears or colon screening.  No

## 2020-05-06 RX ORDER — METFORMIN HYDROCHLORIDE 500 MG/1
500 TABLET ORAL 2 TIMES DAILY WITH MEALS
Qty: 90 TAB | Refills: 3 | Status: SHIPPED | OUTPATIENT
Start: 2020-05-06 | End: 2020-11-09

## 2020-05-06 NOTE — TELEPHONE ENCOUNTER
RX refill request from the patient/pharmacy. Patient last seen 05- with labs, and next appt. scheduled for 08-  Requested Prescriptions     Pending Prescriptions Disp Refills    metFORMIN (GLUCOPHAGE) 500 mg tablet 90 Tab 3     Sig: Take 1 Tab by mouth two (2) times daily (with meals).    Nicole Ta

## 2020-05-06 NOTE — PROGRESS NOTES
Increase blood sugar, glycol and triglycerides so add metformin 500 daily and watch diet. Discussed with patient. Rx sent to patient's pharmacy. Advised to work on diet and exercise.

## 2020-07-20 RX ORDER — NATEGLINIDE 120 MG/1
TABLET ORAL
Qty: 90 TAB | Refills: 11 | Status: SHIPPED | OUTPATIENT
Start: 2020-07-20 | End: 2021-08-13

## 2020-07-20 NOTE — TELEPHONE ENCOUNTER
RX refill request from the patient/pharmacy. Patient last seen 05- with labs, and next appt. scheduled for 08-  Requested Prescriptions     Pending Prescriptions Disp Refills    nateglinide (STARLIX) 120 mg tablet [Pharmacy Med Name: NATEGLINIDE 120 MG] 90 Tab 11     Sig: TAKE 1 TABLET THREE TIMES DAILY BEFORE MEALS   .

## 2020-08-04 ENCOUNTER — OFFICE VISIT (OUTPATIENT)
Dept: INTERNAL MEDICINE CLINIC | Age: 75
End: 2020-08-04
Payer: MEDICARE

## 2020-08-04 VITALS
HEIGHT: 67 IN | BODY MASS INDEX: 28.09 KG/M2 | HEART RATE: 88 BPM | RESPIRATION RATE: 18 BRPM | SYSTOLIC BLOOD PRESSURE: 118 MMHG | DIASTOLIC BLOOD PRESSURE: 78 MMHG | OXYGEN SATURATION: 96 % | TEMPERATURE: 97.6 F | WEIGHT: 179 LBS

## 2020-08-04 DIAGNOSIS — I48.0 PAF (PAROXYSMAL ATRIAL FIBRILLATION) (HCC): ICD-10-CM

## 2020-08-04 DIAGNOSIS — E11.22 CONTROLLED TYPE 2 DIABETES MELLITUS WITH STAGE 2 CHRONIC KIDNEY DISEASE, WITHOUT LONG-TERM CURRENT USE OF INSULIN (HCC): ICD-10-CM

## 2020-08-04 DIAGNOSIS — M15.9 PRIMARY OSTEOARTHRITIS INVOLVING MULTIPLE JOINTS: ICD-10-CM

## 2020-08-04 DIAGNOSIS — I10 ESSENTIAL HYPERTENSION: Primary | ICD-10-CM

## 2020-08-04 DIAGNOSIS — N18.2 CONTROLLED TYPE 2 DIABETES MELLITUS WITH STAGE 2 CHRONIC KIDNEY DISEASE, WITHOUT LONG-TERM CURRENT USE OF INSULIN (HCC): ICD-10-CM

## 2020-08-04 DIAGNOSIS — N18.2 STAGE 2 CHRONIC KIDNEY DISEASE: ICD-10-CM

## 2020-08-04 DIAGNOSIS — K50.80 CROHN'S DISEASE OF BOTH SMALL AND LARGE INTESTINE WITHOUT COMPLICATION (HCC): ICD-10-CM

## 2020-08-04 DIAGNOSIS — E78.2 MIXED HYPERLIPIDEMIA: ICD-10-CM

## 2020-08-04 LAB
A-G RATIO,AGRAT: 1.6 RATIO
ALBUMIN SERPL-MCNC: 4.4 G/DL (ref 3.9–5.4)
ALP SERPL-CCNC: 91 U/L (ref 38–126)
ALT SERPL-CCNC: 27 U/L (ref 0–35)
ANION GAP SERPL CALC-SCNC: 11 MMOL/L
AST SERPL W P-5'-P-CCNC: 34 U/L (ref 14–36)
BILIRUB SERPL-MCNC: 0.8 MG/DL (ref 0.2–1.3)
BUN SERPL-MCNC: 20 MG/DL (ref 7–17)
BUN/CREATININE RATIO,BUCR: 25 RATIO
CALCIUM SERPL-MCNC: 9.8 MG/DL (ref 8.4–10.2)
CHLORIDE SERPL-SCNC: 102 MMOL/L (ref 98–107)
CK SERPL-CCNC: 351 U/L (ref 30–135)
CO2 SERPL-SCNC: 29 MMOL/L (ref 22–32)
CREAT SERPL-MCNC: 0.8 MG/DL (ref 0.7–1.2)
GLOBULIN,GLOB: 2.8
GLUCOSE SERPL-MCNC: 124 MG/DL (ref 65–105)
HBA1C MFR BLD HPLC: 5.4 % (ref 4–5.7)
POTASSIUM SERPL-SCNC: 4.3 MMOL/L (ref 3.6–5)
PROT SERPL-MCNC: 7.2 G/DL (ref 6.3–8.2)
SODIUM SERPL-SCNC: 142 MMOL/L (ref 137–145)

## 2020-08-04 PROCEDURE — G8419 CALC BMI OUT NRM PARAM NOF/U: HCPCS | Performed by: INTERNAL MEDICINE

## 2020-08-04 PROCEDURE — 82550 ASSAY OF CK (CPK): CPT | Performed by: INTERNAL MEDICINE

## 2020-08-04 PROCEDURE — 3017F COLORECTAL CA SCREEN DOC REV: CPT | Performed by: INTERNAL MEDICINE

## 2020-08-04 PROCEDURE — 99214 OFFICE O/P EST MOD 30 MIN: CPT | Performed by: INTERNAL MEDICINE

## 2020-08-04 PROCEDURE — 83036 HEMOGLOBIN GLYCOSYLATED A1C: CPT | Performed by: INTERNAL MEDICINE

## 2020-08-04 PROCEDURE — 1090F PRES/ABSN URINE INCON ASSESS: CPT | Performed by: INTERNAL MEDICINE

## 2020-08-04 PROCEDURE — G8752 SYS BP LESS 140: HCPCS | Performed by: INTERNAL MEDICINE

## 2020-08-04 PROCEDURE — G8399 PT W/DXA RESULTS DOCUMENT: HCPCS | Performed by: INTERNAL MEDICINE

## 2020-08-04 PROCEDURE — 1101F PT FALLS ASSESS-DOCD LE1/YR: CPT | Performed by: INTERNAL MEDICINE

## 2020-08-04 PROCEDURE — G8536 NO DOC ELDER MAL SCRN: HCPCS | Performed by: INTERNAL MEDICINE

## 2020-08-04 PROCEDURE — G9899 SCRN MAM PERF RSLTS DOC: HCPCS | Performed by: INTERNAL MEDICINE

## 2020-08-04 PROCEDURE — 3044F HG A1C LEVEL LT 7.0%: CPT | Performed by: INTERNAL MEDICINE

## 2020-08-04 PROCEDURE — 2022F DILAT RTA XM EVC RTNOPTHY: CPT | Performed by: INTERNAL MEDICINE

## 2020-08-04 PROCEDURE — 80053 COMPREHEN METABOLIC PANEL: CPT | Performed by: INTERNAL MEDICINE

## 2020-08-04 PROCEDURE — G8510 SCR DEP NEG, NO PLAN REQD: HCPCS | Performed by: INTERNAL MEDICINE

## 2020-08-04 PROCEDURE — G8427 DOCREV CUR MEDS BY ELIG CLIN: HCPCS | Performed by: INTERNAL MEDICINE

## 2020-08-04 PROCEDURE — G8754 DIAS BP LESS 90: HCPCS | Performed by: INTERNAL MEDICINE

## 2020-08-04 NOTE — PROGRESS NOTES
Chief Complaint   Patient presents with    Hypertension     3 month follow up    Diabetes    Cholesterol Problem     Visit Vitals  /78 (BP 1 Location: Left arm, BP Patient Position: Sitting)   Pulse 88   Temp 97.6 °F (36.4 °C) (Oral)   Resp 18   Ht 5' 7\" (1.702 m)   Wt 179 lb (81.2 kg)   SpO2 96%   BMI 28.04 kg/m²     1. Have you been to the ER, urgent care clinic since your last visit? Hospitalized since your last visit? No    2. Have you seen or consulted any other health care providers outside of the [address] [place] [address] since your last visit? Include any pap smears or colon screening.  Dr. Jan Borja dr 7/2020

## 2020-08-04 NOTE — PATIENT INSTRUCTIONS

## 2020-08-04 NOTE — PROGRESS NOTES
Chief Complaint   Patient presents with    Hypertension     3 month follow up    Diabetes    Cholesterol Problem       SUBJECTIVE:    Paige Benson is a 77 y.o. female who returns in follow-up for medical problems include hypertension, diabetes, hyperlipidemia, paroxysmal atrial fibrillation, DJD, CKD stage II and other medical problems. She is taking her medications and trying to follow her diet and remain physically active. She currently denies any chest pain, shortness of breath, palpitations, PND, orthopnea or other cardiorespiratory complaints. She notes no GI or  complaints. She notes no headaches, dizziness or neurologic complaints. She has no current active arthritic complaints and and no other complaints on complete review of systems. Current Outpatient Medications   Medication Sig Dispense Refill    nateglinide (STARLIX) 120 mg tablet TAKE 1 TABLET THREE TIMES DAILY BEFORE MEALS 90 Tab 11    metFORMIN (GLUCOPHAGE) 500 mg tablet Take 1 Tab by mouth two (2) times daily (with meals). 90 Tab 3    diclofenac EC (VOLTAREN) 75 mg EC tablet TAKE 1 TABLET TWICE A DAY WITH FOOD FOR PAIN AND INFLAMMATION 60 Tab 10    levothyroxine (SYNTHROID) 50 mcg tablet TAKE 1 TABLET EVERY DAY 30 Tab 11    hydroCHLOROthiazide (HYDRODIURIL) 25 mg tablet TAKE 1 TABLET EVERY DAY 30 Tab 11    amLODIPine (NORVASC) 2.5 mg tablet TAKE 1 TABLET EVERY DAY 30 Tab 11    GUAIFENESIN/PSEUDOEPHEDRNE HCL (MUCINEX D PO) Take  by mouth as needed.  MULTIVIT,TH IRON,OTHER MIN (COMPLETE MULTIVITAMIN PO) Take 1 Tab by mouth daily. Indications: Complete multivitamin Women's 50+      calcium-cholecalciferol, D3, (CALTRATE 600+D) tablet Take 1 Tab by mouth daily.        Past Medical History:   Diagnosis Date    Acquired hypothyroidism 7/26/2017    CKD (chronic kidney disease) 7/26/2017    Cro[facility]'s disease ([facility] [address]     Diabetes ([facility] [address]     Hypertension     On statin therapy 7/26/2017    Pleurisy 07/17/2019    Post-menopausal 2017     Past Surgical History:   Procedure Laterality Date    BREAST SURGERY PROCEDURE UNLISTED      cyst removed right    COLONOSCOPY N/A 2018    [facility]    COLONOSCOPY,DIAGNOSTIC  2018         HX BREAST BIOPSY Right     over 30 years  negative    HX GI      tumor from colon removed    HX ORTHOPAEDIC      right wrist    HX OTHER SURGICAL      Bowel Obstruction    [place] COLONOSCOPY W/BIOPSY SINGLE/MULTIPLE  2014          Allergies   Allergen Reactions    Lisinopril Cough       REVIEW OF SYSTEMS:  General: negative for - chills or fever, or weight loss or gain  ENT: negative for - headaches, nasal congestion or tinnitus  Eyes: no blurred or visual changes  Neck: No stiffness or swollen nodes  Respiratory: negative for - cough, hemoptysis, shortness of breath or wheezing  Cardiovascular : negative for - chest pain, edema, palpitations or shortness of breath  Gastrointestinal: negative for - abdominal pain, blood in stools, heartburn or nausea/vomiting  Genito-Urinary: no dysuria, trouble voiding, or hematuria  Musculoskeletal: negative for - gait disturbance, joint pain, joint stiffness or joint swelling  Neurological: no TIA or stroke symptoms  Hematologic: no bruises, no bleeding  Lymphatic: no swollen glands  Integument: no lumps, mole changes, nail changes or rash  Endocrine:no malaise/lethargy poly uria or polydipsia or unexpected weight changes        Social History     Socioeconomic History    Marital status:      Spouse name: Not on file    Number of children: Not on file    Years of education: Not on file    Highest education level: Not on file   Tobacco Use    Smoking status: Former Smoker     Packs/day: 1.00     Years: 25.00     Pack years: 25.00     Last attempt to quit: 1979     Years since quittin.6    Smokeless tobacco: Never Used   Substance and Sexual Activity    Alcohol use: Yes     Comment: 2 drinks per month     Drug use: No    Sexual activity: Never     Family History   Problem Relation Age of Onset    Diabetes Mother     Heart Failure Mother     Cancer Father         stomach       OBJECTIVE:     Visit Vitals  /78 (BP 1 Location: Left arm, BP Patient Position: Sitting)   Pulse 88   Temp 97.6 °F (36.4 °C) (Oral)   Resp 18   Ht 5' 7\" (1.702 m)   Wt 179 lb (81.2 kg)   SpO2 96%   BMI 28.04 kg/m²     CONSTITUTIONAL:   well nourished, appears age appropriate  EYES: sclera anicteric, PERRL, EOMI  ENMT:nares clear, moist mucous membranes, pharynx clear  NECK: supple. Thyroid normal, No JVD or bruits  RESPIRATORY: Chest: clear to ascultation and percussion, normal inspiratory effort  CARDIOVASCULAR: Heart: regular rate and rhythm no murmurs, rubs or gallops, PMI not displaced, No thrills, no peripheral edema  GASTROINTESTINAL: Abdomen: non distended, soft, non tender, bowel sounds normal  HEMATOLOGIC: no purpura, petechiae or bruising  LYMPHATIC: No lymph node enlargemant  MUSCULOSKELETAL: Extremities: no active synovitis, pulse 1+   INTEGUMENT: No unusual rashes or suspicious skin lesions noted. Nails appear normal.  PERIPHERAL VASCULAR: normal pulses femoral, PT and DP  NEUROLOGIC: non-focal exam, A & O X 3  PSYCHIATRIC:, appropriate affect     ASSESSMENT:   1. Essential hypertension    2. Controlled type 2 diabetes mellitus with stage 2 chronic kidney disease, without long-term current use of insulin ([address]    3. Mixed hyperlipidemia    4. Primary osteoarthritis involving multiple joints    5. PAF (paroxysmal atrial fibrillation) (HCC)    6. Stage 2 chronic kidney disease    7. Crohn's disease of both small and large intestine without complication ([address]      Pression  1. Hypertension that is controlled so continue current therapy reviewed with her. 2.  Diabetes mellitus repeat status pending a prior lab review not make adjustments if necessary.   3.  Hyperlipidemia prior lab reviewed and repeat status pending I will adjust if needed. 4. DJD that is stable  5. Paroxysmal atrial fibrillation no evidence of recent recurrence  6. CKD stage II repeat status pending  7. Crohn's disease no recent flares  I will call with lab and make further recommendations or adjustments if necessary. Follow-up in 3 months or sooner if there is a problem. PLAN:  .  Orders Placed This Encounter    LIPID PANEL    METABOLIC PANEL, COMPREHENSIVE ([facility])    CK ([facility])    HEMOGLOBIN A1C W/O EAG ([facility])         ATTENTION:   This medical record was transcribed using an electronic medical records system. Although proofread, it may and can contain electronic and spelling errors. Other human spelling and other errors may be present. Corrections may be executed at a later time. Please feel free to contact us for any clarifications as needed. Follow-up and Dispositions    · Return in about 3 months (around 11/4/2020). No results found for any visits on 08/04/20. Ernesto Garcia MD    The patient verbalized understanding of the problems and plans as explained.

## 2020-08-05 LAB
CHOLEST SERPL-MCNC: 185 MG/DL (ref 100–199)
HDLC SERPL-MCNC: 59 MG/DL
LDLC SERPL CALC-MCNC: 82 MG/DL (ref 0–99)
TRIGL SERPL-MCNC: 221 MG/DL (ref 0–149)
VLDLC SERPL CALC-MCNC: 44 MG/DL (ref 5–40)

## 2020-09-11 ENCOUNTER — HOSPITAL ENCOUNTER (OUTPATIENT)
Dept: MAMMOGRAPHY | Age: 75
Discharge: HOME | End: 2020-09-11
Attending: INTERNAL MEDICINE
Payer: MEDICARE

## 2020-09-11 DIAGNOSIS — Z12.31 VISIT FOR SCREENING MAMMOGRAM: ICD-10-CM

## 2020-09-11 PROCEDURE — 77067 SCR MAMMO BI INCL CAD: CPT

## 2020-09-19 DIAGNOSIS — I10 ESSENTIAL HYPERTENSION: ICD-10-CM

## 2020-09-21 RX ORDER — AMLODIPINE BESYLATE 2.5 MG/1
TABLET ORAL
Qty: 30 TAB | Refills: 11 | Status: SHIPPED | OUTPATIENT
Start: 2020-09-21 | End: 2021-09-13

## 2020-09-21 NOTE — TELEPHONE ENCOUNTER
RX refill request from the patient/pharmacy. Patient last seen 08- with labs, and next appt. scheduled for 11-  Requested Prescriptions     Pending Prescriptions Disp Refills    amLODIPine (NORVASC) 2.5 mg tablet [Pharmacy Med Name: AMLODIPINE 2.5MG] 30 Tab 11     Sig: TAKE 1 TABLET EVERY DAY   .

## 2020-10-26 RX ORDER — HYDROCHLOROTHIAZIDE 25 MG/1
TABLET ORAL
Qty: 30 TAB | Refills: 11 | Status: SHIPPED | OUTPATIENT
Start: 2020-10-26 | End: 2021-10-22

## 2020-10-26 NOTE — TELEPHONE ENCOUNTER
RX refill request from the patient/pharmacy. Patient last seen 08- with labs, and next appt. scheduled for 11-  Requested Prescriptions     Pending Prescriptions Disp Refills    hydroCHLOROthiazide (HYDRODIURIL) 25 mg tablet [Pharmacy Med Name: *HYDROCHLOROTHIAZIDE 25MG] 30 Tab 11     Sig: TAKE 1 TABLET BY MOUTH EVERY DAY   .

## 2020-11-03 NOTE — PROGRESS NOTES
This is a Subsequent Medicare Annual Wellness Visit providing Personalized Prevention Plan Services (PPPS) (Performed 12 months after initial AWV and PPPS )    I have reviewed the patient's medical history in detail and updated the computerized patient record. She returns today for a Medicare subsequent annual wellness examination and screening questionnaire. She is also in follow-up of her multimedical problems include hypertension, diabetes, hyperlipidemia, paroxysmal atrial fibrillation, Crohn's disease with history of small bowel obstruction, CKD stage II, DJD and other medical problems. She is taking her medications and trying to follow her diet and remain physically active. She currently denies any chest pain, shortness of breath, palpitations, PND, orthopnea or other cardiac or respiratory complaints. She notes no GI or  complaints. She notes no headaches, dizziness or neurologic complaints. There are no current active arthritic complaints and she has no other complaints on complete review of systems.     History     Past Medical History:   Diagnosis Date    Acquired hypothyroidism 7/26/2017    CKD (chronic kidney disease) 7/26/2017    [facility] [address]     Diabetes ([facility] [address]     Hypertension     On statin therapy 7/26/2017    Pleurisy 07/17/2019    Post-menopausal 7/26/2017      Past Surgical History:   Procedure Laterality Date    BREAST SURGERY PROCEDURE UNLISTED      cyst removed right    COLONOSCOPY N/A 5/1/2018    COLON[facility]COPY performed [facility]is [facility] at [facility] ENDOSCOPY    COLONOSCOPY,DIAGNOSTIC  5/1/2018         HX BREAST BIOPSY Right     over 30 years  negative    HX GI      tumor from colon removed    HX ORTHOPAEDIC      right wrist    HX OTHER SURGICAL      Bowel Obstruction    HI COLONOSCOPY W/BIOPSY SINGLE/MULTIPLE  4/9/2014          Social History     Tobacco Use    Smoking status: Former Smoker     Packs/day: 1.00     Years: 25.00     Pack years: 25.00 Last attempt to quit: 1979     Years since quittin.8    Smokeless tobacco: Never Used   Substance Use Topics    Alcohol use: Yes     Comment: 2 drinks per month     Drug use: No     Current Outpatient Medications   Medication Sig Dispense Refill    hydroCHLOROthiazide (HYDRODIURIL) 25 mg tablet TAKE 1 TABLET BY MOUTH EVERY DAY 30 Tab 11    amLODIPine (NORVASC) 2.5 mg tablet TAKE 1 TABLET EVERY DAY 30 Tab 11    nateglinide (STARLIX) 120 mg tablet TAKE 1 TABLET THREE TIMES DAILY BEFORE MEALS 90 Tab 11    metFORMIN (GLUCOPHAGE) 500 mg tablet Take 1 Tab by mouth two (2) times daily (with meals). 90 Tab 3    diclofenac EC (VOLTAREN) 75 mg EC tablet TAKE 1 TABLET TWICE A DAY WITH FOOD FOR PAIN AND INFLAMMATION 60 Tab 10    levothyroxine (SYNTHROID) 50 mcg tablet TAKE 1 TABLET EVERY DAY 30 Tab 11    GUAIFENESIN/PSEUDOEPHEDRNE HCL (MUCINEX D PO) Take  by mouth as needed.  MULTIVIT,TH IRON,OTHER MIN (COMPLETE MULTIVITAMIN PO) Take 1 Tab by mouth daily. Indications: Complete multivitamin Women's 50+      calcium-cholecalciferol, D3, (CALTRATE 600+D) tablet Take 1 Tab by mouth daily.        Allergies   Allergen Reactions    Lisinopril Cough     Family History   Problem Relation Age of Onset    Diabetes Mother     Heart Failure Mother     Cancer Father         stomach       Patient Active Problem List    Diagnosis    Stage 2 chronic kidney disease    PAF (paroxysmal atrial fibrillation) (HCC)     Self Limiting      Essential hypertension    Controlled type 2 diabetes mellitus with stage 2 chronic kidney disease, without long-term current use of insulin (HCC)    Mixed hyperlipidemia    Primary osteoarthritis involving multiple joints    Acquired hypothyroidism    Edema    Alcohol screening    Pleurisy    Chest pain on breathing    Acute non-recurrent maxillary sinusitis    Acute bronchitis    Medicare annual wellness visit, subsequent    Colon cancer screening    Post-menopausal    SBO [facility]small bowel obstruction) ([facility] [address]    Crohn disease [facility])       Patient Care Team:  Bobby Mccabe MD as PCP - General (Internal Medicine)  Bobby Mccabe MD as PCP - [facility] Provider    Depression Risk Factor Screening:     3 most recent PHQ Screens 11/4/2020   Little interest or pleasure in doing things Not at all   Feeling down, depressed, irritable, or hopeless Not at all   Total Score PHQ 2 0     Alcohol Risk Factor Screening:   Do you average more than 1 drink per night or more than 7 drinks a week:  No    On any one occasion in the past three months have you have had more than 3 drinks containing alcohol:  No    Functional Ability and Level of Safety:     Fall Risk     Fall Risk Assessment, last 12 mths 11/4/2020   Able to walk? Yes   Fall in past 12 months? No       Hearing Loss   mild    Activities of Daily Living   Self-care. ADL Assessment 11/4/2020   Feeding yourself No Help Needed   Getting from bed to chair No Help Needed   Getting dressed No Help Needed   Bathing or showering No Help Needed   Walk across the room (includes cane/walker) No Help Needed   Using the telphone No Help Needed   Taking your medications No Help Needed   Preparing meals No Help Needed   Managing money (expenses/bills) No Help Needed   Moderately strenuous housework (laundry) No Help Needed   Shopping for personal items (toiletries/medicines) No Help Needed   Shopping for groceries No Help Needed   Driving No Help Needed   Climbing a flight of stairs No Help Needed   Getting to places beyond walking distances No Help Needed       Abuse Screen   Patient is not abused    Social History     Social History Narrative    Not on file       Review of Systems      ROS:    Constitutional: She denies fevers, weight loss, sweats. Eyes: No blurred or double vision. ENT: No difficulty with swallowing, taste, speech or smell.   NECK: no stiffness swelling or lymph node enlargement  Respiratory: No cough wheezing or shortness of breath. Cardiovascular: Denies chest pain, palpitations, unexplained indigestion or syncope. Breast: She has noted no masses or lumps and no discharge or axillary swelling  Gastrointestinal:  No changes in bowel movements, no abdominal pain, no bloating. Genitourinary: No discharge or abnormal bleeding or pain  Extremities: No joint pain, stiffness or swelling. Neurological:  No numbness, tingling, burring paresthesias or loss of motor strength. No syncope, dizziness or frequent headache  Skin:  No recent rashes or mole changes. Psychiatric/Behavioral:  Negative for depression. The patient is not nervous/anxious. HEMATOLOGIC: no easy bruising or bleeding gums  Endocrine: no sweats of urinary frequency or excessive thirst    Physical Examination     Evaluation of Cognitive Function:  Mood/affect:  happy  Appearance: age appropriate  Family member/caregiver input: none    Visit Vitals  /78 (BP 1 Location: Left arm, BP Patient Position: Sitting)   Pulse 88   Temp 97.9 °F (36.6 °C)   Resp 16   Ht 5' 7\" (1.702 m)   Wt 181 lb 3.2 oz (82.2 kg)   SpO2 97%   BMI 28.38 kg/m²     Vitals:    11/04/20 0921   BP: 132/78   Pulse: 88   Resp: 16   Temp: 97.9 °F (36.6 °C)   SpO2: 97%   Weight: 181 lb 3.2 oz (82.2 kg)   Height: 5' 7\" (1.702 m)   PainSc:   0 - No pain        PHYSICAL EXAM:    General appearance - alert, well appearing, and in no distress  Mental status - alert, oriented to person, place, and time  HEENT:  Ears - bilateral TM's and external ear canals clear  Eyes - pupillary responses were normal.  Extraocular muscle function intact. Lids and conjunctiva not injected. Fundoscopic exam revealed sharp disc margins. eye movements intact  Pharynx- clear with teeth in good repair. No masses were noted  Neck - supple without thyromegaly or burit. No JVD noted  Lungs - clear to auscultation and percussion  Cardiac- normal rate, regular rhythm without murmurs. PMI not displaced.   No gallop, rub or click  Breast: deferred to GYN  Abdomen - flat, soft, non-tender without palpable organomegaly or mass. No pulsatile mass was felt, and not bruit was heard. Bowel sounds were active   Female - deferred to GYN  Rectal - deferred to GYN  Extremities -  no clubbing cyanosis or edema  Lymphatics - no palpable lymphadenopathy, no hepatosplenomegaly  Peripheral vascular - Dorsalis pedis and posterior tibial pulses felt without difficulty  Skin - no rash or unusual mole change noted  Neurological - Cranial nerves II-XII grossly intact. Motor strength 5/5. DTR's 2+ and symmetric. Station and gait normal  Back exam - full range of motion, no tenderness, palpable spasm or pain on motion  Musculoskeletal - no joint tenderness, deformity or swelling  Hematologic: no purpura, petechiae or bruising    Results for orders placed or performed in visit on 08/04/20   LIPID PANEL   Result Value Ref Range    Cholesterol, total 185 100 - 199 mg/dL    Triglyceride 221 (H) 0 - 149 mg/dL    HDL Cholesterol 59 >39 mg/dL    VLDL, calculated 44 (H) 5 - 40 mg/dL    LDL, calculated 82 0 - 99 mg/dL   METABOLIC PANEL, COMPREHENSIVE   Result Value Ref Range    Glucose 124 (H) 65 - 105 mg/dL    BUN 20.0 (H) 7.0 - 17.0 mg/dL    Creatinine 0.8 0.7 - 1.2 mg/dL    Sodium 142 137 - 145 mmol/L    Potassium 4.3 3.6 - 5.0 mmol/L    Chloride 102 98 - 107 mmol/L    CO2 29.0 22.0 - 32.0 mmol/L    Calcium 9.8 8.4 - 10.2 mg/dl    Protein, total 7.2 6.3 - 8.2 g/dL    Albumin 4.4 3.9 - 5.4 g/dL    ALT (SGPT) 27 0 - 35 U/L    AST (SGOT) 34.0 14.0 - 36.0 U/L    Alk.  phosphatase 91 38 - 126 U/L    Bilirubin, total 0.8 0.2 - 1.3 mg/dL    BUN/Creatinine ratio 25 Ratio    GFR est AA >60 mL/min/1.73m2    GFR est non-AA >60 mL/min/1.73m2    Globulin 2.80     A-G Ratio 1.6 Ratio    Anion gap 11 mmol/L   CK   Result Value Ref Range    .00 (H) 30.00 - 135.00 U/L   HEMOGLOBIN A1C W/O EAG   Result Value Ref Range    Hemoglobin A1c 5.4 4.0 - 5.7 % Advice/Referrals/Counseling   Education and counseling provided:  Are appropriate based on today's review and evaluation  End-of-Life planning (with patient's consent)  Pneumococcal Vaccine  Influenza Vaccine  Colorectal cancer screening tests      Assessment/Plan     ASSESSMENT:   1. Essential hypertension    2. Controlled type 2 diabetes mellitus with stage 2 chronic kidney disease, without long-term current use of insulin ([facility] [address]    3. Mixed hyperlipidemia    4. PAF (paroxysmal atrial fibrillation) ([facility] [address]    5. Primary osteoarthritis involving multiple joints    6. Stage 2 chronic kidney disease    7. Acquired hypothyroidism    8. Crohn's disease of both small and large intestine without complication ([facility] [address]    9. Alcohol screening    10. Medicare annual wellness visit, subsequent    11. Needs flu shot      Impression  1. Hypertension that is controlled so continue current therapy reviewed with her. 2.  Diabetes repeat status pending and prior lab reviewed now make adjustments if necessary. 3.  Hyperlipidemia prior lab reviewed and repeat status pending and I will adjust if needed. 4.  Paroxysmal atrial fibrillation with no recent symptoms  5. DJD that is stable  6. CKD stage II repeat status pending  7. Hypothyroidism repeat status pending  8. Crohn's disease currently asymptomatic  9. Annual alcohol screening is done and she drinks maybe 2 times per month only socially. I did caution regarding females with more than 1 drink per day with increased cardiovascular risk and increased risk of liver disease and other GI effects. Flu shot given today. Medicare subsequent annual wellness examination screening questionnaire is completed today. The results were reviewed with her and her questions were answered. Lifestyle recommendations modifications discussed and made. I will call with lab results and make further recommendations or adjustments if necessary.   Follow-up in 3 months or sooner if there is a problem. PLAN:  .  Orders Placed This Encounter    Influenza Vaccine, QUAD, 72 Yrs +  IM  (Fluad 54871 )    CBC WITH AUTOMATED DIFF ([facility])    METABOLIC PANEL, COMPREHENSIVE ([facility])    LIPID PANEL ([facility])    CK ([facility])    HEMOGLOBIN A1C W/O EAG ([facility])    T4, FREE ([facility])    TSH 3RD GENERATION ([facility])    URINALYSIS W/O MICRO ([facility])    URINE, MICROALBUMIN, SEMIQUANTITATIVE ([facility])         ATTENTION:   This medical record was transcribed using an electronic medical records system. Although proofread, it may and can contain electronic and spelling errors. Other human spelling and other errors may be present. Corrections may be executed at a later time. Please feel free to contact us for any clarifications as needed. Follow-up and Dispositions    · Return in about 3 months (around 2/4/2021). Nyasia Moulton MD    Recommended healthy diet low in carbohydrates, fats, sodium and cholesterol. Recommended regular cardiovascular exercise 3-6 times per week for 30-60 minutes daily. Current Outpatient Medications   Medication Sig Dispense Refill    hydroCHLOROthiazide (HYDRODIURIL) 25 mg tablet TAKE 1 TABLET BY MOUTH EVERY DAY 30 Tab 11    amLODIPine (NORVASC) 2.5 mg tablet TAKE 1 TABLET EVERY DAY 30 Tab 11    nateglinide (STARLIX) 120 mg tablet TAKE 1 TABLET THREE TIMES DAILY BEFORE MEALS 90 Tab 11    metFORMIN (GLUCOPHAGE) 500 mg tablet Take 1 Tab by mouth two (2) times daily (with meals). 90 Tab 3    diclofenac EC (VOLTAREN) 75 mg EC tablet TAKE 1 TABLET TWICE A DAY WITH FOOD FOR PAIN AND INFLAMMATION 60 Tab 10    levothyroxine (SYNTHROID) 50 mcg tablet TAKE 1 TABLET EVERY DAY 30 Tab 11    GUAIFENESIN/PSEUDOEPHEDRNE HCL (MUCINEX D PO) Take  by mouth as needed.  MULTIVIT,TH IRON,OTHER MIN (COMPLETE MULTIVITAMIN PO) Take 1 Tab by mouth daily.  Indications: Complete multivitamin Women's 50+      calcium-cholecalciferol, D3, (CALTRATE 600+D) tablet Take 1 Tab by mouth daily. No results found for any visits on 11/04/20. Verbal and written instructions (see AVS) provided. Patient expresses understanding of diagnosis and treatment plan.     Jack Wright MD

## 2020-11-04 ENCOUNTER — OFFICE VISIT (OUTPATIENT)
Dept: INTERNAL MEDICINE CLINIC | Age: 75
End: 2020-11-04
Payer: MEDICARE

## 2020-11-04 VITALS
RESPIRATION RATE: 16 BRPM | DIASTOLIC BLOOD PRESSURE: 78 MMHG | BODY MASS INDEX: 28.44 KG/M2 | SYSTOLIC BLOOD PRESSURE: 132 MMHG | HEART RATE: 88 BPM | OXYGEN SATURATION: 97 % | WEIGHT: 181.2 LBS | TEMPERATURE: 97.9 F | HEIGHT: 67 IN

## 2020-11-04 DIAGNOSIS — Z13.39 ALCOHOL SCREENING: ICD-10-CM

## 2020-11-04 DIAGNOSIS — E78.2 MIXED HYPERLIPIDEMIA: ICD-10-CM

## 2020-11-04 DIAGNOSIS — K50.80 CROHN'S DISEASE OF BOTH SMALL AND LARGE INTESTINE WITHOUT COMPLICATION (HCC): ICD-10-CM

## 2020-11-04 DIAGNOSIS — Z23 NEEDS FLU SHOT: ICD-10-CM

## 2020-11-04 DIAGNOSIS — I10 ESSENTIAL HYPERTENSION: Primary | ICD-10-CM

## 2020-11-04 DIAGNOSIS — E03.9 ACQUIRED HYPOTHYROIDISM: ICD-10-CM

## 2020-11-04 DIAGNOSIS — I48.0 PAF (PAROXYSMAL ATRIAL FIBRILLATION) (HCC): ICD-10-CM

## 2020-11-04 DIAGNOSIS — N18.2 STAGE 2 CHRONIC KIDNEY DISEASE: ICD-10-CM

## 2020-11-04 DIAGNOSIS — M15.9 PRIMARY OSTEOARTHRITIS INVOLVING MULTIPLE JOINTS: ICD-10-CM

## 2020-11-04 DIAGNOSIS — E11.22 CONTROLLED TYPE 2 DIABETES MELLITUS WITH STAGE 2 CHRONIC KIDNEY DISEASE, WITHOUT LONG-TERM CURRENT USE OF INSULIN (HCC): ICD-10-CM

## 2020-11-04 DIAGNOSIS — Z00.00 MEDICARE ANNUAL WELLNESS VISIT, SUBSEQUENT: ICD-10-CM

## 2020-11-04 DIAGNOSIS — N18.2 CONTROLLED TYPE 2 DIABETES MELLITUS WITH STAGE 2 CHRONIC KIDNEY DISEASE, WITHOUT LONG-TERM CURRENT USE OF INSULIN (HCC): ICD-10-CM

## 2020-11-04 DIAGNOSIS — N39.0 URINARY TRACT INFECTION WITHOUT HEMATURIA, SITE UNSPECIFIED: ICD-10-CM

## 2020-11-04 LAB
A-G RATIO,AGRAT: 1.6 RATIO
ALBUMIN SERPL-MCNC: 4.4 G/DL (ref 3.9–5.4)
ALP SERPL-CCNC: 87 U/L (ref 38–126)
ALT SERPL-CCNC: 30 U/L (ref 0–35)
ANION GAP SERPL CALC-SCNC: 9 MMOL/L
AST SERPL W P-5'-P-CCNC: 37 U/L (ref 14–36)
BACTERIA,BACTU: ABNORMAL
BILIRUB SERPL-MCNC: 0.7 MG/DL (ref 0.2–1.3)
BILIRUB UR QL: NEGATIVE
BUN SERPL-MCNC: 22 MG/DL (ref 7–17)
BUN/CREATININE RATIO,BUCR: 31 RATIO
CALCIUM SERPL-MCNC: 9.5 MG/DL (ref 8.4–10.2)
CHLORIDE SERPL-SCNC: 104 MMOL/L (ref 98–107)
CHOL/HDL RATIO,CHHD: 3 RATIO (ref 0–4)
CHOLEST SERPL-MCNC: 193 MG/DL (ref 0–200)
CK SERPL-CCNC: 309 U/L (ref 30–135)
CLARITY: CLEAR
CO2 SERPL-SCNC: 31 MMOL/L (ref 22–32)
COLOR UR: ABNORMAL
CREAT SERPL-MCNC: 0.7 MG/DL (ref 0.7–1.2)
ERYTHROCYTE [DISTWIDTH] IN BLOOD BY AUTOMATED COUNT: 13 %
GLOBULIN,GLOB: 2.8
GLUCOSE 24H UR-MRATE: NEGATIVE G/(24.H)
GLUCOSE SERPL-MCNC: 123 MG/DL (ref 65–105)
HBA1C MFR BLD HPLC: 5.6 % (ref 4–5.7)
HCT VFR BLD AUTO: 39.1 % (ref 37–51)
HDLC SERPL-MCNC: 73 MG/DL (ref 35–130)
HGB BLD-MCNC: 13.2 G/DL (ref 12–18)
HGB UR QL STRIP: NEGATIVE
KETONES UR QL STRIP.AUTO: NEGATIVE
LDL/HDL RATIO,LDHD: 1 RATIO
LDLC SERPL CALC-MCNC: 79 MG/DL (ref 0–130)
LEUKOCYTE ESTERASE: NEGATIVE
LYMPHOCYTES ABSOLUTE: 3.2 K/UL (ref 0.6–4.1)
LYMPHOCYTES NFR BLD: 36 % (ref 10–58.5)
MCH RBC QN AUTO: 32.3 PG (ref 26–32)
MCHC RBC AUTO-ENTMCNC: 33.8 G/DL (ref 30–36)
MCV RBC AUTO: 95.7 FL (ref 80–97)
MICROALBUMIN, URINE: 20 MG/L (ref 0–20)
MONOCYTES ABS-DIF,2141: 0.7 K/UL (ref 0–1.8)
MONOCYTES NFR BLD: 8 % (ref 0.1–24)
NEUTROPHILS # BLD: 56 % (ref 37–92)
NEUTROPHILS ABS,2156: 4.9 K/UL (ref 2–7.8)
NITRITE UR QL STRIP.AUTO: NEGATIVE
PH UR STRIP: 5 [PH] (ref 5–7)
PLATELET # BLD AUTO: 306 K/UL (ref 140–440)
POTASSIUM SERPL-SCNC: 3.9 MMOL/L (ref 3.6–5)
PROT SERPL-MCNC: 7.2 G/DL (ref 6.3–8.2)
PROT UR STRIP-MCNC: ABNORMAL MG/DL
RBC # BLD AUTO: 4.09 M/UL (ref 4.2–6.3)
RBC #/AREA URNS HPF: 0 #/HPF
SODIUM SERPL-SCNC: 144 MMOL/L (ref 137–145)
SP GR UR REFRACTOMETRY: 1.02 (ref 1–1.03)
TRIGL SERPL-MCNC: 203 MG/DL (ref 0–200)
UROBILINOGEN UR QL STRIP.AUTO: NEGATIVE
VLDLC SERPL CALC-MCNC: 41 MG/DL
WBC # BLD AUTO: 8.8 K/UL (ref 4.1–10.9)
WBC URNS QL MICRO: ABNORMAL #/HPF

## 2020-11-04 PROCEDURE — 1090F PRES/ABSN URINE INCON ASSESS: CPT | Performed by: INTERNAL MEDICINE

## 2020-11-04 PROCEDURE — 3044F HG A1C LEVEL LT 7.0%: CPT | Performed by: INTERNAL MEDICINE

## 2020-11-04 PROCEDURE — 81003 URINALYSIS AUTO W/O SCOPE: CPT | Performed by: INTERNAL MEDICINE

## 2020-11-04 PROCEDURE — 3017F COLORECTAL CA SCREEN DOC REV: CPT | Performed by: INTERNAL MEDICINE

## 2020-11-04 PROCEDURE — 84443 ASSAY THYROID STIM HORMONE: CPT | Performed by: INTERNAL MEDICINE

## 2020-11-04 PROCEDURE — 90694 VACC AIIV4 NO PRSRV 0.5ML IM: CPT

## 2020-11-04 PROCEDURE — G8536 NO DOC ELDER MAL SCRN: HCPCS | Performed by: INTERNAL MEDICINE

## 2020-11-04 PROCEDURE — G0008 ADMIN INFLUENZA VIRUS VAC: HCPCS | Performed by: INTERNAL MEDICINE

## 2020-11-04 PROCEDURE — 84439 ASSAY OF FREE THYROXINE: CPT | Performed by: INTERNAL MEDICINE

## 2020-11-04 PROCEDURE — G8399 PT W/DXA RESULTS DOCUMENT: HCPCS | Performed by: INTERNAL MEDICINE

## 2020-11-04 PROCEDURE — 1101F PT FALLS ASSESS-DOCD LE1/YR: CPT | Performed by: INTERNAL MEDICINE

## 2020-11-04 PROCEDURE — 85025 COMPLETE CBC W/AUTO DIFF WBC: CPT | Performed by: INTERNAL MEDICINE

## 2020-11-04 PROCEDURE — 82044 UR ALBUMIN SEMIQUANTITATIVE: CPT | Performed by: INTERNAL MEDICINE

## 2020-11-04 PROCEDURE — G8752 SYS BP LESS 140: HCPCS | Performed by: INTERNAL MEDICINE

## 2020-11-04 PROCEDURE — G9899 SCRN MAM PERF RSLTS DOC: HCPCS | Performed by: INTERNAL MEDICINE

## 2020-11-04 PROCEDURE — G8754 DIAS BP LESS 90: HCPCS | Performed by: INTERNAL MEDICINE

## 2020-11-04 PROCEDURE — 99214 OFFICE O/P EST MOD 30 MIN: CPT | Performed by: INTERNAL MEDICINE

## 2020-11-04 PROCEDURE — 80061 LIPID PANEL: CPT | Performed by: INTERNAL MEDICINE

## 2020-11-04 PROCEDURE — G0439 PPPS, SUBSEQ VISIT: HCPCS | Performed by: INTERNAL MEDICINE

## 2020-11-04 PROCEDURE — G8510 SCR DEP NEG, NO PLAN REQD: HCPCS | Performed by: INTERNAL MEDICINE

## 2020-11-04 PROCEDURE — 82550 ASSAY OF CK (CPK): CPT | Performed by: INTERNAL MEDICINE

## 2020-11-04 PROCEDURE — 83036 HEMOGLOBIN GLYCOSYLATED A1C: CPT | Performed by: INTERNAL MEDICINE

## 2020-11-04 PROCEDURE — 2022F DILAT RTA XM EVC RTNOPTHY: CPT | Performed by: INTERNAL MEDICINE

## 2020-11-04 PROCEDURE — G8427 DOCREV CUR MEDS BY ELIG CLIN: HCPCS | Performed by: INTERNAL MEDICINE

## 2020-11-04 PROCEDURE — G8419 CALC BMI OUT NRM PARAM NOF/U: HCPCS | Performed by: INTERNAL MEDICINE

## 2020-11-04 PROCEDURE — 80053 COMPREHEN METABOLIC PANEL: CPT | Performed by: INTERNAL MEDICINE

## 2020-11-04 NOTE — PROGRESS NOTES
After obtaining consent and per verbal order from Dr. Eboni Berry, patient received influenza vaccine given by Marleni Reed and verified by Mateo Miller. Fluad Influenza 0.5ml was given IM in left deltoid. Patient tolerated injection and was observed for 10 minutes post injection. VIS was given.

## 2020-11-04 NOTE — PROGRESS NOTES
Bradford Barboza  Identified pt with two pt identifiers(name and ). Chief Complaint   Patient presents with   [address] [place] [address] Annual Wellness Visit       1. Have you been to the ER, urgent care clinic since your last visit? Hospitalized since your last visit? no    2. Have you seen or consulted any other health care providers outside of the [address] [place] [address] since your last visit? Include any pap smears or colon screening. no      Health Maintenance Topics with due status: Overdue       Topic Date Due    DTaP/Tdap/Td series 1966    Flu Vaccine 2020    Shingrix Vaccine Age 50> 10/28/2020    MICROALBUMIN Q1 10/31/2020     Health Maintenance Topics with due status: Due On       Topic Date Due    Medicare Yearly Exam 10/31/2020     Health Maintenance Topics with due status: Not Due       Topic Last Completion Date    Colorectal Cancer Screening Combo 2018    Foot Exam Q1 2020    GLAUCOMA SCREENING Q2Y 2020    Eye Exam Retinal or Dilated 2020    A1C test (Diabetic or Prediabetic) 2020    Lipid Screen 2020    Breast Cancer Screen Mammogram 2020     Health Maintenance Topics with due status: Completed       Topic Last Completion Date    Bone Densitometry (Dexa) Screening 10/01/2015    Pneumococcal 65+ years 10/01/2015     Health Maintenance Topics with due status: Addressed       Topic Date Due    Hepatitis C Screening Addressed           Medication reconciliation up to date and corrected with patient at this time. Today's provider has been notified of reason for visit, vitals and flowsheets obtained on patients. Reviewed record in preparation for visit, huddled with provider and have obtained necessary documentation.         Wt Readings from Last 3 Encounters:   20 179 lb (81.2 kg)   20 189 lb 4.8 oz (85.9 kg)   20 189 lb (85.7 kg)     Temp Readings from Last 3 Encounters:   20 97.6 °F (36.4 °C) (Oral)   20 98 °F (36.7 °C) (Oral) 02/12/20 98.6 °F (37 °C) (Oral)     BP Readings from Last 3 Encounters:   08/04/20 118/78   05/04/20 126/84   03/09/20 136/80     Pulse Readings from Last 3 Encounters:   08/04/20 88   05/04/20 (!) 102   03/09/20 95     There were no vitals filed for this visit. Learning Assessment:  :     Learning Assessment 8/28/2017   PRIMARY LEARNER Patient   HIGHEST LEVEL OF EDUCATION - PRIMARY LEARNER  SOME COLLEGE   PRIMARY LANGUAGE ENGLISH   LEARNER PREFERENCE PRIMARY LISTENING     READING   ANSWERED BY patient   RELATIONSHIP SELF       Depression Screening:  :     3 most recent PHQ Screens 11/4/2020   Little interest or pleasure in doing things Not at all   Feeling down, depressed, irritable, or hopeless Not at all   Total Score PHQ 2 0       No flowsheet data found. Fall Risk Assessment:  :     Fall Risk Assessment, last 12 mths 11/4/2020   Able to walk? Yes   Fall in past 12 months? No       Abuse Screening:  :     Abuse Screening Questionnaire 11/4/2020 10/31/2019 11/13/2018 8/28/2017   Do you ever feel afraid of your partner? N N N N   Are you in a relationship with someone who physically or mentally threatens you? N N N N   Is it safe for you to go home?  Y Y Y Y       ADL Screening:  :     ADL Assessment 11/4/2020   Feeding yourself No Help Needed   Getting from bed to chair No Help Needed   Getting dressed No Help Needed   Bathing or showering No Help Needed   Walk across the room (includes cane/walker) No Help Needed   Using the telphone No Help Needed   Taking your medications No Help Needed   Preparing meals No Help Needed   Managing money (expenses/bills) No Help Needed   Moderately strenuous housework (laundry) No Help Needed   Shopping for personal items (toiletries/medicines) No Help Needed   Shopping for groceries No Help Needed   Driving No Help Needed   Climbing a flight of stairs No Help Needed   Getting to places beyond walking distances No Help Needed

## 2020-11-05 LAB
T4 FREE SERPL-MCNC: 1.16 NG/DL (ref 0.58–2.3)
TSH SERPL DL<=0.05 MIU/L-ACNC: 3.02 UIU/ML (ref 0.34–5.6)

## 2020-11-06 LAB — BACTERIA UR CULT: NORMAL

## 2020-11-09 RX ORDER — METFORMIN HYDROCHLORIDE 500 MG/1
TABLET, EXTENDED RELEASE ORAL
Qty: 180 TAB | Refills: 3 | Status: SHIPPED | OUTPATIENT
Start: 2020-11-09 | End: 2021-08-10 | Stop reason: SDUPTHER

## 2020-11-09 NOTE — TELEPHONE ENCOUNTER
RX refill request from the patient/pharmacy. Patient last seen 11- with labs, and next appt. scheduled for 02-  Requested Prescriptions     Pending Prescriptions Disp Refills    metFORMIN ER (GLUCOPHAGE XR) 500 mg tablet [Pharmacy Med Name: METFORMIN 500MG ER] 180 Tab 3     Sig: TAKE 1 TABLET BY MOUTH TWICE A DAY WITH MEALS   .

## 2021-01-12 ENCOUNTER — OFFICE VISIT (OUTPATIENT)
Dept: INTERNAL MEDICINE CLINIC | Age: 76
End: 2021-01-12
Payer: MEDICARE

## 2021-01-12 VITALS
HEIGHT: 67 IN | RESPIRATION RATE: 17 BRPM | DIASTOLIC BLOOD PRESSURE: 88 MMHG | BODY MASS INDEX: 29 KG/M2 | OXYGEN SATURATION: 97 % | SYSTOLIC BLOOD PRESSURE: 138 MMHG | TEMPERATURE: 97.6 F | WEIGHT: 184.8 LBS | HEART RATE: 92 BPM

## 2021-01-12 DIAGNOSIS — R09.1 PLEURISY: Primary | ICD-10-CM

## 2021-01-12 PROCEDURE — G8536 NO DOC ELDER MAL SCRN: HCPCS | Performed by: INTERNAL MEDICINE

## 2021-01-12 PROCEDURE — G8427 DOCREV CUR MEDS BY ELIG CLIN: HCPCS | Performed by: INTERNAL MEDICINE

## 2021-01-12 PROCEDURE — 1090F PRES/ABSN URINE INCON ASSESS: CPT | Performed by: INTERNAL MEDICINE

## 2021-01-12 PROCEDURE — 99213 OFFICE O/P EST LOW 20 MIN: CPT | Performed by: INTERNAL MEDICINE

## 2021-01-12 PROCEDURE — G8510 SCR DEP NEG, NO PLAN REQD: HCPCS | Performed by: INTERNAL MEDICINE

## 2021-01-12 PROCEDURE — G8419 CALC BMI OUT NRM PARAM NOF/U: HCPCS | Performed by: INTERNAL MEDICINE

## 2021-01-12 PROCEDURE — G8752 SYS BP LESS 140: HCPCS | Performed by: INTERNAL MEDICINE

## 2021-01-12 PROCEDURE — 1101F PT FALLS ASSESS-DOCD LE1/YR: CPT | Performed by: INTERNAL MEDICINE

## 2021-01-12 PROCEDURE — G9899 SCRN MAM PERF RSLTS DOC: HCPCS | Performed by: INTERNAL MEDICINE

## 2021-01-12 PROCEDURE — G8754 DIAS BP LESS 90: HCPCS | Performed by: INTERNAL MEDICINE

## 2021-01-12 PROCEDURE — G8399 PT W/DXA RESULTS DOCUMENT: HCPCS | Performed by: INTERNAL MEDICINE

## 2021-01-12 PROCEDURE — 3017F COLORECTAL CA SCREEN DOC REV: CPT | Performed by: INTERNAL MEDICINE

## 2021-01-12 RX ORDER — PREDNISONE 5 MG/1
TABLET ORAL
Qty: 48 TAB | Refills: 0 | Status: SHIPPED | OUTPATIENT
Start: 2021-01-12 | End: 2021-02-04 | Stop reason: ALTCHOICE

## 2021-01-12 NOTE — PATIENT INSTRUCTIONS
Bronchitis: Care Instructions Your Care Instructions Bronchitis is inflammation of the bronchial tubes, which carry air to the lungs. The tubes swell and produce mucus, or phlegm. The mucus and inflamed bronchial tubes make you cough. You may have trouble breathing. Most cases of bronchitis are caused by viruses like those that cause colds. Antibiotics usually do not help and they may be harmful. Bronchitis usually develops rapidly and lasts about 2 to 3 weeks in otherwise healthy people. Follow-up care is a key part of your treatment and safety. Be sure to make and go to all appointments, and call your doctor if you are having problems. It's also a good idea to know your test results and keep a list of the medicines you take. How can you care for yourself at home? · Take all medicines exactly as prescribed. Call your doctor if you think you are having a problem with your medicine. · Get some extra rest. 
· Take an over-the-counter pain medicine, such as acetaminophen (Tylenol), ibuprofen (Advil, Motrin), or naproxen (Aleve) to reduce fever and relieve body aches. Read and follow all instructions on the label. · Do not take two or more pain medicines at the same time unless the doctor told you to. Many pain medicines have acetaminophen, which is Tylenol. Too much acetaminophen (Tylenol) can be harmful. · Take an over-the-counter cough medicine that contains dextromethorphan to help quiet a dry, hacking cough so that you can sleep. Avoid cough medicines that have more than one active ingredient. Read and follow all instructions on the label. · Breathe moist air from a humidifier, hot shower, or sink filled with hot water. The heat and moisture will thin mucus so you can cough it out. · Do not smoke. Smoking can make bronchitis worse. If you need help quitting, talk to your doctor about stop-smoking programs and medicines. These can increase your chances of quitting for good. When should you call for help? Call 911 anytime you think you may need emergency care. For example, call if: 
  · You have severe trouble breathing. Call your doctor now or seek immediate medical care if: 
  · You have new or worse trouble breathing.  
  · You cough up dark brown or bloody mucus (sputum).  
  · You have a new or higher fever.  
  · You have a new rash. Watch closely for changes in your health, and be sure to contact your doctor if: 
  · You cough more deeply or more often, especially if you notice more mucus or a change in the color of your mucus.  
  · You are not getting better as expected. Where can you learn more? Go to http://www.gray.com/ Enter H333 in the search box to learn more about \"Bronchitis: Care Instructions. \" Current as of: February 24, 2020               Content Version: 12.6 © 3590-1550 [employer]. Care instructions adapted under license by [employer] (which disclaims liability or warranty for this information). If you have questions about a medical condition or this instruction, always ask your healthcare professional. [address] any warranty or liability for your use of this information.

## 2021-01-12 NOTE — PROGRESS NOTES
Subjective:   Mira Dixon is a 77 y.o. female      Chief Complaint   Patient presents with    Back Pain     x 2 days    Cough        History of present illness: She presents today complaints of right-sided chest pain that hurts to take a deep breath and cough which she has had before and been found to be pleurisy. Has responded to steroid Dosepaks in the past and it seems to be the exact same type of pain this time. She notes no history of falls or trauma. She denies any chest discomfort other than that and denies any cough or shortness of breath. She notes no fevers or chills.     Patient Active Problem List   Diagnosis Code    SBO (small bowel obstruction) ([facility] K56.609    Crohn disease ([facility] K50.90    Essential hypertension I10    Controlled type 2 diabetes mellitus with stage 2 chronic kidney disease, without long-term current use of insulin ([facility]) E11.22, N18.2    Mixed hyperlipidemia E78.2    Primary osteoarthritis involving multiple joints M89.49    PAF (paroxysmal atrial fibrillation) ([facility]) I48.0    Post-menopausal Z78.0    Acquired hypothyroidism E03.9    Stage 2 chronic kidney disease N18.2    Medicare annual wellness visit, subsequent Z00.00    Colon cancer screening Z12.11    Acute bronchitis J20.9    Acute non-recurrent maxillary sinusitis J01.00    Chest pain on breathing R07.1    Pleurisy R09.1    Alcohol screening Z13.39    Edema R60.9      Past Medical History:   Diagnosis Date    Acquired hypothyroidism 7/26/2017    CKD (chronic kidney disease) 7/26/2017    Crohn's disease ([facility]     Diabetes ([facility]     Hypertension     On statin therapy 7/26/2017    Pleurisy 07/17/2019    Post-menopausal 7/26/2017      Allergies   Allergen Reactions    Lisinopril Cough      Family History   Problem Relation Age of Onset    Diabetes Mother     Heart Failure Mother     Cancer Father         stomach      Social History     Socioeconomic History    Marital status:  Spouse name: Not on file    Number of children: Not on file    Years of education: Not on file    Highest education level: Not on file   Occupational History    Not on file   Social Needs    Financial resource strain: Not on file    Food insecurity     Worry: Not on file     Inability: Not on file    Transportation needs     Medical: Not on file     Non-medical: Not on file   Tobacco Use    Smoking status: Former Smoker     Packs/day: 1.00     Years: 25.00     Pack years: 25.00     Quit date:      Years since quittin.0    Smokeless tobacco: Never Used   Substance and Sexual Activity    Alcohol use: Yes     Comment: 2 drinks per month     Drug use: No    Sexual activity: Never   Lifestyle    Physical activity     Days per week: Not on file     Minutes per session: Not on file    Stress: Not on file   Relationships    Social connections     Talks on phone: Not on file     Gets together: Not on file     Attends [employer] service: Not on file     Active member of club or organization: Not on file     Attends meetings of clubs or organizations: Not on file     Relationship status: Not on file    Intimate partner violence     Fear of current or ex partner: Not on file     Emotionally abused: Not on file     Physically abused: Not on file     Forced sexual activity: Not on file   Other Topics Concern    Not on file   Social History Narrative    Not on file     Prior to Admission medications    Medication Sig Start Date End Date Taking?  Authorizing Provider   predniSONE (STERAPRED) 5 mg dose pack See administration instruction per 5mg dose pack 21  Yes Amy Farr MD   metFORMIN ER (GLUCOPHAGE XR) 500 mg tablet TAKE 1 TABLET BY MOUTH TWICE A DAY WITH MEALS 20  Yes Amy Farr MD   hydroCHLOROthiazide (HYDRODIURIL) 25 mg tablet TAKE 1 TABLET BY MOUTH EVERY DAY 10/26/20  Yes Amy Farr MD   amLODIPine (NORVASC) 2.5 mg tablet TAKE 1 TABLET EVERY DAY 20  Yes Rohan Ness MD   nateglinide (STARLIX) 120 mg tablet TAKE 1 TABLET THREE TIMES DAILY BEFORE MEALS 7/20/20  Yes Rohan Ness MD   diclofenac EC (VOLTAREN) 75 mg EC tablet TAKE 1 TABLET TWICE A DAY WITH FOOD FOR PAIN AND INFLAMMATION 4/22/20  Yes Rohan Ness MD   levothyroxine (SYNTHROID) 50 mcg tablet TAKE 1 TABLET EVERY DAY 2/24/20  Yes Rohan Ness MD   GUAIFENESIN/PSEUDOEPHEDRNE HCL [facility] D PO) Take  by mouth as needed. Yes Provider, Historical   MULTIVIT,TH IRON,OTHER MIN (COMPLETE MULTIVITAMIN PO) Take 1 Tab by mouth daily. Indications: Complete multivitamin Women's 50+   Yes Other, MD Cordelia   calcium-cholecalciferol, D3, (CALTRATE 600+D) tablet Take 1 Tab by mouth daily. Yes Other, MD Cordelia        Review of Systems              Constitutional:  She denies fever, weight loss, sweats or fatigue. EYES: No blurred or double vision,               ENT: no nasal congestion, no headache or dizziness. No difficulty with               swallowing, taste, speech or smell. Respiratory:  No cough, wheezing or shortness of breath. No sputum production. Right-sided chest pain that hurts with coughing and taking a deep breath  Cardiac:  Denies chest pain, palpitations, unexplained indigestion, syncope, edema, PND or orthopnea. GI:  No changes in bowel movements, no abdominal pain, no bloating, anorexia, nausea, vomiting or heartburn. :  No frequency or dysuria. Denies incontinence or sexual dysfunction. Extremities:  No joint pain, stiffness or swelling  Back:.no pain or soreness  Skin:  No recent rashes or mole changes. Neurological:  No numbness, tingling, burning paresthesias or loss of motor strength. No syncope, dizziness, frequent headaches or memory loss.   Hematologic:  No easy bruising  Lymphatic: No lymph node enlargement    Objective:     Vitals:    01/12/21 0947 01/12/21 1015   BP: (!) 148/82 138/88   Pulse: 92    Resp: 17    Temp: 97.6 °F (36.4 °C) TempSrc: Oral    SpO2: 97%    Weight: 184 lb 12.8 oz (83.8 kg)    Height: 5' 7\" (1.702 m)    PainSc:   8    PainLoc: Back        Body mass index is 28.94 kg/m². Physical Examination:              General Appearance:  Well-developed, well-nourished, no acute distress. HEENT:      Ears:  The TMs and ear canals were clear. Eyes:  The pupillary responses were normal.  Extraocular muscle function intact. Lids and conjunctiva not injected. Funduscopic exam revealed sharp disc margins. Nares: Clear w/o edema or erythema  Pharynx:  Clear with teeth in good repair. No masses were noted. Neck:  Supple without thyromegaly or adenopathy. No JVD noted. No carotid                bruits. Lungs:  Clear to auscultation and percussion. Cardiac:  Regular rate and rhythm without murmur. PMI not displaced. No gallop, rub or click. Abdominal: Soft, non-tender, no hepata-spleenomegally or masses  Extremities:  No clubbing, cyanosis or edema. Skin:  No rash or unusual mole changes noted. Lymph Nodes:  None felt in the cervical, supraclavicular, axillary or inguinal region. Neurological: . DTRs 2+ and symmetric. Station and gait normal.   Hematologic:   No purpura or petechiae        Assessment/Plan:         1. Pleurisy        Impressions/Plan:  Impression  1. Pleurisy symptomatic as she has had before so we did not repeat a chest x-ray this time. I will treat her with prednisone Dosepak. If not resolved she is to return for further evaluation and understands that. Orders Placed This Encounter    predniSONE (STERAPRED) 5 mg dose pack       Follow-up and Dispositions    · Return At prior scheduled appointment. No results found for any visits on 01/12/21. Mariajose Morris MD    The patient was given after the visit summary the patient verbalized an understanding of the plans and problems as explained.

## 2021-01-12 NOTE — PROGRESS NOTES
Chief Complaint   Patient presents with    Back Pain     x 2 days    Cough     Visit Vitals  BP (!) 148/82 (BP 1 Location: Left arm, BP Patient Position: Sitting)   Pulse 92   Temp 97.6 °F (36.4 °C) (Oral)   Resp 17   Ht 5' 7\" (1.702 m)   Wt 184 lb 12.8 oz (83.8 kg)   SpO2 97%   BMI 28.94 kg/m²     1. Have you been to the ER, urgent care clinic since your last visit? Hospitalized since your last visit? No    2. Have you seen or consulted any other health care providers outside of the [address] [place] [address] since your last visit? Include any pap smears or colon screening.  No

## 2021-02-03 NOTE — PROGRESS NOTES
Chief Complaint   Patient presents with    Hypertension     3 month follow up    Diabetes    Cholesterol Problem       SUBJECTIVE:    Jon Monday is a 77 y.o. female who returns in follow-up for medical problems include hypertension, diabetes, hyperlipidemia, Crohn's disease, history of small bowel obstruction from that, paroxysmal atrial fibrillation, CKD stage II, DJD and other medical problems. She is taking her medications and trying to follow her diet and remain physically active. She probably has not been doing the best job with diet however. She denies any chest pain, shortness of breath, palpitations, PND, orthopnea or other cardiac or respiratory complaints. She notes no GI or  complaints. She notes no headaches, dizziness or neurologic complaints. There are no current active arthritic complaints and there are no other complaints on complete review of systems. Current Outpatient Medications   Medication Sig Dispense Refill    metFORMIN ER (GLUCOPHAGE XR) 500 mg tablet TAKE 1 TABLET BY MOUTH TWICE A DAY WITH MEALS 180 Tab 3    hydroCHLOROthiazide (HYDRODIURIL) 25 mg tablet TAKE 1 TABLET BY MOUTH EVERY DAY 30 Tab 11    amLODIPine (NORVASC) 2.5 mg tablet TAKE 1 TABLET EVERY DAY 30 Tab 11    nateglinide (STARLIX) 120 mg tablet TAKE 1 TABLET THREE TIMES DAILY BEFORE MEALS 90 Tab 11    diclofenac EC (VOLTAREN) 75 mg EC tablet TAKE 1 TABLET TWICE A DAY WITH FOOD FOR PAIN AND INFLAMMATION 60 Tab 10    levothyroxine (SYNTHROID) 50 mcg tablet TAKE 1 TABLET EVERY DAY 30 Tab 11    GUAIFENESIN/PSEUDOEPHEDRNE HCL (MUCINEX D PO) Take  by mouth as needed.  MULTIVIT,TH IRON,OTHER MIN (COMPLETE MULTIVITAMIN PO) Take 1 Tab by mouth daily. Indications: Complete multivitamin Women's 50+      calcium-cholecalciferol, D3, (CALTRATE 600+D) tablet Take 1 Tab by mouth daily.        Past Medical History:   Diagnosis Date    Acquired hypothyroidism 7/26/2017    CKD (chronic kidney disease) 7/26/2017    [facility]     Diabetes ([facility]     Hypertension     On statin therapy 7/26/2017    Pleurisy 07/17/2019    Post-menopausal 7/26/2017     Past Surgical History:   Procedure Laterality Date    COLONOSCOPY N/A 5/1/2018    COLONOSCOPY performed by Jenna Vel[facility]quez MD at [facility] ENDOSCOPY    COLONOSCOPY,DIAGNOSTIC  5/1/2018         HX BREAST BIOPSY Right     over 30 years  negative    HX GI      tumor from colon removed    HX ORTHOPAEDIC      right wrist    HX OTHER SURGICAL      Bowel Obstruction    IA BREAST SURGERY PROCEDURE UNLISTED      cyst removed right    IA COLONOSCOPY W/BIOPSY SINGLE/MULTIPLE  4/9/2014          Allergies   Allergen Reactions    Lisinopril Cough       REVIEW OF SYSTEMS:  General: negative for - chills or fever, or weight loss or gain  ENT: negative for - headaches, nasal congestion or tinnitus  Eyes: no blurred or visual changes  Neck: No stiffness or swollen nodes  Respiratory: negative for - cough, hemoptysis, shortness of breath or wheezing  Cardiovascular : negative for - chest pain, edema, palpitations or shortness of breath  Gastrointestinal: negative for - abdominal pain, blood in stools, heartburn or nausea/vomiting  Genito-Urinary: no dysuria, trouble voiding, or hematuria  Musculoskeletal: negative for - gait disturbance, joint pain, joint stiffness or joint swelling  Neurological: no TIA or stroke symptoms  Hematologic: no bruises, no bleeding  Lymphatic: no swollen glands  Integument: no lumps, mole changes, nail changes or rash  Endocrine:no malaise/lethargy poly uria or polydipsia or unexpected weight changes        Social History     Socioeconomic History    Marital status:      Spouse name: Not on file    Number of children: Not on file    Years of education: Not on file    Highest education level: Not on file   Tobacco Use    Smoking status: Former Smoker     Packs/day: 1.00     Years: 25.00     Pack years: 25.00     Quit date: 5     Years since quittin.1    Smokeless tobacco: Never Used   Substance and Sexual Activity    Alcohol use: Yes     Comment: 2 drinks per month     Drug use: No    Sexual activity: Never     Family History   Problem Relation Age of Onset    Diabetes Mother     Heart Failure Mother     Cancer Father         stomach       OBJECTIVE:     Visit Vitals  /74   Pulse 94   Temp 98 °F (36.7 °C) (Temporal)   Resp 17   Ht 5' 7\" (1.702 m)   Wt 185 lb 12.8 oz (84.3 kg)   SpO2 99%   BMI 29.10 kg/m²     CONSTITUTIONAL:   well nourished, appears age appropriate  EYES: sclera anicteric, PERRL, EOMI  ENMT:nares clear, moist mucous membranes, pharynx clear  NECK: supple. Thyroid normal, No JVD or bruits  RESPIRATORY: Chest: clear to ascultation and percussion, normal inspiratory effort  CARDIOVASCULAR: Heart: regular rate and rhythm no murmurs, rubs or gallops, PMI not displaced, No thrills, no peripheral edema  GASTROINTESTINAL: Abdomen: non distended, soft, non tender, bowel sounds normal  HEMATOLOGIC: no purpura, petechiae or bruising  LYMPHATIC: No lymph node enlargemant  MUSCULOSKELETAL: Extremities: no active synovitis, pulse 1+. No diabetic foot changes noted. INTEGUMENT: No unusual rashes or suspicious skin lesions noted. Nails appear normal.  PERIPHERAL VASCULAR: normal pulses femoral, PT and DP  NEUROLOGIC: non-focal exam, A & O X 3. Normal distal sensation and proprioception all toes both feet. PSYCHIATRIC:, appropriate affect     ASSESSMENT:   1. Essential hypertension    2. Controlled type 2 diabetes mellitus with stage 2 chronic kidney disease, without long-term current use of insulin ([address]    3. Mixed hyperlipidemia    4. PAF (paroxysmal atrial fibrillation) ([address]    5. Primary osteoarthritis involving multiple joints    6. Stage 2 chronic kidney disease    7. Crohn's disease of both small and large intestine without complication ([address]    8.  SBO (small bowel obstruction) ([address] Impression  1. Hypertension that is controlled so continue current therapy reviewed with her. 2.  Diabetes repeat status pending and prior lab reviewed now make adjustments if necessary. 3.  Hyperlipidemia prior lab reviewed repeat status pending and I will adjust if needed. 4.  Paroxysmal atrial fibrillation stable  5. DJD that is stable  6. CKD stage II repeat status pending next #7 Crohn's disease stable  8. History of small bowel obstruction no evidence of recurrence  I will call with lab and make further recommendations or adjustments if necessary. Follow-up in 3 months or sooner if there is a problem. PLAN:  .  Orders Placed This Encounter    METABOLIC PANEL, COMPREHENSIVE ([facility])    LIPID PANEL ([facility])    CK ([facility])    HEMOGLOBIN A1C W/O EAG ([facility])         ATTENTION:   This medical record was transcribed using an electronic medical records system. Although proofread, it may and can contain electronic and spelling errors. Other human spelling and other errors may be present. Corrections may be executed at a later time. Please feel free to contact us for any clarifications as needed. Follow-up and Dispositions    · Return in about 3 months (around 5/4/2021). No results found for any visits on 02/04/21. April Henderson MD    The patient verbalized understanding of the problems and plans as explained.

## 2021-02-04 ENCOUNTER — OFFICE VISIT (OUTPATIENT)
Dept: INTERNAL MEDICINE CLINIC | Age: 76
End: 2021-02-04
Payer: MEDICARE

## 2021-02-04 VITALS
DIASTOLIC BLOOD PRESSURE: 74 MMHG | SYSTOLIC BLOOD PRESSURE: 132 MMHG | BODY MASS INDEX: 29.16 KG/M2 | RESPIRATION RATE: 17 BRPM | HEART RATE: 94 BPM | TEMPERATURE: 98 F | WEIGHT: 185.8 LBS | OXYGEN SATURATION: 99 % | HEIGHT: 67 IN

## 2021-02-04 DIAGNOSIS — N18.2 CONTROLLED TYPE 2 DIABETES MELLITUS WITH STAGE 2 CHRONIC KIDNEY DISEASE, WITHOUT LONG-TERM CURRENT USE OF INSULIN (HCC): ICD-10-CM

## 2021-02-04 DIAGNOSIS — N18.2 STAGE 2 CHRONIC KIDNEY DISEASE: ICD-10-CM

## 2021-02-04 DIAGNOSIS — K56.609 SBO (SMALL BOWEL OBSTRUCTION) (HCC): ICD-10-CM

## 2021-02-04 DIAGNOSIS — I10 ESSENTIAL HYPERTENSION: Primary | ICD-10-CM

## 2021-02-04 DIAGNOSIS — E78.2 MIXED HYPERLIPIDEMIA: ICD-10-CM

## 2021-02-04 DIAGNOSIS — M15.9 PRIMARY OSTEOARTHRITIS INVOLVING MULTIPLE JOINTS: ICD-10-CM

## 2021-02-04 DIAGNOSIS — I48.0 PAF (PAROXYSMAL ATRIAL FIBRILLATION) (HCC): ICD-10-CM

## 2021-02-04 DIAGNOSIS — E11.22 CONTROLLED TYPE 2 DIABETES MELLITUS WITH STAGE 2 CHRONIC KIDNEY DISEASE, WITHOUT LONG-TERM CURRENT USE OF INSULIN (HCC): ICD-10-CM

## 2021-02-04 DIAGNOSIS — K50.80 CROHN'S DISEASE OF BOTH SMALL AND LARGE INTESTINE WITHOUT COMPLICATION (HCC): ICD-10-CM

## 2021-02-04 LAB
A-G RATIO,AGRAT: 1.8 RATIO
ALBUMIN SERPL-MCNC: 4.2 G/DL (ref 3.9–5.4)
ALP SERPL-CCNC: 76 U/L (ref 38–126)
ALT SERPL-CCNC: 31 U/L (ref 0–35)
ANION GAP SERPL CALC-SCNC: 7 MMOL/L
AST SERPL W P-5'-P-CCNC: 35 U/L (ref 14–36)
BILIRUB SERPL-MCNC: 0.7 MG/DL (ref 0.2–1.3)
BUN SERPL-MCNC: 19 MG/DL (ref 7–17)
BUN/CREATININE RATIO,BUCR: 27 RATIO
CALCIUM SERPL-MCNC: 9.4 MG/DL (ref 8.4–10.2)
CHLORIDE SERPL-SCNC: 103 MMOL/L (ref 98–107)
CHOL/HDL RATIO,CHHD: 3 RATIO (ref 0–4)
CHOLEST SERPL-MCNC: 220 MG/DL (ref 0–200)
CK SERPL-CCNC: 272 U/L (ref 30–135)
CO2 SERPL-SCNC: 32 MMOL/L (ref 22–32)
CREAT SERPL-MCNC: 0.7 MG/DL (ref 0.7–1.2)
GLOBULIN,GLOB: 2.4
GLUCOSE SERPL-MCNC: 118 MG/DL (ref 65–105)
HBA1C MFR BLD HPLC: 6.2 % (ref 4–5.7)
HDLC SERPL-MCNC: 84 MG/DL (ref 35–130)
LDL/HDL RATIO,LDHD: 1 RATIO
LDLC SERPL CALC-MCNC: 86 MG/DL (ref 0–130)
POTASSIUM SERPL-SCNC: 4.4 MMOL/L (ref 3.6–5)
PROT SERPL-MCNC: 6.6 G/DL (ref 6.3–8.2)
SODIUM SERPL-SCNC: 142 MMOL/L (ref 137–145)
TRIGL SERPL-MCNC: 249 MG/DL (ref 0–200)
VLDLC SERPL CALC-MCNC: 50 MG/DL

## 2021-02-04 PROCEDURE — 1101F PT FALLS ASSESS-DOCD LE1/YR: CPT | Performed by: INTERNAL MEDICINE

## 2021-02-04 PROCEDURE — 3046F HEMOGLOBIN A1C LEVEL >9.0%: CPT | Performed by: INTERNAL MEDICINE

## 2021-02-04 PROCEDURE — G8510 SCR DEP NEG, NO PLAN REQD: HCPCS | Performed by: INTERNAL MEDICINE

## 2021-02-04 PROCEDURE — G8752 SYS BP LESS 140: HCPCS | Performed by: INTERNAL MEDICINE

## 2021-02-04 PROCEDURE — 1090F PRES/ABSN URINE INCON ASSESS: CPT | Performed by: INTERNAL MEDICINE

## 2021-02-04 PROCEDURE — G8399 PT W/DXA RESULTS DOCUMENT: HCPCS | Performed by: INTERNAL MEDICINE

## 2021-02-04 PROCEDURE — 80061 LIPID PANEL: CPT | Performed by: INTERNAL MEDICINE

## 2021-02-04 PROCEDURE — 3017F COLORECTAL CA SCREEN DOC REV: CPT | Performed by: INTERNAL MEDICINE

## 2021-02-04 PROCEDURE — 82550 ASSAY OF CK (CPK): CPT | Performed by: INTERNAL MEDICINE

## 2021-02-04 PROCEDURE — 2022F DILAT RTA XM EVC RTNOPTHY: CPT | Performed by: INTERNAL MEDICINE

## 2021-02-04 PROCEDURE — G8427 DOCREV CUR MEDS BY ELIG CLIN: HCPCS | Performed by: INTERNAL MEDICINE

## 2021-02-04 PROCEDURE — G8536 NO DOC ELDER MAL SCRN: HCPCS | Performed by: INTERNAL MEDICINE

## 2021-02-04 PROCEDURE — G9899 SCRN MAM PERF RSLTS DOC: HCPCS | Performed by: INTERNAL MEDICINE

## 2021-02-04 PROCEDURE — 80053 COMPREHEN METABOLIC PANEL: CPT | Performed by: INTERNAL MEDICINE

## 2021-02-04 PROCEDURE — 83036 HEMOGLOBIN GLYCOSYLATED A1C: CPT | Performed by: INTERNAL MEDICINE

## 2021-02-04 PROCEDURE — 99214 OFFICE O/P EST MOD 30 MIN: CPT | Performed by: INTERNAL MEDICINE

## 2021-02-04 PROCEDURE — G8754 DIAS BP LESS 90: HCPCS | Performed by: INTERNAL MEDICINE

## 2021-02-04 PROCEDURE — G8419 CALC BMI OUT NRM PARAM NOF/U: HCPCS | Performed by: INTERNAL MEDICINE

## 2021-02-04 NOTE — PROGRESS NOTES
Chief Complaint   Patient presents with    Hypertension     3 month follow up    Diabetes    Cholesterol Problem     Visit Vitals  BP (!) 150/82 (BP 1 Location: Left upper arm, BP Patient Position: Sitting, BP Cuff Size: Adult)   Pulse 94   Temp 98 °F (36.7 °C) (Temporal)   Resp 17   Ht 5' 7\" (1.702 m)   Wt 185 lb 12.8 oz (84.3 kg)   SpO2 99%   BMI 29.10 kg/m²     1. Have you been to the ER, urgent care clinic since your last visit? Hospitalized since your last visit? No    2. Have you seen or consulted any other health care providers outside of the [address] [place] [address] since your last visit? Include any pap smears or colon screening.  No

## 2021-02-04 NOTE — PATIENT INSTRUCTIONS

## 2021-02-05 NOTE — PROGRESS NOTES
Results are okay although the triglycerides and total cholesterol are a little bit up in the diet would help that but I would not change any medicines as the HDL is good.

## 2021-02-08 NOTE — PROGRESS NOTES
Results are okay although the triglycerides and total cholesterol are a little bit up in the diet would help that but I would not change any medicines as the HDL is good. Letter generated to patient regarding.

## 2021-03-18 DIAGNOSIS — E03.9 ACQUIRED HYPOTHYROIDISM: ICD-10-CM

## 2021-03-18 RX ORDER — LEVOTHYROXINE SODIUM 50 UG/1
TABLET ORAL
Qty: 30 TAB | Refills: 11 | Status: SHIPPED | OUTPATIENT
Start: 2021-03-18 | End: 2022-05-10

## 2021-03-18 NOTE — TELEPHONE ENCOUNTER
PCP: Janis Butler MD    Last appt: 5/4/2020  Future Appointments   Date Time Provider Zeny Oneil   5/6/2021  9:30 AM Janis Butler MD [facility]       Last refilled:2/24/20    Requested Prescriptions     Pending Prescriptions Disp Refills    levothyroxine (SYNTHROID) 50 mcg tablet [Pharmacy Med Name: LEVOTHYROXINE 50MCG] 30 Tab 11     Sig: TAKE 1 TABLET EVERY DAY

## 2021-05-05 NOTE — PROGRESS NOTES
Chief Complaint   Patient presents with    Hypertension     3 month follow up    Diabetes    Hypothyroidism       SUBJECTIVE:    Marcell Christopher is a 77 y.o. female returns in follow-up for medical problems include hypertension, diabetes, hyperlipidemia, paroxysmal atrial fibrillation, Crohn's disease currently in remission, history of small bowel obstruction, CKD stage II, DJD and other medical problems. She is taking her medications and trying to follow her diet and try and remain physically active. She currently denies any chest pain, shortness of breath, palpitations, PND, orthopnea or other cardiac or respiratory complaints. She notes no GI or  complaints. She notes no headaches, dizziness or neurologic complaints. No current active arthritic complaints and no other complaints on complete review of systems. Current Outpatient Medications   Medication Sig Dispense Refill    levothyroxine (SYNTHROID) 50 mcg tablet TAKE 1 TABLET EVERY DAY 30 Tab 11    metFORMIN ER (GLUCOPHAGE XR) 500 mg tablet TAKE 1 TABLET BY MOUTH TWICE A DAY WITH MEALS 180 Tab 3    hydroCHLOROthiazide (HYDRODIURIL) 25 mg tablet TAKE 1 TABLET BY MOUTH EVERY DAY 30 Tab 11    amLODIPine (NORVASC) 2.5 mg tablet TAKE 1 TABLET EVERY DAY 30 Tab 11    nateglinide (STARLIX) 120 mg tablet TAKE 1 TABLET THREE TIMES DAILY BEFORE MEALS 90 Tab 11    diclofenac EC (VOLTAREN) 75 mg EC tablet TAKE 1 TABLET TWICE A DAY WITH FOOD FOR PAIN AND INFLAMMATION 60 Tab 10    GUAIFENESIN/PSEUDOEPHEDRNE HCL (MUCINEX D PO) Take  by mouth as needed.  MULTIVIT,TH IRON,OTHER MIN (COMPLETE MULTIVITAMIN PO) Take 1 Tab by mouth daily. Indications: Complete multivitamin Women's 50+      calcium-cholecalciferol, D3, (CALTRATE 600+D) tablet Take 1 Tab by mouth daily.        Past Medical History:   Diagnosis Date    Acquired hypothyroidism 7/26/2017    CKD (chronic kidney disease) 7/26/2017    [facility]     Diabetes ([facility]     Hypertension     On statin therapy 2017    Pleurisy 2019    Post-menopausal 2017     Past Surgical History:   Procedure Laterality Date    COLONOSCOPY N/A 2018    COL[facility]COPY    COLONOSCOPY,DIAGNOSTIC  2018         HX BREAST BIOPSY Right     over 30 years  negative    HX GI      tumor from colon removed    HX ORTHOPAEDIC      right wrist    HX OTHER SURGICAL      Bowel Obstruction    MS BREAST SURGERY PROCEDURE UNLISTED      cyst removed right    MS COLONOSCOPY W/BIOPSY SINGLE/MULTIPLE  2014          Allergies   Allergen Reactions    Lisinopril Cough       REVIEW OF SYSTEMS:  General: negative for - chills or fever, or weight loss or gain  ENT: negative for - headaches, nasal congestion or tinnitus  Eyes: no blurred or visual changes  Neck: No stiffness or swollen nodes  Respiratory: negative for - cough, hemoptysis, shortness of breath or wheezing  Cardiovascular : negative for - chest pain, edema, palpitations or shortness of breath  Gastrointestinal: negative for - abdominal pain, blood in stools, heartburn or nausea/vomiting  Genito-Urinary: no dysuria, trouble voiding, or hematuria  Musculoskeletal: negative for - gait disturbance, joint pain, joint stiffness or joint swelling  Neurological: no TIA or stroke symptoms  Hematologic: no bruises, no bleeding  Lymphatic: no swollen glands  Integument: no lumps, mole changes, nail changes or rash  Endocrine:no malaise/lethargy poly uria or polydipsia or unexpected weight changes        Social History     Socioeconomic History    Marital status:      Spouse name: Not on file    Number of children: Not on file    Years of education: Not on file    Highest education level: Not on file   Tobacco Use    Smoking status: Former Smoker     Packs/day: 1.00     Years: 25.00     Pack years: 25.00     Quit date:      Years since quittin.3    Smokeless tobacco: Never Used   Substance and Sexual Activity    Alcohol use: Yes     Comment: 2 drinks per month     Drug use: No    Sexual activity: Never     Family History   Problem Relation Age of Onset    Diabetes Mother     Heart Failure Mother     Cancer Father         stomach       OBJECTIVE:     Visit Vitals  /82   Pulse 84   Temp 97.5 °F (36.4 °C) (Temporal)   Resp 17   Ht 5' 7\" (1.702 m)   Wt 186 lb 4.8 oz (84.5 kg)   SpO2 96%   BMI 29.18 kg/m²     CONSTITUTIONAL:   well nourished, appears age appropriate  EYES: sclera anicteric, PERRL, EOMI  ENMT:nares clear, moist mucous membranes, pharynx clear  NECK: supple. Thyroid normal, No JVD or bruits  RESPIRATORY: Chest: clear to ascultation and percussion, normal inspiratory effort  CARDIOVASCULAR: Heart: regular rate and rhythm no murmurs, rubs or gallops, PMI not displaced, No thrills, no peripheral edema  GASTROINTESTINAL: Abdomen: non distended, soft, non tender, bowel sounds normal  HEMATOLOGIC: no purpura, petechiae or bruising  LYMPHATIC: No lymph node enlargemant  MUSCULOSKELETAL: Extremities: no active synovitis, pulse 1+   INTEGUMENT: No unusual rashes or suspicious skin lesions noted. Nails appear normal.  PERIPHERAL VASCULAR: normal pulses femoral, PT and DP  NEUROLOGIC: non-focal exam, A & O X 3  PSYCHIATRIC:, appropriate affect     ASSESSMENT:   1. Essential hypertension    2. Controlled type 2 diabetes mellitus with stage 2 chronic kidney disease, without long-term current use of insulin ([address]    3. Mixed hyperlipidemia    4. PAF (paroxysmal atrial fibrillation) ([address]    5. Primary osteoarthritis involving multiple joints    6. Stage 2 chronic kidney disease      Impression  1. Hypertension that is controlled so continue current therapy reviewed with her. 2.  Diabetes repeat status pending a prior lab reviewed now make adjustments if necessary. 3.  Hyperlipidemia prior lab reviewed repeat status pending I will adjust if needed.   4. Paroxysmal atrial fibrillation no recent symptoms  5. DJD that is stable  6. CKD stage II repeat status pending  I will call with lab and make further recommendations or adjustments if necessary. Follow-up in 3 months or sooner if there is a problem. PLAN:  .  Orders Placed This Encounter    METABOLIC PANEL, COMPREHENSIVE    LIPID PANEL    CK    AMB POC HEMOGLOBIN A1C         ATTENTION:   This medical record was transcribed using an electronic medical records system. Although proofread, it may and can contain electronic and spelling errors. Other human spelling and other errors may be present. Corrections may be executed at a later time. Please feel free to contact us for any clarifications as needed. Follow-up and Dispositions    · Return in about 3 months (around 8/6/2021). No results found for any visits on 05/06/21. Freddie Mcneil MD    The patient verbalized understanding of the problems and plans as explained.

## 2021-05-06 ENCOUNTER — OFFICE VISIT (OUTPATIENT)
Dept: INTERNAL MEDICINE CLINIC | Age: 76
End: 2021-05-06
Payer: MEDICARE

## 2021-05-06 VITALS
BODY MASS INDEX: 29.24 KG/M2 | TEMPERATURE: 97.5 F | HEIGHT: 67 IN | RESPIRATION RATE: 17 BRPM | OXYGEN SATURATION: 96 % | HEART RATE: 84 BPM | WEIGHT: 186.3 LBS | SYSTOLIC BLOOD PRESSURE: 136 MMHG | DIASTOLIC BLOOD PRESSURE: 82 MMHG

## 2021-05-06 DIAGNOSIS — I48.0 PAF (PAROXYSMAL ATRIAL FIBRILLATION) (HCC): ICD-10-CM

## 2021-05-06 DIAGNOSIS — E78.2 MIXED HYPERLIPIDEMIA: ICD-10-CM

## 2021-05-06 DIAGNOSIS — E11.22 CONTROLLED TYPE 2 DIABETES MELLITUS WITH STAGE 2 CHRONIC KIDNEY DISEASE, WITHOUT LONG-TERM CURRENT USE OF INSULIN (HCC): ICD-10-CM

## 2021-05-06 DIAGNOSIS — I10 ESSENTIAL HYPERTENSION: Primary | ICD-10-CM

## 2021-05-06 DIAGNOSIS — M15.9 PRIMARY OSTEOARTHRITIS INVOLVING MULTIPLE JOINTS: ICD-10-CM

## 2021-05-06 DIAGNOSIS — N18.2 STAGE 2 CHRONIC KIDNEY DISEASE: ICD-10-CM

## 2021-05-06 DIAGNOSIS — N18.2 CONTROLLED TYPE 2 DIABETES MELLITUS WITH STAGE 2 CHRONIC KIDNEY DISEASE, WITHOUT LONG-TERM CURRENT USE OF INSULIN (HCC): ICD-10-CM

## 2021-05-06 LAB
ALBUMIN SERPL-MCNC: 4.2 G/DL (ref 3.5–5)
ALBUMIN/GLOB SERPL: 1.4 {RATIO} (ref 1.1–2.2)
ALP SERPL-CCNC: 81 U/L (ref 45–117)
ALT SERPL-CCNC: 31 U/L (ref 12–78)
ANION GAP SERPL CALC-SCNC: 6 MMOL/L (ref 5–15)
AST SERPL-CCNC: 26 U/L (ref 15–37)
BILIRUB SERPL-MCNC: 0.7 MG/DL (ref 0.2–1)
BUN SERPL-MCNC: 24 MG/DL (ref 6–20)
BUN/CREAT SERPL: 31 (ref 12–20)
CALCIUM SERPL-MCNC: 9.2 MG/DL (ref 8.5–10.1)
CHLORIDE SERPL-SCNC: 103 MMOL/L (ref 97–108)
CHOLEST SERPL-MCNC: 223 MG/DL
CK SERPL-CCNC: 372 U/L (ref 26–192)
CO2 SERPL-SCNC: 31 MMOL/L (ref 21–32)
CREAT SERPL-MCNC: 0.78 MG/DL (ref 0.55–1.02)
GLOBULIN SER CALC-MCNC: 3.1 G/DL (ref 2–4)
GLUCOSE SERPL-MCNC: 133 MG/DL (ref 65–100)
HBA1C MFR BLD HPLC: 5.9 % (ref 4.5–5.7)
HDLC SERPL-MCNC: 69 MG/DL
HDLC SERPL: 3.2 {RATIO} (ref 0–5)
LDLC SERPL CALC-MCNC: 103 MG/DL (ref 0–100)
LIPID PROFILE,FLP: ABNORMAL
POTASSIUM SERPL-SCNC: 4 MMOL/L (ref 3.5–5.1)
PROT SERPL-MCNC: 7.3 G/DL (ref 6.4–8.2)
SODIUM SERPL-SCNC: 140 MMOL/L (ref 136–145)
TRIGL SERPL-MCNC: 255 MG/DL (ref ?–150)
VLDLC SERPL CALC-MCNC: 51 MG/DL

## 2021-05-06 PROCEDURE — 3017F COLORECTAL CA SCREEN DOC REV: CPT | Performed by: INTERNAL MEDICINE

## 2021-05-06 PROCEDURE — G8510 SCR DEP NEG, NO PLAN REQD: HCPCS | Performed by: INTERNAL MEDICINE

## 2021-05-06 PROCEDURE — 1090F PRES/ABSN URINE INCON ASSESS: CPT | Performed by: INTERNAL MEDICINE

## 2021-05-06 PROCEDURE — 1101F PT FALLS ASSESS-DOCD LE1/YR: CPT | Performed by: INTERNAL MEDICINE

## 2021-05-06 PROCEDURE — G8536 NO DOC ELDER MAL SCRN: HCPCS | Performed by: INTERNAL MEDICINE

## 2021-05-06 PROCEDURE — 3044F HG A1C LEVEL LT 7.0%: CPT | Performed by: INTERNAL MEDICINE

## 2021-05-06 PROCEDURE — 83036 HEMOGLOBIN GLYCOSYLATED A1C: CPT | Performed by: INTERNAL MEDICINE

## 2021-05-06 PROCEDURE — 99214 OFFICE O/P EST MOD 30 MIN: CPT | Performed by: INTERNAL MEDICINE

## 2021-05-06 PROCEDURE — G8399 PT W/DXA RESULTS DOCUMENT: HCPCS | Performed by: INTERNAL MEDICINE

## 2021-05-06 PROCEDURE — G8754 DIAS BP LESS 90: HCPCS | Performed by: INTERNAL MEDICINE

## 2021-05-06 PROCEDURE — G8427 DOCREV CUR MEDS BY ELIG CLIN: HCPCS | Performed by: INTERNAL MEDICINE

## 2021-05-06 PROCEDURE — 2022F DILAT RTA XM EVC RTNOPTHY: CPT | Performed by: INTERNAL MEDICINE

## 2021-05-06 PROCEDURE — G8419 CALC BMI OUT NRM PARAM NOF/U: HCPCS | Performed by: INTERNAL MEDICINE

## 2021-05-06 PROCEDURE — G8752 SYS BP LESS 140: HCPCS | Performed by: INTERNAL MEDICINE

## 2021-05-06 NOTE — PROGRESS NOTES
Chief Complaint   Patient presents with    Hypertension     3 month follow up    Diabetes    Hypothyroidism     Visit Vitals  BP (!) 152/94 (BP 1 Location: Left upper arm, BP Patient Position: Sitting, BP Cuff Size: Adult)   Pulse 84   Temp 97.5 °F (36.4 °C) (Temporal)   Resp 17   Ht 5' 7\" (1.702 m)   Wt 186 lb 4.8 oz (84.5 kg)   SpO2 96%   BMI 29.18 kg/m²     1. Have you been to the ER, urgent care clinic since your last visit? Hospitalized since your last visit? No    2. Have you seen or consulted any other health care providers outside of the [address] [place] [address] since your last visit? Include any pap smears or colon screening.  No

## 2021-05-06 NOTE — PATIENT INSTRUCTIONS

## 2021-06-14 DIAGNOSIS — M13.0 ARTHRITIS OF MULTIPLE SITES: ICD-10-CM

## 2021-06-14 RX ORDER — DICLOFENAC SODIUM 75 MG/1
TABLET, DELAYED RELEASE ORAL
Qty: 60 TABLET | Refills: 10 | Status: SHIPPED | OUTPATIENT
Start: 2021-06-14 | End: 2022-07-05

## 2021-06-14 NOTE — TELEPHONE ENCOUNTER
PCP: Kylie Andino MD    Last appt: 5/4/2020  Future Appointments   Date Time Provider Zeny Oneil   8/10/2021 10:00 AM Kylie Andino MD [facility]       Last refilled:4/22/20    Requested Prescriptions     Pending Prescriptions Disp Refills    diclofenac EC (VOLTAREN) 75 mg EC tablet [Pharmacy Med Name: DICLOFENAC  75MG DR] 60 Tablet 10     Sig: TAKE 1 TABLET TWICE A DAY WITH FOOD FOR PAIN AND INFLAMMATION

## 2021-08-10 ENCOUNTER — OFFICE VISIT (OUTPATIENT)
Dept: INTERNAL MEDICINE CLINIC | Age: 76
End: 2021-08-10
Payer: MEDICARE

## 2021-08-10 VITALS
SYSTOLIC BLOOD PRESSURE: 132 MMHG | DIASTOLIC BLOOD PRESSURE: 80 MMHG | RESPIRATION RATE: 16 BRPM | HEIGHT: 67 IN | TEMPERATURE: 98.5 F | WEIGHT: 183.6 LBS | HEART RATE: 102 BPM | OXYGEN SATURATION: 98 % | BODY MASS INDEX: 28.82 KG/M2

## 2021-08-10 DIAGNOSIS — E78.2 MIXED HYPERLIPIDEMIA: ICD-10-CM

## 2021-08-10 DIAGNOSIS — N18.2 STAGE 2 CHRONIC KIDNEY DISEASE: ICD-10-CM

## 2021-08-10 DIAGNOSIS — I48.0 PAF (PAROXYSMAL ATRIAL FIBRILLATION) (HCC): ICD-10-CM

## 2021-08-10 DIAGNOSIS — N18.2 CONTROLLED TYPE 2 DIABETES MELLITUS WITH STAGE 2 CHRONIC KIDNEY DISEASE, WITHOUT LONG-TERM CURRENT USE OF INSULIN (HCC): ICD-10-CM

## 2021-08-10 DIAGNOSIS — I10 ESSENTIAL HYPERTENSION: Primary | ICD-10-CM

## 2021-08-10 DIAGNOSIS — E11.22 CONTROLLED TYPE 2 DIABETES MELLITUS WITH STAGE 2 CHRONIC KIDNEY DISEASE, WITHOUT LONG-TERM CURRENT USE OF INSULIN (HCC): ICD-10-CM

## 2021-08-10 DIAGNOSIS — M54.2 NECK PAIN: ICD-10-CM

## 2021-08-10 DIAGNOSIS — M15.9 PRIMARY OSTEOARTHRITIS INVOLVING MULTIPLE JOINTS: ICD-10-CM

## 2021-08-10 PROCEDURE — 2022F DILAT RTA XM EVC RTNOPTHY: CPT | Performed by: INTERNAL MEDICINE

## 2021-08-10 PROCEDURE — 1101F PT FALLS ASSESS-DOCD LE1/YR: CPT | Performed by: INTERNAL MEDICINE

## 2021-08-10 PROCEDURE — 1090F PRES/ABSN URINE INCON ASSESS: CPT | Performed by: INTERNAL MEDICINE

## 2021-08-10 PROCEDURE — 99214 OFFICE O/P EST MOD 30 MIN: CPT | Performed by: INTERNAL MEDICINE

## 2021-08-10 PROCEDURE — G8427 DOCREV CUR MEDS BY ELIG CLIN: HCPCS | Performed by: INTERNAL MEDICINE

## 2021-08-10 PROCEDURE — G8510 SCR DEP NEG, NO PLAN REQD: HCPCS | Performed by: INTERNAL MEDICINE

## 2021-08-10 PROCEDURE — 3017F COLORECTAL CA SCREEN DOC REV: CPT | Performed by: INTERNAL MEDICINE

## 2021-08-10 PROCEDURE — G8419 CALC BMI OUT NRM PARAM NOF/U: HCPCS | Performed by: INTERNAL MEDICINE

## 2021-08-10 PROCEDURE — G8399 PT W/DXA RESULTS DOCUMENT: HCPCS | Performed by: INTERNAL MEDICINE

## 2021-08-10 PROCEDURE — G8536 NO DOC ELDER MAL SCRN: HCPCS | Performed by: INTERNAL MEDICINE

## 2021-08-10 PROCEDURE — G8752 SYS BP LESS 140: HCPCS | Performed by: INTERNAL MEDICINE

## 2021-08-10 PROCEDURE — 3044F HG A1C LEVEL LT 7.0%: CPT | Performed by: INTERNAL MEDICINE

## 2021-08-10 PROCEDURE — G8754 DIAS BP LESS 90: HCPCS | Performed by: INTERNAL MEDICINE

## 2021-08-10 RX ORDER — METFORMIN HYDROCHLORIDE 500 MG/1
TABLET, EXTENDED RELEASE ORAL
Qty: 180 TABLET | Refills: 3 | Status: SHIPPED | OUTPATIENT
Start: 2021-08-10 | End: 2022-08-08

## 2021-08-10 NOTE — PROGRESS NOTES
Chief Complaint   Patient presents with    Hypertension     3 month    Diabetes    Cholesterol Problem    Chronic Kidney Disease    Irregular Heart Beat     paroxysmal atrial fibrillation       SUBJECTIVE:    Wilfred Erickson is a 77 y.o. female who returns in follow-up for medical problems include hypertension, diabetes, hyperlipidemia, paroxysmal atrial fibrillation, DJD and other medical problems. She is noting some pain in her neck on the right side that hurts certain when she turns her neck. She does note sometimes bothers her when she tries to sleep. She has had no falls or trauma. She denies any chest pain, shortness of breath, palpitations, PND, orthopnea or other cardiac or respiratory complaints. She notes no current GI or  complaints. She has no headaches, dizziness or neurologic complaints. Other than the neck no arthritic complaints. She has no other complaints on complete review systems. Current Outpatient Medications   Medication Sig Dispense Refill    metFORMIN ER (GLUCOPHAGE XR) 500 mg tablet TAKE 1 TABLET BY MOUTH TWICE A DAY WITH MEALS 180 Tablet 3    diclofenac EC (VOLTAREN) 75 mg EC tablet TAKE 1 TABLET TWICE A DAY WITH FOOD FOR PAIN AND INFLAMMATION 60 Tablet 10    levothyroxine (SYNTHROID) 50 mcg tablet TAKE 1 TABLET EVERY DAY 30 Tab 11    hydroCHLOROthiazide (HYDRODIURIL) 25 mg tablet TAKE 1 TABLET BY MOUTH EVERY DAY 30 Tab 11    amLODIPine (NORVASC) 2.5 mg tablet TAKE 1 TABLET EVERY DAY 30 Tab 11    nateglinide (STARLIX) 120 mg tablet TAKE 1 TABLET THREE TIMES DAILY BEFORE MEALS 90 Tab 11    GUAIFENESIN/PSEUDOEPHEDRNE HCL (MUCINEX D PO) Take  by mouth as needed.  MULTIVIT,TH IRON,OTHER MIN (COMPLETE MULTIVITAMIN PO) Take 1 Tab by mouth daily. Indications: Complete multivitamin Women's 50+      calcium-cholecalciferol, D3, (CALTRATE 600+D) tablet Take 1 Tab by mouth daily.        Past Medical History:   Diagnosis Date    Acquired hypothyroidism 7/26/2017  CKD (chronic kidney disease) 7/26/2017    [facility]     Diabetes [facility]     Hypertension     On statin therapy 7/26/2017    Pleurisy 07/17/2019    Post-menopausal 7/26/2017     Past Surgical History:   Procedure Laterality Date    COLONOSCOPY N/A 5/1/2018    COLONOSCOPY performed by [facility]    COLONOSCOPY,DIAGNOSTIC  5/1/2018         HX BREAST BIOPSY Right     over 30 years  negative    HX GI      tumor from colon removed    HX ORTHOPAEDIC      right wrist    HX OTHER SURGICAL      Bowel Obstruction    VA BREAST SURGERY PROCEDURE UNLISTED      cyst removed right    VA COLONOSCOPY W/BIOPSY SINGLE/MULTIPLE  4/9/2014          Allergies   Allergen Reactions    Lisinopril Cough       REVIEW OF SYSTEMS:  General: negative for - chills or fever, or weight loss or gain  ENT: negative for - headaches, nasal congestion or tinnitus  Eyes: no blurred or visual changes  Neck: No stiffness or swollen nodes but positive for some pain on the right side that hurts certainly she moves it and seems to go up the back of her head  Respiratory: negative for - cough, hemoptysis, shortness of breath or wheezing  Cardiovascular : negative for - chest pain, edema, palpitations or shortness of breath  Gastrointestinal: negative for - abdominal pain, blood in stools, heartburn or nausea/vomiting  Genito-Urinary: no dysuria, trouble voiding, or hematuria  Musculoskeletal: negative for - gait disturbance, joint pain, joint stiffness or joint swelling  Neurological: no TIA or stroke symptoms  Hematologic: no bruises, no bleeding  Lymphatic: no swollen glands  Integument: no lumps, mole changes, nail changes or rash  Endocrine:no malaise/lethargy poly uria or polydipsia or unexpected weight changes        Social History     Socioeconomic History    Marital status:      Spouse name: Not on file    Number of children: Not on file    Years of education: Not on file    Highest education level: Not on file   Tobacco Use    Smoking status: Former Smoker     Packs/day: 1.00     Years: 25.00     Pack years: 25.00     Quit date:      Years since quittin.6    Smokeless tobacco: Never Used   Vaping Use    Vaping Use: Never used   Substance and Sexual Activity    Alcohol use: Yes     Comment: 2 drinks per month     Drug use: No    Sexual activity: Never     Social Determinants of Health     Financial Resource Strain:     Difficulty of Paying Living Expenses:    Food Insecurity:     Worried About Running Out of Food in the Last Year:     Ran Out of Food in the Last Year:    Transportation Needs:     Lack of Transportation (Medical):  Lack of Transportation (Non-Medical):    Physical Activity:     Days of Exercise per Week:     Minutes of Exercise per Session:    Stress:     Feeling of Stress :    Social Connections:     Frequency of Communication with Friends and Family:     Frequency of Social Gatherings with Friends and Family:     Attends Faith Services:     Active Member of Clubs or Organizations:     Attends Club or Organization Meetings:     Marital Status:      Family History   Problem Relation Age of Onset    Diabetes Mother     Heart Failure Mother     Cancer Father         stomach       OBJECTIVE:     Visit Vitals  /80 (BP 1 Location: Left upper arm, BP Patient Position: Sitting, BP Cuff Size: Adult)   Pulse (!) 102   Temp 98.5 °F (36.9 °C) (Oral)   Resp 16   Ht 5' 7\" (1.702 m)   Wt 183 lb 9.6 oz (83.3 kg)   SpO2 98%   BMI 28.76 kg/m²     CONSTITUTIONAL:   well nourished, appears age appropriate  EYES: sclera anicteric, PERRL, EOMI  ENMT:nares clear, moist mucous membranes, pharynx clear  NECK: supple.  Thyroid normal, No JVD or bruits  RESPIRATORY: Chest: clear to ascultation and percussion, normal inspiratory effort  CARDIOVASCULAR: Heart: regular rate and rhythm no murmurs, rubs or gallops, PMI not displaced, No thrills, no peripheral edema  GASTROINTESTINAL: Abdomen: non distended, soft, non tender, bowel sounds normal  HEMATOLOGIC: no purpura, petechiae or bruising  LYMPHATIC: No lymph node enlargemant  MUSCULOSKELETAL: Extremities: no active synovitis, pulse 1+   INTEGUMENT: No unusual rashes or suspicious skin lesions noted. Nails appear normal.  PERIPHERAL VASCULAR: normal pulses femoral, PT and DP  NEUROLOGIC: non-focal exam, A & O X 3  PSYCHIATRIC:, appropriate affect     ASSESSMENT:   1. Essential hypertension    2. Controlled type 2 diabetes mellitus with stage 2 chronic kidney disease, without long-term current use of insulin ([address]    3. Mixed hyperlipidemia    4. PAF (paroxysmal atrial fibrillation) ([address]    5. Primary osteoarthritis involving multiple joints    6. Stage 2 chronic kidney disease    7. Neck pain      Impression  1. Hypertension that is controlled so continue current therapy reviewed with her. 2.  Diabetes repeat status pending a prior lab review not make adjustments if necessary. 3.  Hyperlipidemia prior lab reviewed and repeat status pending I will adjust if needed. 4.  Atrial fibrillation no evidence of recent recurrence  5. DJD stable except for the neck which I recommend some Tylenol arthritis 4.  6.  CKD stage II repeat status pending  7. Neck pain as noted recommended Tylenol arthritis if it continues to be a problem then x-rays would be in order  I will recheck her in 3 months or sooner should the be a problem. I will call her lab results in interim. PLAN:  .  Orders Placed This Encounter    METABOLIC PANEL, COMPREHENSIVE    LIPID PANEL    CK    HEMOGLOBIN A1C WITH EAG    metFORMIN ER (GLUCOPHAGE XR) 500 mg tablet         ATTENTION:   This medical record was transcribed using an electronic medical records system. Although proofread, it may and can contain electronic and spelling errors. Other human spelling and other errors may be present. Corrections may be executed at a later time. Please feel free to contact us for any clarifications as needed. Follow-up and Dispositions    · Return in about 3 months (around 11/10/2021). No results found for any visits on 08/10/21. Anisa Huang MD    The patient verbalized understanding of the problems and plans as explained.

## 2021-08-10 NOTE — PROGRESS NOTES
HIPAA verified by two patient identifiers. Taylor Jones is a 77 y.o. female    Chief Complaint   Patient presents with    Hypertension     3 month    Diabetes    Cholesterol Problem    Chronic Kidney Disease    Irregular Heart Beat     paroxysmal atrial fibrillation       Visit Vitals  /80 (BP 1 Location: Left upper arm, BP Patient Position: Sitting, BP Cuff Size: Adult)   Pulse (!) 102   Temp 98.5 °F (36.9 °C) (Oral)   Resp 16   Ht 5' 7\" (1.702 m)   Wt 183 lb 9.6 oz (83.3 kg)   SpO2 98%   BMI 28.76 kg/m²       Pain Scale: 8/10  Pain Location: Neck      Health Maintenance Due   Topic Date Due    DTaP/Tdap/Td series (1 - Tdap) Never done    Colorectal Cancer Screening Combo  05/01/2021         Coordination of Care Questionnaire:  :   1) Have you been to an emergency room, urgent care, or hospitalized since your last visit? If yes, where when, and reason for visit? no       2. Have seen or consulted any other health care provider since your last visit? If yes, where when, and reason for visit? NO      Patient is accompanied by self I have received verbal consent from Taylor Jones to discuss any/all medical information while they are present in the room. PCP: Merlyn Fuller MD    Last appt: 5/6/2021  No future appointments.     Requested Prescriptions     Pending Prescriptions Disp Refills    metFORMIN ER (GLUCOPHAGE XR) 500 mg tablet 180 Tablet 3         Other Comments:

## 2021-08-11 LAB
ALBUMIN SERPL-MCNC: 4.3 G/DL (ref 3.5–5)
ALBUMIN/GLOB SERPL: 1.4 {RATIO} (ref 1.1–2.2)
ALP SERPL-CCNC: 84 U/L (ref 45–117)
ALT SERPL-CCNC: 33 U/L (ref 12–78)
ANION GAP SERPL CALC-SCNC: 7 MMOL/L (ref 5–15)
AST SERPL-CCNC: 24 U/L (ref 15–37)
BILIRUB SERPL-MCNC: 0.7 MG/DL (ref 0.2–1)
BUN SERPL-MCNC: 24 MG/DL (ref 6–20)
BUN/CREAT SERPL: 28 (ref 12–20)
CALCIUM SERPL-MCNC: 9.6 MG/DL (ref 8.5–10.1)
CHLORIDE SERPL-SCNC: 100 MMOL/L (ref 97–108)
CHOLEST SERPL-MCNC: 230 MG/DL
CK SERPL-CCNC: 210 U/L (ref 26–192)
CO2 SERPL-SCNC: 30 MMOL/L (ref 21–32)
CREAT SERPL-MCNC: 0.87 MG/DL (ref 0.55–1.02)
EST. AVERAGE GLUCOSE BLD GHB EST-MCNC: 123 MG/DL
GLOBULIN SER CALC-MCNC: 3.1 G/DL (ref 2–4)
GLUCOSE SERPL-MCNC: 128 MG/DL (ref 65–100)
HBA1C MFR BLD: 5.9 % (ref 4–5.6)
HDLC SERPL-MCNC: 65 MG/DL
HDLC SERPL: 3.5 {RATIO} (ref 0–5)
LDLC SERPL CALC-MCNC: 106 MG/DL (ref 0–100)
POTASSIUM SERPL-SCNC: 4.4 MMOL/L (ref 3.5–5.1)
PROT SERPL-MCNC: 7.4 G/DL (ref 6.4–8.2)
SODIUM SERPL-SCNC: 137 MMOL/L (ref 136–145)
TRIGL SERPL-MCNC: 295 MG/DL (ref ?–150)
VLDLC SERPL CALC-MCNC: 59 MG/DL

## 2021-08-11 NOTE — PROGRESS NOTES
Still has increased total cholesterol, increase triglycerides and increase LDL although that is only minimally elevated and HDL is still good so at this point continue to work on diet. Blood sugar and glycol still borderline so again diet.

## 2021-08-12 NOTE — PROGRESS NOTES
Still has increased total cholesterol, increase triglycerides and increase LDL although that is only minimally elevated and HDL is still good so at this point continue to work on diet.  Blood sugar and glycol still borderline so again diet. Letter generated to patient regarding.

## 2021-08-13 RX ORDER — NATEGLINIDE 120 MG/1
TABLET ORAL
Qty: 90 TABLET | Refills: 11 | Status: SHIPPED | OUTPATIENT
Start: 2021-08-13 | End: 2022-08-22

## 2021-08-13 NOTE — TELEPHONE ENCOUNTER
RX refill request from the patient/pharmacy. Patient last seen 08- with labs, and next appt. scheduled for 11-  Requested Prescriptions     Pending Prescriptions Disp Refills    nateglinide (STARLIX) 120 mg tablet [Pharmacy Med Name: NATEGLINIDE 120 MG] 90 Tablet 11     Sig: TAKE 1 TABLET THREE TIMES DAILY BEFORE MEALS   .

## 2021-08-23 ENCOUNTER — TRANSCRIBE ORDER (OUTPATIENT)
Dept: SCHEDULING | Age: 76
End: 2021-08-23

## 2021-08-23 DIAGNOSIS — Z12.31 SCREENING MAMMOGRAM FOR HIGH-RISK PATIENT: Primary | ICD-10-CM

## 2021-09-13 DIAGNOSIS — I10 ESSENTIAL HYPERTENSION: ICD-10-CM

## 2021-09-13 RX ORDER — AMLODIPINE BESYLATE 2.5 MG/1
TABLET ORAL
Qty: 30 TABLET | Refills: 11 | Status: SHIPPED | OUTPATIENT
Start: 2021-09-13

## 2021-09-14 ENCOUNTER — HOSPITAL ENCOUNTER (OUTPATIENT)
Dept: MAMMOGRAPHY | Age: 76
Discharge: HOME | End: 2021-09-14
Attending: INTERNAL MEDICINE
Payer: MEDICARE

## 2021-09-14 DIAGNOSIS — Z12.31 SCREENING MAMMOGRAM FOR HIGH-RISK PATIENT: ICD-10-CM

## 2021-09-14 PROCEDURE — 77063 BREAST TOMOSYNTHESIS BI: CPT

## 2021-10-01 ENCOUNTER — HOSPITAL ENCOUNTER (OUTPATIENT)
Dept: PREADMISSION TESTING | Age: 76
Discharge: HOME | End: 2021-10-01
Payer: MEDICARE

## 2021-10-01 PROCEDURE — U0005 INFEC AGEN DETEC AMPLI PROBE: HCPCS

## 2021-10-02 LAB
SARS-COV-2, XPLCVT: NOT DETECTED
SOURCE, COVRS: NORMAL

## 2021-10-05 ENCOUNTER — HOSPITAL ENCOUNTER (OUTPATIENT)
Age: 76
Setting detail: OUTPATIENT SURGERY
Discharge: HOME | End: 2021-10-05
Attending: INTERNAL MEDICINE | Admitting: INTERNAL MEDICINE
Payer: MEDICARE

## 2021-10-05 ENCOUNTER — ANESTHESIA EVENT (OUTPATIENT)
Dept: ENDOSCOPY | Age: 76
End: 2021-10-05
Payer: MEDICARE

## 2021-10-05 ENCOUNTER — ANESTHESIA (OUTPATIENT)
Dept: ENDOSCOPY | Age: 76
End: 2021-10-05
Payer: MEDICARE

## 2021-10-05 VITALS
BODY MASS INDEX: 29.28 KG/M2 | WEIGHT: 182.2 LBS | SYSTOLIC BLOOD PRESSURE: 130 MMHG | HEART RATE: 85 BPM | OXYGEN SATURATION: 97 % | DIASTOLIC BLOOD PRESSURE: 79 MMHG | HEIGHT: 66 IN | RESPIRATION RATE: 18 BRPM | TEMPERATURE: 98 F

## 2021-10-05 PROCEDURE — 77030021593 HC FCPS BIOP ENDOSC BSC -A: Performed by: INTERNAL MEDICINE

## 2021-10-05 PROCEDURE — 76060000031 HC ANESTHESIA FIRST 0.5 HR: Performed by: INTERNAL MEDICINE

## 2021-10-05 PROCEDURE — 74011250636 HC RX REV CODE- 250/636: Performed by: INTERNAL MEDICINE

## 2021-10-05 PROCEDURE — 88305 TISSUE EXAM BY PATHOLOGIST: CPT

## 2021-10-05 PROCEDURE — 74011250636 HC RX REV CODE- 250/636: Performed by: ANESTHESIOLOGY

## 2021-10-05 PROCEDURE — 74011250637 HC RX REV CODE- 250/637: Performed by: INTERNAL MEDICINE

## 2021-10-05 PROCEDURE — 76040000019: Performed by: INTERNAL MEDICINE

## 2021-10-05 PROCEDURE — 74011000250 HC RX REV CODE- 250: Performed by: ANESTHESIOLOGY

## 2021-10-05 PROCEDURE — 2709999900 HC NON-CHARGEABLE SUPPLY: Performed by: INTERNAL MEDICINE

## 2021-10-05 RX ORDER — ATROPINE SULFATE 0.1 MG/ML
0.5 INJECTION INTRAVENOUS
Status: DISCONTINUED | OUTPATIENT
Start: 2021-10-05 | End: 2021-10-06 | Stop reason: HOSPADM

## 2021-10-05 RX ORDER — PROPOFOL 10 MG/ML
INJECTION, EMULSION INTRAVENOUS AS NEEDED
Status: DISCONTINUED | OUTPATIENT
Start: 2021-10-05 | End: 2021-10-05 | Stop reason: HOSPADM

## 2021-10-05 RX ORDER — EPINEPHRINE 0.1 MG/ML
1 INJECTION INTRACARDIAC; INTRAVENOUS
Status: DISCONTINUED | OUTPATIENT
Start: 2021-10-05 | End: 2021-10-06 | Stop reason: HOSPADM

## 2021-10-05 RX ORDER — NALOXONE HYDROCHLORIDE 0.4 MG/ML
0.4 INJECTION, SOLUTION INTRAMUSCULAR; INTRAVENOUS; SUBCUTANEOUS
Status: DISCONTINUED | OUTPATIENT
Start: 2021-10-05 | End: 2021-10-05 | Stop reason: HOSPADM

## 2021-10-05 RX ORDER — FLUMAZENIL 0.1 MG/ML
0.2 INJECTION INTRAVENOUS
Status: DISCONTINUED | OUTPATIENT
Start: 2021-10-05 | End: 2021-10-05 | Stop reason: HOSPADM

## 2021-10-05 RX ORDER — SODIUM CHLORIDE 9 MG/ML
100 INJECTION, SOLUTION INTRAVENOUS CONTINUOUS
Status: DISCONTINUED | OUTPATIENT
Start: 2021-10-05 | End: 2021-10-05 | Stop reason: HOSPADM

## 2021-10-05 RX ORDER — SODIUM CHLORIDE 0.9 % (FLUSH) 0.9 %
5-40 SYRINGE (ML) INJECTION AS NEEDED
Status: DISCONTINUED | OUTPATIENT
Start: 2021-10-05 | End: 2021-10-06 | Stop reason: HOSPADM

## 2021-10-05 RX ORDER — SODIUM CHLORIDE 0.9 % (FLUSH) 0.9 %
5-40 SYRINGE (ML) INJECTION EVERY 8 HOURS
Status: DISCONTINUED | OUTPATIENT
Start: 2021-10-05 | End: 2021-10-06 | Stop reason: HOSPADM

## 2021-10-05 RX ORDER — DEXTROMETHORPHAN/PSEUDOEPHED 2.5-7.5/.8
1.2 DROPS ORAL
Status: DISCONTINUED | OUTPATIENT
Start: 2021-10-05 | End: 2021-10-06 | Stop reason: HOSPADM

## 2021-10-05 RX ORDER — LIDOCAINE HYDROCHLORIDE 20 MG/ML
INJECTION, SOLUTION EPIDURAL; INFILTRATION; INTRACAUDAL; PERINEURAL AS NEEDED
Status: DISCONTINUED | OUTPATIENT
Start: 2021-10-05 | End: 2021-10-05 | Stop reason: HOSPADM

## 2021-10-05 RX ADMIN — PROPOFOL 160 MG: 10 INJECTION, EMULSION INTRAVENOUS at 14:53

## 2021-10-05 RX ADMIN — SODIUM CHLORIDE 100 ML/HR: 900 INJECTION, SOLUTION INTRAVENOUS at 14:28

## 2021-10-05 RX ADMIN — SIMETHICONE 80 MG: 20 SUSPENSION/ DROPS ORAL at 14:43

## 2021-10-05 RX ADMIN — LIDOCAINE HYDROCHLORIDE 40 MG: 20 INJECTION, SOLUTION EPIDURAL; INFILTRATION; INTRACAUDAL; PERINEURAL at 14:39

## 2021-10-05 NOTE — DISCHARGE INSTRUCTIONS
Jeanine Connelly MD  [facility], [address]  [address], [address] [facility]  [phone]  [email or web address]    Janice La  [id number]  1945    COLON DISCHARGE INSTRUCTIONS  Discomfort:  Redness at IV site- apply warm compress to area; if redness or soreness persist- contact your physician  There may be a slight amount of blood passed from the rectum  Gaseous discomfort- walking, belching will help relieve any discomfort  You may not operate a vehicle for 12 hours  You may not engage in an occupation involving machinery or appliances for rest of today  You may not drink alcoholic beverages for at least 12 hours  Avoid making any critical decisions for at least 24 hour  DIET:   Regular diet. - however -  remember your colon is empty and a heavy meal will produce gas. Avoid these foods:  vegetables, fried / greasy foods, carbonated drinks for today      ACTIVITY:  You may resume your normal daily activities it is recommended that you spend the remainder of the day resting -  avoid any strenuous activity. CALL M.D. ANY SIGN OF:   Increasing pain, nausea, vomiting  Abdominal distension (swelling)  New increased bleeding (oral or rectal)  Fever (chills)  Pain in chest area  Bloody discharge from nose or mouth  Shortness of breath     COLONOSCOPY FINDINGS:  Your colonoscopy showed: inactive Crohns which was biopsied. Follow-up Instructions:   Call Dr. Jeanine Connelly if any questions or problems. Telephone # [phone]  Dr. Chelita Pierec office will notify you of the biopsy results within 7 to 10 days. Should have a repeat colonoscopy in 3 years.

## 2021-10-05 NOTE — PROGRESS NOTES
Endoscope was pre-cleaned at bedside immediately following procedure by Jennifer Ko returned to patient immediately post-procedure.

## 2021-10-05 NOTE — PROCEDURES
Rosamaria Burt MD  [facility], [address]  [place], [address] [facility]  [phone]  [email or web address]    Higinio Granda  [id number]  1945    COLON DISCHARGE INSTRUCTIONS  Discomfort:  Redness at IV site- apply warm compress to area; if redness or soreness persist- contact your physician  There may be a slight amount of blood passed from the rectum  Gaseous discomfort- walking, belching will help relieve any discomfort  You may not operate a vehicle for 12 hours  You may not engage in an occupation involving machinery or appliances for rest of today  You may not drink alcoholic beverages for at least 12 hours  Avoid making any critical decisions for at least 24 hour  DIET:   Regular diet. - however -  remember your colon is empty and a heavy meal will produce gas. Avoid these foods:  vegetables, fried / greasy foods, carbonated drinks for today      ACTIVITY:  You may resume your normal daily activities it is recommended that you spend the remainder of the day resting -  avoid any strenuous activity. CALL M.D. ANY SIGN OF:   Increasing pain, nausea, vomiting  Abdominal distension (swelling)  New increased bleeding (oral or rectal)  Fever (chills)  Pain in chest area  Bloody discharge from nose or mouth  Shortness of breath     COLONOSCOPY FINDINGS:  Your colonoscopy showed: inactive Crohn's disease. Follow-up Instructions:   Call Dr. Rosamaria Burt if any questions or problems. Telephone # [phone]  Dr. Anny Camacho office will notify you of the biopsy results within 7 to 10 days. Should have a repeat colonoscopy in 3 years.

## 2021-10-05 NOTE — ROUTINE PROCESS
Ezra Corona  1945  [id number]    Situation:  Verbal report received from: Dora Peralta  Procedure: Procedure(s):  COLONOSCOPY  COLON BIOPSY    Background:    Preoperative diagnosis: SCREENING  Postoperative diagnosis: History of Crohn's disease- inactive at this time. :  Dr. Cherylyn Leventhal  Assistant(s): Endoscopy Technician-1: Devorah Mishra  Endoscopy RN-1: Sonu Soto    Specimens:   ID Type Source Tests Collected by Time Destination   1 : Jose Molina MD 10/5/2021 1448 Pathology   2 : Cora Molina MD 10/5/2021 1450 Pathology   3 : Yissel Gonzalez MD 10/5/2021 1451 Pathology   4 : BX Preservative Rectum  Michael Hawley MD 10/5/2021 1452 Pathology     H. Pylori  no    Assessment:  Intra-procedure medications     Anesthesia gave intra-procedure sedation and medications, see anesthesia flow sheet yes    Intravenous fluids: NS@ KVO     Vital signs stable     Abdominal assessment: round and soft     Recommendation:  Discharge patient per MD order.   Return to floor  Family or Friend   Permission to share finding with family or friend yes

## 2021-10-05 NOTE — ANESTHESIA PREPROCEDURE EVALUATION
Anesthetic History   No history of anesthetic complications            Review of Systems / Medical History  Patient summary reviewed, nursing notes reviewed and pertinent labs reviewed    Pulmonary  Within defined limits              Comments: Distant smoking hx, quit in 1979   Neuro/Psych   Within defined limits           Cardiovascular    Hypertension        Dysrhythmias : atrial fibrillation  Hyperlipidemia    Exercise tolerance: >4 METS  Comments:  EKG: AFib, rate 98     ECHO: 35-40% EF,mod diffuse hypokinesis, mild TR   GI/Hepatic/Renal         Renal disease (CKD, stage 2): CRI      Comments: Hx of polyps    Hx of crohn's Endo/Other    Diabetes: type 2  Hypothyroidism  Arthritis     Other Findings              Physical Exam    Airway  Mallampati: II  TM Distance: 4 - 6 cm  Neck ROM: normal range of motion   Mouth opening: Normal     Cardiovascular  Regular rate and rhythm,  S1 and S2 normal,  no murmur, click, rub, or gallop             Dental  No notable dental hx       Pulmonary  Breath sounds clear to auscultation               Abdominal  GI exam deferred       Other Findings            Anesthetic Plan    ASA: 3  Anesthesia type: total IV anesthesia and general          Induction: Intravenous  Anesthetic plan and risks discussed with: Patient

## 2021-10-05 NOTE — ANESTHESIA POSTPROCEDURE EVALUATION
Procedure(s):  COLONOSCOPY  COLON BIOPSY. total IV anesthesia    Anesthesia Post Evaluation        Patient location during evaluation: PACU  Note status: Adequate. Level of consciousness: responsive to verbal stimuli and sleepy but conscious  Pain management: satisfactory to patient  Airway patency: patent  Anesthetic complications: no  Cardiovascular status: acceptable  Respiratory status: acceptable  Hydration status: acceptable  Comments: +Post-Anesthesia Evaluation and Assessment    Patient: Sharyle Porter MRN: [id number]  SSN: [id number]   YOB: 1945  Age: 77 y.o. Sex: female      Cardiovascular Function/Vital Signs    /79   Pulse 85   Temp 36.7 °C (98 °F)   Resp 18   Ht 5' 6\" (1.676 m)   Wt 82.6 kg (182 lb 3.2 oz)   SpO2 97%   Breastfeeding No   BMI 29.41 kg/m²     Patient is status post Procedure(s):  COLONOSCOPY  COLON BIOPSY. Nausea/Vomiting: Controlled. Postoperative hydration reviewed and adequate. Pain:  Pain Scale 1: Numeric (0 - 10) (10/05/21 1515)  Pain Intensity 1: 0 (10/05/21 1515)   Managed. Neurological Status: At baseline. Mental Status and Level of Consciousness: Arousable. Pulmonary Status:   O2 Device: None (Room air) (10/05/21 1515)   Adequate oxygenation and airway patent. Complications related to anesthesia: None    Post-anesthesia assessment completed. No concerns.     Signed By: Dipesh Wolfe DO    10/5/2021  Post anesthesia nausea and vomiting:  controlled      INITIAL Post-op Vital signs:   Vitals Value Taken Time   /79 10/05/21 1515   Temp 36.7 °C (98 °F) 10/05/21 1506   Pulse 85 10/05/21 1515   Resp 18 10/05/21 1515   SpO2 97 % 10/05/21 1515

## 2021-10-05 NOTE — H&P
Rusty Manzo MD  [facility], [address] [place], [place] [address]  [phone]  [email or web address]    Gastroenterology Outpatient History and Physical    Patient: [place] Monday    Physician: Margarette Funk MD    Vital Signs: Blood pressure (!) 158/78, pulse 95, temperature 97.8 °F (36.6 °C), resp. rate 21, height 5' 6\" (1.676 m), weight 82.6 kg (182 lb 3.2 oz), SpO2 99 %, not currently breastfeeding. Allergies: Allergies   Allergen Reactions    Lisinopril Cough       Chief Complaint: Screening colonoscopy. Crohn's disease    History of Present Illness: Here for a screening colonoscopy. Last colonoscopy was 2018 and showed inactive Crohn's. Currently has no GI symptoms. No FH of colon cancer or polyps.     History:  Past Medical History:   Diagnosis Date    Acquired hypothyroidism 7/26/2017    CKD (chronic kidney disease) 7/26/2017    [facility]     Diabetes ([facility]     Hypertension     On statin therapy 7/26/2017    Pleurisy 07/17/2019    Post-menopausal 7/26/2017      Past Surgical History:   Procedure Laterality Date    COLONOSCOPY N/A 5/1/2018    COL[facility]COPY performed [facility] Margarette [facility] MD at [facility] ENDOSCOPY    COLONOSCOPY,DIAGNOSTIC  5/1/2018         HX BREAST BIOPSY Right     over 30 years  negative    HX GI      tumor from colon removed    HX ORTHOPAEDIC      right wrist    HX OTHER SURGICAL      Bowel Obstruction    AK BREAST SURGERY PROCEDURE UNLISTED      cyst removed right    AK COLONOSCOPY W/BIOPSY SINGLE/MULTIPLE  4/9/2014           Social History     Socioeconomic History    Marital status:      Spouse name: Not on file    Number of children: Not on file    Years of education: Not on file    Highest education level: Not on file   Tobacco Use    Smoking status: Former Smoker     Packs/day: 1.00     Years: 25.00     Pack years: 25.00     Quit date: 1979 Years since quittin.7    Smokeless tobacco: Never Used   Vaping Use    Vaping Use: Never used   Substance and Sexual Activity    Alcohol use: Yes     Comment: 2 drinks per month     Drug use: No    Sexual activity: Never     Social Determinants of Health     Financial Resource Strain:     Difficulty of Paying Living Expenses:    Food Insecurity:     Worried About Running Out of Food in the Last Year:     Ran Out of Food in the Last Year:    Transportation Needs:     Lack of Transportation (Medical):      Lack of Transportation (Non-Medical):    Physical Activity:     Days of Exercise per Week:     Minutes of Exercise per Session:    Stress:     Feeling of Stress :    Social Connections:     Frequency of Communication with Friends and Family:     Frequency of Social Gatherings with Friends and Family:     Attends Orthodoxy Services:     Active Member of Clubs or Organizations:     Attends Club or Organization Meetings:     Marital Status:       Family History   Problem Relation Age of Onset    Diabetes Mother     Heart Failure Mother     Cancer Father         stomach      Patient Active Problem List   Diagnosis Code    SBO (small bowel obstruction) ([facility] K56.609    [facility] disease ([facility] K50.90    Essential hypertension I10    Controlled type 2 diabetes mellitus with stage 2 chronic kidney disease, without long-term current use of insulin ([facility] E11.22, N18.2    Mixed hyperlipidemia E78.2    Primary osteoarthritis involving multiple joints M89.49    PAF (paroxysmal atrial fibrillation) ([facility] I48.0    Post-menopausal Z78.0    Acquired hypothyroidism E03.9    Stage 2 chronic kidney disease N18.2    Medicare annual wellness visit, subsequent Z00.00    Colon cancer screening Z12.11    Acute bronchitis J20.9    Acute non-recurrent maxillary sinusitis J01.00    Chest pain on breathing R07.1    Pleurisy R09.1    Alcohol screening Z13.39    Edema R60.9    Neck pain M54.2 Medications:   Prior to Admission medications    Medication Sig Start Date End Date Taking? Authorizing Provider   amLODIPine (NORVASC) 2.5 mg tablet TAKE 1 TABLET EVERY DAY 9/13/21  Yes Ondina Sanders MD   nateglinide (STARLIX) 120 mg tablet TAKE 1 TABLET THREE TIMES DAILY BEFORE MEALS 8/13/21  Yes Ondina Sanders MD   metFORMIN ER (GLUCOPHAGE XR) 500 mg tablet TAKE 1 TABLET BY MOUTH TWICE A DAY WITH MEALS 8/10/21  Yes Ondina Sanders MD   diclofenac EC (VOLTAREN) 75 mg EC tablet TAKE 1 TABLET TWICE A DAY WITH FOOD FOR PAIN AND INFLAMMATION 6/14/21  Yes Ondina Sanders MD   levothyroxine (SYNTHROID) 50 mcg tablet TAKE 1 TABLET EVERY DAY 3/18/21  Yes Ondina Sanders MD   hydroCHLOROthiazide (HYDRODIURIL) 25 mg tablet TAKE 1 TABLET BY MOUTH EVERY DAY 10/26/20  Yes Ondina Sanders MD   MULTIVIT,TH IRON,OTHER MIN (COMPLETE MULTIVITAMIN PO) Take 1 Tab by mouth daily. Indications: Complete multivitamin Women's 50+   Yes Other, MD Cordelia   calcium-cholecalciferol, D3, (CALTRATE 600+D) tablet Take 1 Tab by mouth daily. Yes Other, MD Cordelia   GUAIFENESIN/PSEUDOEPHEDRNE HCL (MUCINEX D PO) Take  by mouth as needed.   Patient not taking: Reported on 10/5/2021    Provider, Historical       Physical Exam:     General: well developed, well nourished   HEENT: unremarkable   Heart: regular rhythm no mumur    Lungs: clear   Abdominal:  benign   Neurological: unremarkable   Extremities: no edema     Findings/Diagnosis: Crohn's disease       Plan of Care/Planned Procedure: Colonoscopy with monitored anesthesia care sedation    Signed:  Geneva Harris MD 10/5/2021

## 2021-10-22 ENCOUNTER — OFFICE VISIT (OUTPATIENT)
Dept: INTERNAL MEDICINE CLINIC | Age: 76
End: 2021-10-22
Payer: MEDICARE

## 2021-10-22 VITALS
OXYGEN SATURATION: 98 % | BODY MASS INDEX: 28.93 KG/M2 | SYSTOLIC BLOOD PRESSURE: 136 MMHG | WEIGHT: 184.3 LBS | HEART RATE: 92 BPM | DIASTOLIC BLOOD PRESSURE: 80 MMHG | HEIGHT: 67 IN | TEMPERATURE: 98.8 F

## 2021-10-22 DIAGNOSIS — Z23 NEEDS FLU SHOT: ICD-10-CM

## 2021-10-22 DIAGNOSIS — R09.1 PLEURISY: ICD-10-CM

## 2021-10-22 DIAGNOSIS — J20.9 ACUTE BRONCHITIS, UNSPECIFIED ORGANISM: Primary | ICD-10-CM

## 2021-10-22 PROCEDURE — 1101F PT FALLS ASSESS-DOCD LE1/YR: CPT | Performed by: INTERNAL MEDICINE

## 2021-10-22 PROCEDURE — G8754 DIAS BP LESS 90: HCPCS | Performed by: INTERNAL MEDICINE

## 2021-10-22 PROCEDURE — G8427 DOCREV CUR MEDS BY ELIG CLIN: HCPCS | Performed by: INTERNAL MEDICINE

## 2021-10-22 PROCEDURE — G8510 SCR DEP NEG, NO PLAN REQD: HCPCS | Performed by: INTERNAL MEDICINE

## 2021-10-22 PROCEDURE — G8752 SYS BP LESS 140: HCPCS | Performed by: INTERNAL MEDICINE

## 2021-10-22 PROCEDURE — 99214 OFFICE O/P EST MOD 30 MIN: CPT | Performed by: INTERNAL MEDICINE

## 2021-10-22 PROCEDURE — G8419 CALC BMI OUT NRM PARAM NOF/U: HCPCS | Performed by: INTERNAL MEDICINE

## 2021-10-22 PROCEDURE — G8536 NO DOC ELDER MAL SCRN: HCPCS | Performed by: INTERNAL MEDICINE

## 2021-10-22 PROCEDURE — 3017F COLORECTAL CA SCREEN DOC REV: CPT | Performed by: INTERNAL MEDICINE

## 2021-10-22 PROCEDURE — 90694 VACC AIIV4 NO PRSRV 0.5ML IM: CPT | Performed by: INTERNAL MEDICINE

## 2021-10-22 PROCEDURE — 1090F PRES/ABSN URINE INCON ASSESS: CPT | Performed by: INTERNAL MEDICINE

## 2021-10-22 PROCEDURE — G8399 PT W/DXA RESULTS DOCUMENT: HCPCS | Performed by: INTERNAL MEDICINE

## 2021-10-22 PROCEDURE — G0008 ADMIN INFLUENZA VIRUS VAC: HCPCS | Performed by: INTERNAL MEDICINE

## 2021-10-22 RX ORDER — AZITHROMYCIN 250 MG/1
250 TABLET, FILM COATED ORAL SEE ADMIN INSTRUCTIONS
Qty: 6 TABLET | Refills: 0 | Status: SHIPPED | OUTPATIENT
Start: 2021-10-22 | End: 2021-10-27

## 2021-10-22 RX ORDER — PREDNISONE 5 MG/1
TABLET ORAL
Qty: 21 TABLET | Refills: 0 | Status: SHIPPED | OUTPATIENT
Start: 2021-10-22 | End: 2021-11-15 | Stop reason: ALTCHOICE

## 2021-10-22 RX ORDER — HYDROCHLOROTHIAZIDE 25 MG/1
TABLET ORAL
Qty: 30 TABLET | Refills: 11 | Status: SHIPPED | OUTPATIENT
Start: 2021-10-22 | End: 2022-10-18

## 2021-10-22 NOTE — PATIENT INSTRUCTIONS
Learning About Relief for Back Pain  What is back strain? Back strain is an injury that happens when you overstretch, or pull, a muscle in your back. You may hurt your back in an accident or when you exercise or lift something. Most back pain gets better with rest and time. You can take care of yourself at home to help your back heal.  What can you do first to relieve back pain? When you first feel back pain, try these steps:  · Walk. Take a short walk (10 to 20 minutes) on a level surface (no slopes, hills, or stairs) every 2 to 3 hours. Walk only distances you can manage without pain, especially leg pain. · Relax. Find a comfortable position for rest. Some people are comfortable on the floor or a medium-firm bed with a small pillow under their head and another under their knees. Some people prefer to lie on their side with a pillow between their knees. Don't stay in one position for too long. · Try heat or ice. Try using a heating pad on a low or medium setting, or take a warm shower, for 15 to 20 minutes every 2 to 3 hours. Or you can buy single-use heat wraps that last up to 8 hours. You can also try an ice pack for 10 to 15 minutes every 2 to 3 hours. You can use an ice pack or a bag of frozen vegetables wrapped in a thin towel. There is not strong evidence that either heat or ice will help, but you can try them to see if they help. You may also want to try switching between heat and cold. · Take pain medicine exactly as directed. ? If the doctor gave you a prescription medicine for pain, take it as prescribed. ? If you are not taking a prescription pain medicine, ask your doctor if you can take an over-the-counter medicine. What else can you do? · Stretch and exercise. Exercises that increase flexibility may relieve your pain and make it easier for your muscles to keep your spine in a good, neutral position. And don't forget to keep walking. · Do self-massage.  You can use self-massage to unwind after work or school or to energize yourself in the morning. You can easily massage your feet, hands, or neck. Self-massage works best if you are in comfortable clothes and are sitting or lying in a comfortable position. Use oil or lotion to massage bare skin. · Reduce stress. Back pain can lead to a vicious Pauma: Distress about the pain tenses the muscles in your back, which in turn causes more pain. Learn how to relax your mind and your muscles to lower your stress. Where can you learn more? Go to http://www.gray.com/  Enter Q517 in the search box to learn more about \"Learning About Relief for Back Pain. \"  Current as of: July 1, 2021               Content Version: 13.0  © 2006-2021 [employer]. Care instructions adapted under license by [employer] (which disclaims liability or warranty for this information). If you have questions about a medical condition or this instruction, always ask your healthcare professional. [address] any warranty or liability for your use of this information.

## 2021-10-22 NOTE — PROGRESS NOTES
After obtaining written consent and per orders of Dr. Robert Mckeon, injection of flu vaccine given by Sherif Daniel RN. Order and injection/medication verified by Otis Jaeger MA. Patient tolerated procedure well. VIS was given to them. No reactions noted.

## 2021-10-22 NOTE — PROGRESS NOTES
Subjective:   Maryanne Lundberg is a 77 y.o. female      Chief Complaint   Patient presents with    Breathing Problem        History of present illness: She presents today with cough and congestion with some pleuritic chest pain is been going on for several days. The pleurisy seems very much consistent with previous pleurisy that she is at what hurts to take a deep breath or cough. She notes no fevers or chills. She notes no shortness of breath. She has no other cardiac or respiratory complaints. She has no GI or  complaints. The remainder of complete review of systems is negative.     Patient Active Problem List   Diagnosis Code    SBO (small bowel obstruction) ([facility] K56.609    Cro[facility] disease ([facility] K50.90    Essential hypertension I10    Controlled type 2 diabetes mellitus with stage 2 chronic kidney disease, without long-term current use of insulin ([facility]) E11.22, N18.2    Mixed hyperlipidemia E78.2    Primary osteoarthritis involving multiple joints M89.49    PAF (paroxysmal atrial fibrillation) ([facility]) I48.0    Post-menopausal Z78.0    Acquired hypothyroidism E03.9    Stage 2 chronic kidney disease N18.2    Medicare annual wellness visit, subsequent Z00.00    Colon cancer screening Z12.11    Acute bronchitis J20.9    Acute non-recurrent maxillary sinusitis J01.00    Chest pain on breathing R07.1    Pleurisy R09.1    Alcohol screening Z13.39    Edema R60.9    Neck pain M54.2      Past Medical History:   Diagnosis Date    Acquired hypothyroidism 7/26/2017    CKD (chronic kidney disease) 7/26/2017    Crohn's disease ([facility]     Diabetes ([facility]     Hypertension     On statin therapy 7/26/2017    Pleurisy 07/17/2019    Post-menopausal 7/26/2017      Allergies   Allergen Reactions    Lisinopril Cough      Family History   Problem Relation Age of Onset    Diabetes Mother     Heart Failure Mother     Cancer Father         stomach      Social History     Socioeconomic History    Marital status:      Spouse name: Not on file    Number of children: Not on file    Years of education: Not on file    Highest education level: Not on file   Occupational History    Not on file   Tobacco Use    Smoking status: Former Smoker     Packs/day: 1.00     Years: 25.00     Pack years: 25.00     Quit date:      Years since quittin.8    Smokeless tobacco: Never Used   Vaping Use    Vaping Use: Never used   Substance and Sexual Activity    Alcohol use: Yes     Comment: 2 drinks per month     Drug use: No    Sexual activity: Never   Other Topics Concern    Not on file   Social History Narrative    Not on file     Social Determinants of Health     Financial Resource Strain:     Difficulty of Paying Living Expenses:    Food Insecurity:     Worried About Running Out of Food in the Last Year:     [address] in the Last Year:    Transportation Needs:     Lack of Transportation (Medical):  Lack of Transportation (Non-Medical):    Physical Activity:     Days of Exercise per Week:     Minutes of Exercise per Session:    Stress:     Feeling of Stress :    Social Connections:     Frequency of Communication with Friends and Family:     Frequency of Social Gatherings with Friends and Family:     Attends Mosque Services:     Active Member of Clubs or Organizations:     Attends Club or Organization Meetings:     Marital Status:    Intimate Partner Violence:     Fear of Current or Ex-Partner:     Emotionally Abused:     Physically Abused:     Sexually Abused:      Prior to Admission medications    Medication Sig Start Date End Date Taking? Authorizing Provider   azithromycin (ZITHROMAX) 250 mg tablet Take 1 Tablet by mouth See Admin Instructions for 5 days. Take 2 tablets the first day, then 1 tablet daily for the next four days.  10/22/21 10/27/21 Yes Rupal Interiano MD   predniSONE (STERAPRED) 5 mg dose pack See administration instruction per 5mg dose pack 10/22/21  Yes Rohan Ness MD   amLODIPine (NORVASC) 2.5 mg tablet TAKE 1 TABLET EVERY DAY 9/13/21  Yes Rohan Ness MD   nateglinide (STARLIX) 120 mg tablet TAKE 1 TABLET THREE TIMES DAILY BEFORE MEALS 8/13/21  Yes Rohan Ness MD   metFORMIN ER (GLUCOPHAGE XR) 500 mg tablet TAKE 1 TABLET BY MOUTH TWICE A DAY WITH MEALS 8/10/21  Yes Rohan Ness MD   diclofenac EC (VOLTAREN) 75 mg EC tablet TAKE 1 TABLET TWICE A DAY WITH FOOD FOR PAIN AND INFLAMMATION 6/14/21  Yes Rohan Ness MD   levothyroxine (SYNTHROID) 50 mcg tablet TAKE 1 TABLET EVERY DAY 3/18/21  Yes Rohan Ness MD   hydroCHLOROthiazide (HYDRODIURIL) 25 mg tablet TAKE 1 TABLET BY MOUTH EVERY DAY 10/26/20  Yes Rohan Ness MD   GUAIFENESIN/PSEUDOEPHEDRNE HCL [facility] D PO) Take  by mouth as needed. Yes Provider, Historical   MULTIVIT,TH IRON,OTHER MIN (COMPLETE MULTIVITAMIN PO) Take 1 Tab by mouth daily. Indications: Complete multivitamin Women's 50+   Yes Other, MD Cordelia   calcium-cholecalciferol, D3, (CALTRATE 600+D) tablet Take 1 Tab by mouth daily. Yes Bernice, MD Cordelia        Review of Systems              Constitutional:  She denies fever, weight loss, sweats or fatigue. EYES: No blurred or double vision,               ENT: no nasal congestion, no headache or dizziness. No difficulty with               swallowing, taste, speech or smell. Respiratory:  No cough, wheezing or shortness of breath. No sputum production. Cardiac:  Denies chest pain, palpitations, unexplained indigestion, syncope, edema, PND or orthopnea. GI:  No changes in bowel movements, no abdominal pain, no bloating, anorexia, nausea, vomiting or heartburn. :  No frequency or dysuria. Denies incontinence or sexual dysfunction. Extremities:  No joint pain, stiffness or swelling  Back:.no pain or soreness  Skin:  No recent rashes or mole changes.   Neurological:  No numbness, tingling, burning paresthesias or loss of motor strength. No syncope, dizziness, frequent headaches or memory loss. Hematologic:  No easy bruising  Lymphatic: No lymph node enlargement    Objective:     Vitals:    10/22/21 0853   BP: 136/80   Pulse: 92   Temp: 98.8 °F (37.1 °C)   TempSrc: Oral   SpO2: 98%   Weight: 184 lb 4.8 oz (83.6 kg)   Height: 5' 7\" (1.702 m)   PainSc:   8   PainLoc: Back       Body mass index is 28.87 kg/m². Physical Examination:              General Appearance:  Well-developed, well-nourished, no acute distress. HEENT:      Ears:  The TMs and ear canals were clear. Eyes:  The pupillary responses were normal.  Extraocular muscle function intact. Lids and conjunctiva not injected. Funduscopic exam revealed sharp disc margins. Nares: Clear w/o edema or erythema  Pharynx:  Clear with teeth in good repair. No masses were noted. Neck:  Supple without thyromegaly or adenopathy. No JVD noted. No carotid                bruits. Lungs:  Clear to auscultation and percussion. Cardiac:  Regular rate and rhythm without murmur. PMI not displaced. No gallop, rub or click. Abdominal: Soft, non-tender, no hepata-spleenomegally or masses  Extremities:  No clubbing, cyanosis or edema. Skin:  No rash or unusual mole changes noted. Lymph Nodes:  None felt in the cervical, supraclavicular, axillary or inguinal region. Neurological: . DTRs 2+ and symmetric. Station and gait normal.   Hematologic:   No purpura or petechiae        Assessment/Plan:         1. Acute bronchitis, unspecified organism    2. Pleurisy    3. Needs flu shot        Impressions/Plan:  Impression  1. Acute bronchitis we will treat this with Zithromax  2   Pleurisy prednisone Dosepak  3. Flu shot given today. I will recheck her as previously scheduled next month for her yearly. Follow-up if not resolved.     Orders Placed This Encounter    Influenza Vaccine, QUAD, 65 Yrs +  IM  (Fluad 35083 )    azithromycin (ZITHROMAX) 250 mg tablet    predniSONE (STERAPRED) 5 mg dose pack       Follow-up and Dispositions    · Return At prior scheduled appointment. No results found for any visits on 10/22/21. Kelly Lopez MD    The patient was given after the visit summary the patient verbalized an understanding of the plans and problems as explained.

## 2021-10-22 NOTE — PROGRESS NOTES
Chief Complaint   Patient presents with    Breathing Problem     Patient states having pleurisy for 2 days, having upper back pain, hurts she when breathes  Visit Vitals  /80 (BP 1 Location: Left upper arm, BP Patient Position: Sitting, BP Cuff Size: Adult)   Pulse 92   Temp 98.8 °F (37.1 °C) (Oral)   Ht 5' 7\" (1.702 m)   Wt 184 lb 4.8 oz (83.6 kg)   SpO2 98%   BMI 28.87 kg/m²         1. Have you been to the ER, urgent care clinic since your last visit? Hospitalized since your last visit? No    2. Have you seen or consulted any other health care providers outside of the [address] [place] [address] since your last visit? Include any pap smears or colon screening.  No

## 2021-10-22 NOTE — TELEPHONE ENCOUNTER
RX refill request from the patient/pharmacy. Patient last seen 10- with labs, and next appt. scheduled for 11-  Requested Prescriptions     Pending Prescriptions Disp Refills    hydroCHLOROthiazide (HYDRODIURIL) 25 mg tablet [Pharmacy Med Name: HYDROCHLOROTHIAZIDE 25MG] 30 Tablet 11     Sig: TAKE 1 TABLET BY MOUTH EVERY DAY   .

## 2021-11-12 NOTE — PROGRESS NOTES
This is a Subsequent Medicare Annual Wellness Visit providing Personalized Prevention Plan Services (PPPS) (Performed 12 months after initial AWV and PPPS )    I have reviewed the patient's medical history in detail and updated the computerized patient record. She presents today for Medicare subsequent annual wellness examination and screening questionnaire. She is also in follow-up of her multiple medical problems include hypertension, diabetes, hyperlipidemia, history of paroxysmal atrial fibrillation, Crohn's disease with history of small bowel obstruction, DJD, CKD stage II, and other multiple medical problems. She is taking her medications and trying to follow her diet and try and remain physically active. She currently denies any chest pain, shortness of breath, palpitations, PND, orthopnea or other cardiac or respiratory complaints. She notes no current GI or  complaints. She notes no headaches, dizziness or neurologic complaints. There are no current active arthritic complaints and she has no other complaints on complete review of systems.     History     Past Medical History:   Diagnosis Date    Acquired hypothyroidism 7/26/2017    CKD (chronic kidney disease) 7/26/2017    [facility]'s [facility] ([facility]     Diabetes ([facility]     Hypertension     On statin therapy 7/26/2017    Pleurisy 07/17/2019    Post-menopausal 7/26/2017      Past Surgical History:   Procedure Laterality Date    COLONOSCOPY N/A 5/1/2018    COLONOSCOPY performed by Lavonna Cheadle., MD at [facility]OSCOPY    COLONOSCOPY N/A 10/5/2021    COL[facility]n [facility]  5/1/2018         COLONOSCOPY,DIAGNOSTIC  10/5/2021         COLONOSCOPY,DIAGNOSTIC  10/8/2021         HX BREAST BIOPSY Right     over 30 years  negative    HX GI      tumor from colon removed    HX ORTHOPAEDIC      right wrist    HX OTHER SURGICAL      Bowel Obstruction    [place] BREAST SURGERY PROCEDURE UNLISTED      cyst removed right    NE COLONOSCOPY W/BIOPSY SINGLE/MULTIPLE  2014          Social History     Tobacco Use    Smoking status: Former Smoker     Packs/day: 1.00     Years: 25.00     Pack years: 25.00     Quit date:      Years since quittin.9    Smokeless tobacco: Never Used   Vaping Use    Vaping Use: Never used   Substance Use Topics    Alcohol use: Yes     Comment: 2 drinks per month     Drug use: No     Current Outpatient Medications   Medication Sig Dispense Refill    hydroCHLOROthiazide (HYDRODIURIL) 25 mg tablet TAKE 1 TABLET BY MOUTH EVERY DAY 30 Tablet 11    amLODIPine (NORVASC) 2.5 mg tablet TAKE 1 TABLET EVERY DAY 30 Tablet 11    nateglinide (STARLIX) 120 mg tablet TAKE 1 TABLET THREE TIMES DAILY BEFORE MEALS 90 Tablet 11    metFORMIN ER (GLUCOPHAGE XR) 500 mg tablet TAKE 1 TABLET BY MOUTH TWICE A DAY WITH MEALS 180 Tablet 3    diclofenac EC (VOLTAREN) 75 mg EC tablet TAKE 1 TABLET TWICE A DAY WITH FOOD FOR PAIN AND INFLAMMATION 60 Tablet 10    levothyroxine (SYNTHROID) 50 mcg tablet TAKE 1 TABLET EVERY DAY 30 Tab 11    GUAIFENESIN/PSEUDOEPHEDRNE HCL (MUCINEX D PO) Take  by mouth as needed.  MULTIVIT,TH IRON,OTHER MIN (COMPLETE MULTIVITAMIN PO) Take 1 Tab by mouth daily. Indications: Complete multivitamin Women's 50+      calcium-cholecalciferol, D3, (CALTRATE 600+D) tablet Take 1 Tab by mouth daily.        Allergies   Allergen Reactions    Lisinopril Cough     Family History   Problem Relation Age of Onset    Diabetes Mother     Heart Failure Mother     Cancer Father         stomach       Patient Active Problem List    Diagnosis    Stage 2 chronic kidney disease    PAF (paroxysmal atrial fibrillation) (HCC)     Self Limiting      Essential hypertension    Controlled type 2 diabetes mellitus with stage 2 chronic kidney disease, without long-term current use of insulin ([facility]    Mixed hyperlipidemia    Primary osteoarthritis involving multiple joints    Acquired hypothyroidism    Neck pain    Edema    Alcohol screening    Pleurisy    Chest pain on breathing    Acute non-recurrent maxillary sinusitis    Acute bronchitis    Medicare annual wellness visit, subsequent    Colon cancer screening    Post-menopausal    SBO (small bowel obstruction) ([facility]    Crohn disease ([facility]       Patient Care Team:  Rakesh Reed MD as PCP - General (Internal Medicine)  Rakesh Reed MD as PCP - [facility]    Depression Risk Factor Screening:     3 most recent PHQ Screens 11/15/2021   Little interest or pleasure in doing things Not at all   Feeling down, depressed, irritable, or hopeless Not at all   Total Score PHQ 2 0     Alcohol Risk Factor Screening:   Do you average more than 1 drink per night or more than 7 drinks a week:  No    On any one occasion in the past three months have you have had more than 3 drinks containing alcohol:  No    Functional Ability and Level of Safety:     Fall Risk     Fall Risk Assessment, last 12 mths 11/15/2021   Able to walk? Yes   Fall in past 12 months? 0   Do you feel unsteady? 0   Are you worried about falling 0       Hearing Loss   mild    Activities of Daily Living   Self-care.    ADL Assessment 11/15/2021   Feeding yourself No Help Needed   Getting from bed to chair No Help Needed   Getting dressed No Help Needed   Bathing or showering No Help Needed   Walk across the room (includes cane/walker) No Help Needed   Using the telphone No Help Needed   Taking your medications No Help Needed   Preparing meals No Help Needed   Managing money (expenses/bills) No Help Needed   Moderately strenuous housework (laundry) No Help Needed   Shopping for personal items (toiletries/medicines) No Help Needed   Shopping for groceries No Help Needed   Driving No Help Needed   Climbing a flight of stairs No Help Needed   Getting to places beyond walking distances No Help Needed       Abuse Screen   Patient is not abused    Social History     Social History Narrative    Not on file       Review of Systems      ROS:    Constitutional: He denies fevers, weight loss, sweats. Eyes: No blurred or double vision. ENT: No difficulty with swallowing, taste, speech or smell. Neck: no stiffness or swelling  Respiratory: No cough wheezing or shortness of breath. Cardiovascular: Denies chest pain, palpitations, unexplained indigestion or syncope. Gastrointestinal:  No changes in bowel movements, no abdominal pain, no bloating. Genitourinary:  He denies frequency, nocturia or stranguria. Extremities: No joint pain, stiffness or swelling. Neurological:  No numbness, tingling, burring paresthesias or loss of motor strength. No syncope, dizziness or frequent headache  Lymphatic: no adenopathy noted  Hematologic: no easy bruising or bleeding gums  Skin:  No recent rashes or mole changes. Psychiatric/Behavioral:  Negative for depression. Physical Examination     Evaluation of Cognitive Function:  Mood/affect:  happy  Appearance: age appropriate  Family member/caregiver input: None    Vitals:    11/15/21 1037   BP: 120/78   Pulse: 86   Resp: 16   Temp: 98.2 °F (36.8 °C)   TempSrc: Oral   SpO2: 98%   Weight: 184 lb 8 oz (83.7 kg)   Height: 5' 7\" (1.702 m)   PainSc:   0 - No pain        PHYSICAL EXAM:    General appearance - alert, well appearing, and in no distress  Mental status - alert, oriented to person, place, and time  HEENT:  Ears - bilateral TM's and external ear canals clear  Eyes - pupillary responses were normal.  Extraocular muscle function intact. Lids and conjunctiva not injected. Fundoscopic exam revealed sharp disc margins. eye movements intact  Pharynx- clear with teeth in good repair. No masses were noted  Neck - supple without thyromegaly or burit. No JVD noted  Lungs - clear to auscultation and percussion  Cardiac- normal rate, regular rhythm without murmurs. PMI not displaced.   No gallop, rub or click  Breast: deferred to GYN  Abdomen - flat, soft, non-tender without palpable organomegaly or mass. No pulsatile mass was felt, and not bruit was heard. Bowel sounds were active   Female - deferred to GYN  Rectal - deferred to GYN  Extremities -  no clubbing cyanosis or edema  Lymphatics - no palpable lymphadenopathy, no hepatosplenomegaly  Peripheral vascular - Dorsalis pedis and posterior tibial pulses felt without difficulty  Skin - no rash or unusual mole change noted  Neurological - Cranial nerves II-XII grossly intact. Motor strength 5/5. DTR's 2+ and symmetric. Station and gait normal  Back exam - full range of motion, no tenderness, palpable spasm or pain on motion  Musculoskeletal - no joint tenderness, deformity or swelling  Hematologic: no purpura, petechiae or bruising    Results for orders placed or performed during the hospital encounter of 10/01/21   SARS-COV-2   Result Value Ref Range    Specimen source Nasopharyngeal      SARS-CoV-2 Not detected NOTD         Advice/Referrals/Counseling   Education and counseling provided:  Are appropriate based on today's review and evaluation  End-of-Life planning (with patient's consent)  Pneumococcal Vaccine  Influenza Vaccine  Colorectal cancer screening tests      Assessment/Plan     ASSESSMENT:   1. Essential hypertension    2. Controlled type 2 diabetes mellitus with stage 2 chronic kidney disease, without long-term current use of insulin ([address]    3. Mixed hyperlipidemia    4. Primary osteoarthritis involving multiple joints    5. Stage 2 chronic kidney disease    6. Acquired hypothyroidism    7. PAF (paroxysmal atrial fibrillation) ([address]    8. Crohn's disease of both small and large intestine without complication ([address]    9. SBO (small bowel obstruction) ([address]    10. Alcohol screening    11. Medicare annual wellness visit, subsequent      Impression  1. Hypertension that is controlled so continue current therapy reviewed with her.   2.  Diabetes repeat status pending and prior lab review not make adjustments if necessary. 3.  Hyperlipidemia prior lab reviewed repeat status pending I will adjust if needed. 4. DJD that is stable   5  CKD stage II repeat status pending repeat status pending  6. Hypothyroidism repeat status is pending  7. Paroxysmal atrial fibrillation no recent symptoms. EKG obtained today normal sinus rhythm. 8.  Crohn's disease stable with no recent flares  9. Small bowel obstruction in the past no evidence of recurrence  10. Annual alcohol screening is done and she does drink some socially but very rarely. She never drinks more than 1 drink per time. I did caution regarding females of more than 1 drink per day with increased cardiovascular risk and increased risk of liver disease and other GI effects. 5 minutes spent discussing alcohol abuse syndrome. Medicare subsequent annual wellness examination screening questionnaires completed today. The results were reviewed with her and her questions were answered. Lifestyle recommendations and modifications discussed and made. I will call the lab results and make further recommendations or adjustments if necessary. Follow-up scheduled again for 3 months or sooner if there is a problem. PLAN:  .  Orders Placed This Encounter    CBC WITH AUTOMATED DIFF    METABOLIC PANEL, COMPREHENSIVE    LIPID PANEL    CK    HEMOGLOBIN A1C WITH EAG    T4 (THYROXINE)    TSH 3RD GENERATION    URINALYSIS W/ REFLEX CULTURE    AMB POC URINE, MICROALBUMIN, SEMIQUANT (1 RESULT)    AMB POC EKG ROUTINE W/ 12 LEADS, INTER & REP         ATTENTION:   This medical record was transcribed using an electronic medical records system. Although proofread, it may and can contain electronic and spelling errors. Other human spelling and other errors may be present. Corrections may be executed at a later time. Please feel free to contact us for any clarifications as needed.             Tuan Pryor, MD    Recommended healthy diet low in carbohydrates, fats, sodium and cholesterol. Recommended regular cardiovascular exercise 3-6 times per week for 30-60 minutes daily. Current Outpatient Medications   Medication Sig Dispense Refill    hydroCHLOROthiazide (HYDRODIURIL) 25 mg tablet TAKE 1 TABLET BY MOUTH EVERY DAY 30 Tablet 11    amLODIPine (NORVASC) 2.5 mg tablet TAKE 1 TABLET EVERY DAY 30 Tablet 11    nateglinide (STARLIX) 120 mg tablet TAKE 1 TABLET THREE TIMES DAILY BEFORE MEALS 90 Tablet 11    metFORMIN ER (GLUCOPHAGE XR) 500 mg tablet TAKE 1 TABLET BY MOUTH TWICE A DAY WITH MEALS 180 Tablet 3    diclofenac EC (VOLTAREN) 75 mg EC tablet TAKE 1 TABLET TWICE A DAY WITH FOOD FOR PAIN AND INFLAMMATION 60 Tablet 10    levothyroxine (SYNTHROID) 50 mcg tablet TAKE 1 TABLET EVERY DAY 30 Tab 11    GUAIFENESIN/PSEUDOEPHEDRNE HCL (MUCINEX D PO) Take  by mouth as needed.  MULTIVIT,TH IRON,OTHER MIN (COMPLETE MULTIVITAMIN PO) Take 1 Tab by mouth daily. Indications: Complete multivitamin Women's 50+      calcium-cholecalciferol, D3, (CALTRATE 600+D) tablet Take 1 Tab by mouth daily. No results found for any visits on 11/15/21. Verbal and written instructions (see AVS) provided. Patient expresses understanding of diagnosis and treatment plan.     Lili Sims MD

## 2021-11-15 ENCOUNTER — OFFICE VISIT (OUTPATIENT)
Dept: INTERNAL MEDICINE CLINIC | Age: 76
End: 2021-11-15
Payer: MEDICARE

## 2021-11-15 VITALS
TEMPERATURE: 98.2 F | WEIGHT: 184.5 LBS | RESPIRATION RATE: 16 BRPM | OXYGEN SATURATION: 98 % | BODY MASS INDEX: 28.96 KG/M2 | DIASTOLIC BLOOD PRESSURE: 78 MMHG | HEIGHT: 67 IN | SYSTOLIC BLOOD PRESSURE: 120 MMHG | HEART RATE: 86 BPM

## 2021-11-15 DIAGNOSIS — I48.0 PAF (PAROXYSMAL ATRIAL FIBRILLATION) (HCC): ICD-10-CM

## 2021-11-15 DIAGNOSIS — M15.9 PRIMARY OSTEOARTHRITIS INVOLVING MULTIPLE JOINTS: ICD-10-CM

## 2021-11-15 DIAGNOSIS — E03.9 ACQUIRED HYPOTHYROIDISM: ICD-10-CM

## 2021-11-15 DIAGNOSIS — I10 ESSENTIAL HYPERTENSION: Primary | ICD-10-CM

## 2021-11-15 DIAGNOSIS — E78.2 MIXED HYPERLIPIDEMIA: ICD-10-CM

## 2021-11-15 DIAGNOSIS — E11.22 CONTROLLED TYPE 2 DIABETES MELLITUS WITH STAGE 2 CHRONIC KIDNEY DISEASE, WITHOUT LONG-TERM CURRENT USE OF INSULIN (HCC): ICD-10-CM

## 2021-11-15 DIAGNOSIS — K50.80 CROHN'S DISEASE OF BOTH SMALL AND LARGE INTESTINE WITHOUT COMPLICATION (HCC): ICD-10-CM

## 2021-11-15 DIAGNOSIS — N18.2 STAGE 2 CHRONIC KIDNEY DISEASE: ICD-10-CM

## 2021-11-15 DIAGNOSIS — K56.609 SBO (SMALL BOWEL OBSTRUCTION) (HCC): ICD-10-CM

## 2021-11-15 DIAGNOSIS — N18.2 CONTROLLED TYPE 2 DIABETES MELLITUS WITH STAGE 2 CHRONIC KIDNEY DISEASE, WITHOUT LONG-TERM CURRENT USE OF INSULIN (HCC): ICD-10-CM

## 2021-11-15 DIAGNOSIS — Z13.39 ALCOHOL SCREENING: ICD-10-CM

## 2021-11-15 DIAGNOSIS — Z00.00 MEDICARE ANNUAL WELLNESS VISIT, SUBSEQUENT: ICD-10-CM

## 2021-11-15 LAB
ALBUMIN SERPL-MCNC: 4 G/DL (ref 3.5–5)
ALBUMIN/GLOB SERPL: 1.2 {RATIO} (ref 1.1–2.2)
ALP SERPL-CCNC: 78 U/L (ref 45–117)
ALT SERPL-CCNC: 32 U/L (ref 12–78)
ANION GAP SERPL CALC-SCNC: 4 MMOL/L (ref 5–15)
APPEARANCE UR: ABNORMAL
AST SERPL-CCNC: 21 U/L (ref 15–37)
BACTERIA URNS QL MICRO: NEGATIVE /HPF
BASOPHILS # BLD: 0.1 K/UL (ref 0–0.1)
BASOPHILS NFR BLD: 1 % (ref 0–1)
BILIRUB SERPL-MCNC: 0.6 MG/DL (ref 0.2–1)
BILIRUB UR QL: NEGATIVE
BUN SERPL-MCNC: 14 MG/DL (ref 6–20)
BUN/CREAT SERPL: 19 (ref 12–20)
CALCIUM SERPL-MCNC: 9.4 MG/DL (ref 8.5–10.1)
CHLORIDE SERPL-SCNC: 105 MMOL/L (ref 97–108)
CHOLEST SERPL-MCNC: 228 MG/DL
CK SERPL-CCNC: 252 U/L (ref 26–192)
CO2 SERPL-SCNC: 29 MMOL/L (ref 21–32)
COLOR UR: ABNORMAL
CREAT SERPL-MCNC: 0.73 MG/DL (ref 0.55–1.02)
DIFFERENTIAL METHOD BLD: ABNORMAL
EOSINOPHIL # BLD: 0.9 K/UL (ref 0–0.4)
EOSINOPHIL NFR BLD: 11 % (ref 0–7)
EPITH CASTS URNS QL MICRO: ABNORMAL /LPF
ERYTHROCYTE [DISTWIDTH] IN BLOOD BY AUTOMATED COUNT: 13.4 % (ref 11.5–14.5)
EST. AVERAGE GLUCOSE BLD GHB EST-MCNC: 123 MG/DL
GLOBULIN SER CALC-MCNC: 3.4 G/DL (ref 2–4)
GLUCOSE SERPL-MCNC: 114 MG/DL (ref 65–100)
GLUCOSE UR STRIP.AUTO-MCNC: NEGATIVE MG/DL
HBA1C MFR BLD: 5.9 % (ref 4–5.6)
HCT VFR BLD AUTO: 39.5 % (ref 35–47)
HDLC SERPL-MCNC: 67 MG/DL
HDLC SERPL: 3.4 {RATIO} (ref 0–5)
HGB BLD-MCNC: 13 G/DL (ref 11.5–16)
HGB UR QL STRIP: NEGATIVE
IMM GRANULOCYTES # BLD AUTO: 0 K/UL (ref 0–0.04)
IMM GRANULOCYTES NFR BLD AUTO: 0 % (ref 0–0.5)
KETONES UR QL STRIP.AUTO: NEGATIVE MG/DL
LDLC SERPL CALC-MCNC: 94.8 MG/DL (ref 0–100)
LEUKOCYTE ESTERASE UR QL STRIP.AUTO: NEGATIVE
LYMPHOCYTES # BLD: 3 K/UL (ref 0.8–3.5)
LYMPHOCYTES NFR BLD: 36 % (ref 12–49)
MCH RBC QN AUTO: 31.7 PG (ref 26–34)
MCHC RBC AUTO-ENTMCNC: 32.9 G/DL (ref 30–36.5)
MCV RBC AUTO: 96.3 FL (ref 80–99)
MICROALBUMIN UR TEST STR-MCNC: 10 MG/L (ref 0–20)
MONOCYTES # BLD: 0.5 K/UL (ref 0–1)
MONOCYTES NFR BLD: 6 % (ref 5–13)
NEUTS SEG # BLD: 3.9 K/UL (ref 1.8–8)
NEUTS SEG NFR BLD: 46 % (ref 32–75)
NITRITE UR QL STRIP.AUTO: NEGATIVE
NRBC # BLD: 0 K/UL (ref 0–0.01)
NRBC BLD-RTO: 0 PER 100 WBC
PH UR STRIP: 5 [PH] (ref 5–8)
PLATELET # BLD AUTO: 276 K/UL (ref 150–400)
PMV BLD AUTO: 9.4 FL (ref 8.9–12.9)
POTASSIUM SERPL-SCNC: 4.5 MMOL/L (ref 3.5–5.1)
PROT SERPL-MCNC: 7.4 G/DL (ref 6.4–8.2)
PROT UR STRIP-MCNC: NEGATIVE MG/DL
RBC # BLD AUTO: 4.1 M/UL (ref 3.8–5.2)
RBC #/AREA URNS HPF: ABNORMAL /HPF (ref 0–5)
SODIUM SERPL-SCNC: 138 MMOL/L (ref 136–145)
SP GR UR REFRACTOMETRY: >1.03 (ref 1–1.03)
T4 SERPL-MCNC: 11 UG/DL (ref 4.8–13.9)
TRIGL SERPL-MCNC: 331 MG/DL (ref ?–150)
TSH SERPL DL<=0.05 MIU/L-ACNC: 3.11 UIU/ML (ref 0.36–3.74)
UA: UC IF INDICATED,UAUC: ABNORMAL
UROBILINOGEN UR QL STRIP.AUTO: 0.2 EU/DL (ref 0.2–1)
VLDLC SERPL CALC-MCNC: 66.2 MG/DL
WBC # BLD AUTO: 8.4 K/UL (ref 3.6–11)
WBC URNS QL MICRO: ABNORMAL /HPF (ref 0–4)

## 2021-11-15 PROCEDURE — 1101F PT FALLS ASSESS-DOCD LE1/YR: CPT | Performed by: INTERNAL MEDICINE

## 2021-11-15 PROCEDURE — 3044F HG A1C LEVEL LT 7.0%: CPT | Performed by: INTERNAL MEDICINE

## 2021-11-15 PROCEDURE — 82044 UR ALBUMIN SEMIQUANTITATIVE: CPT | Performed by: INTERNAL MEDICINE

## 2021-11-15 PROCEDURE — G8419 CALC BMI OUT NRM PARAM NOF/U: HCPCS | Performed by: INTERNAL MEDICINE

## 2021-11-15 PROCEDURE — G8536 NO DOC ELDER MAL SCRN: HCPCS | Performed by: INTERNAL MEDICINE

## 2021-11-15 PROCEDURE — 93000 ELECTROCARDIOGRAM COMPLETE: CPT | Performed by: INTERNAL MEDICINE

## 2021-11-15 PROCEDURE — G8754 DIAS BP LESS 90: HCPCS | Performed by: INTERNAL MEDICINE

## 2021-11-15 PROCEDURE — 2022F DILAT RTA XM EVC RTNOPTHY: CPT | Performed by: INTERNAL MEDICINE

## 2021-11-15 PROCEDURE — G8427 DOCREV CUR MEDS BY ELIG CLIN: HCPCS | Performed by: INTERNAL MEDICINE

## 2021-11-15 PROCEDURE — 1090F PRES/ABSN URINE INCON ASSESS: CPT | Performed by: INTERNAL MEDICINE

## 2021-11-15 PROCEDURE — G8510 SCR DEP NEG, NO PLAN REQD: HCPCS | Performed by: INTERNAL MEDICINE

## 2021-11-15 PROCEDURE — G8752 SYS BP LESS 140: HCPCS | Performed by: INTERNAL MEDICINE

## 2021-11-15 PROCEDURE — 3017F COLORECTAL CA SCREEN DOC REV: CPT | Performed by: INTERNAL MEDICINE

## 2021-11-15 PROCEDURE — 99214 OFFICE O/P EST MOD 30 MIN: CPT | Performed by: INTERNAL MEDICINE

## 2021-11-15 PROCEDURE — G0439 PPPS, SUBSEQ VISIT: HCPCS | Performed by: INTERNAL MEDICINE

## 2021-11-15 PROCEDURE — G8399 PT W/DXA RESULTS DOCUMENT: HCPCS | Performed by: INTERNAL MEDICINE

## 2021-11-15 NOTE — PATIENT INSTRUCTIONS

## 2021-11-15 NOTE — PROGRESS NOTES
HIPAA verified by two patient identifiers. Bernadette Gallegos is a 77 y.o. female    Chief Complaint   Patient presents with   [facility] Self Annual Wellness Visit       Visit Vitals  /78 (BP 1 Location: Left upper arm, BP Patient Position: Sitting, BP Cuff Size: Large adult)   Pulse 86   Temp 98.2 °F (36.8 °C) (Oral)   Resp 16   Ht 5' 7\" (1.702 m)   Wt 184 lb 8 oz (83.7 kg)   SpO2 98%   BMI 28.90 kg/m²       Pain Scale: 0 - No pain/10  Pain Location:       Health Maintenance Due   Topic Date Due    DTaP/Tdap/Td series (1 - Tdap) Never done    COVID-19 Vaccine (3 - Booster for Moderna series) 08/28/2021    MICROALBUMIN Q1  11/04/2021    Medicare Yearly Exam  11/05/2021         Coordination of Care Questionnaire:  :   1) Have you been to an emergency room, urgent care, or hospitalized since your last visit? If yes, where when, and reason for visit? no       2. Have seen or consulted any other health care provider since your last visit? If yes, where when, and reason for visit? NO      Patient is accompanied by self I have received verbal consent from Bernadette Gallegos to discuss any/all medical information while they are present in the room.

## 2021-11-17 NOTE — PROGRESS NOTES
Labs are okay except for increased total cholesterol and triglycerides so add to fish oil tablets daily.

## 2021-11-18 NOTE — PROGRESS NOTES
Labs are okay except for increased total cholesterol and triglycerides so add to fish oil tablets daily. Discussed with patient.

## 2022-01-04 PROBLEM — Z01.810 PRE-OPERATIVE CARDIOVASCULAR EXAMINATION: Status: ACTIVE | Noted: 2022-01-04

## 2022-01-05 ENCOUNTER — OFFICE VISIT (OUTPATIENT)
Dept: INTERNAL MEDICINE CLINIC | Age: 77
End: 2022-01-05
Payer: MEDICARE

## 2022-01-05 VITALS
HEART RATE: 96 BPM | DIASTOLIC BLOOD PRESSURE: 76 MMHG | SYSTOLIC BLOOD PRESSURE: 138 MMHG | WEIGHT: 188.2 LBS | HEIGHT: 67 IN | BODY MASS INDEX: 29.54 KG/M2 | OXYGEN SATURATION: 97 % | RESPIRATION RATE: 16 BRPM | TEMPERATURE: 98 F

## 2022-01-05 DIAGNOSIS — N18.2 STAGE 2 CHRONIC KIDNEY DISEASE: ICD-10-CM

## 2022-01-05 DIAGNOSIS — I48.0 PAF (PAROXYSMAL ATRIAL FIBRILLATION) (HCC): ICD-10-CM

## 2022-01-05 DIAGNOSIS — E11.22 CONTROLLED TYPE 2 DIABETES MELLITUS WITH STAGE 2 CHRONIC KIDNEY DISEASE, WITHOUT LONG-TERM CURRENT USE OF INSULIN (HCC): ICD-10-CM

## 2022-01-05 DIAGNOSIS — I10 ESSENTIAL HYPERTENSION: ICD-10-CM

## 2022-01-05 DIAGNOSIS — Z01.810 PRE-OPERATIVE CARDIOVASCULAR EXAMINATION: Primary | ICD-10-CM

## 2022-01-05 DIAGNOSIS — K50.80 CROHN'S DISEASE OF BOTH SMALL AND LARGE INTESTINE WITHOUT COMPLICATION (HCC): ICD-10-CM

## 2022-01-05 DIAGNOSIS — N18.2 CONTROLLED TYPE 2 DIABETES MELLITUS WITH STAGE 2 CHRONIC KIDNEY DISEASE, WITHOUT LONG-TERM CURRENT USE OF INSULIN (HCC): ICD-10-CM

## 2022-01-05 PROBLEM — M20.42 HAMMER TOE OF SECOND TOE OF LEFT FOOT: Status: ACTIVE | Noted: 2022-01-05

## 2022-01-05 PROCEDURE — G8754 DIAS BP LESS 90: HCPCS | Performed by: INTERNAL MEDICINE

## 2022-01-05 PROCEDURE — G8399 PT W/DXA RESULTS DOCUMENT: HCPCS | Performed by: INTERNAL MEDICINE

## 2022-01-05 PROCEDURE — 1101F PT FALLS ASSESS-DOCD LE1/YR: CPT | Performed by: INTERNAL MEDICINE

## 2022-01-05 PROCEDURE — G8752 SYS BP LESS 140: HCPCS | Performed by: INTERNAL MEDICINE

## 2022-01-05 PROCEDURE — 2022F DILAT RTA XM EVC RTNOPTHY: CPT | Performed by: INTERNAL MEDICINE

## 2022-01-05 PROCEDURE — 3046F HEMOGLOBIN A1C LEVEL >9.0%: CPT | Performed by: INTERNAL MEDICINE

## 2022-01-05 PROCEDURE — G8427 DOCREV CUR MEDS BY ELIG CLIN: HCPCS | Performed by: INTERNAL MEDICINE

## 2022-01-05 PROCEDURE — G8536 NO DOC ELDER MAL SCRN: HCPCS | Performed by: INTERNAL MEDICINE

## 2022-01-05 PROCEDURE — 99214 OFFICE O/P EST MOD 30 MIN: CPT | Performed by: INTERNAL MEDICINE

## 2022-01-05 PROCEDURE — G8510 SCR DEP NEG, NO PLAN REQD: HCPCS | Performed by: INTERNAL MEDICINE

## 2022-01-05 PROCEDURE — 1090F PRES/ABSN URINE INCON ASSESS: CPT | Performed by: INTERNAL MEDICINE

## 2022-01-05 PROCEDURE — G8419 CALC BMI OUT NRM PARAM NOF/U: HCPCS | Performed by: INTERNAL MEDICINE

## 2022-01-05 PROCEDURE — 3017F COLORECTAL CA SCREEN DOC REV: CPT | Performed by: INTERNAL MEDICINE

## 2022-01-05 NOTE — PROGRESS NOTES
HIPAA verified by two patient identifiers. Henri Lennox is a 77 y.o. female    No chief complaint on file. Visit Vitals  /76 (BP 1 Location: Left upper arm, BP Patient Position: Sitting, BP Cuff Size: Large adult)   Pulse 96   Temp 98 °F (36.7 °C) (Oral)   Resp 16   Ht 5' 7\" (1.702 m)   Wt 188 lb 3.2 oz (85.4 kg)   SpO2 97%   BMI 29.48 kg/m²       Pain Scale: 0 - No pain/10  Pain Location:       Health Maintenance Due   Topic Date Due    DTaP/Tdap/Td series (1 - Tdap) Never done    COVID-19 Vaccine (3 - Booster for Moderna series) 08/28/2021    Foot Exam Q1  02/04/2022         Coordination of Care Questionnaire:  :   1) Have you been to an emergency room, urgent care, or hospitalized since your last visit? If yes, where when, and reason for visit? no       2. Have seen or consulted any other health care provider since your last visit? If yes, where when, and reason for visit? NO      Patient is accompanied by self I have received verbal consent from Henri Lennox to discuss any/all medical information while they are present in the room.

## 2022-01-05 NOTE — PROGRESS NOTES
No chief complaint on file. HPI:     Liberty John is a 77y.o. year old female who is here for preoperative medical consultation and clearance before planned left second toe hammertoe surgery with an osteotomy and fusion with pinning to be done on 1/18/2022 by Dr. Violeta Farrell at [facility]. She is followed regularly by me for hypertension, diabetes, hyperlipidemia, paroxysmal atrial fibrillation, Crohn's disease, DJD and other medical problems. She currently denies any chest pain, shortness of breath, palpitations, PND, orthopnea or other cardiac or respiratory complaints. She notes no GI or  complaints. She notes no headaches, dizziness or neurologic complaints. No current active arthritic complaints other than that related to the hammertoe and there are no other complaints on complete review of systems.              Visit Vitals  /76 (BP 1 Location: Left upper arm, BP Patient Position: Sitting, BP Cuff Size: Large adult)   Pulse 96   Temp 98 °F (36.7 °C) (Oral)   Resp 16   Ht 5' 7\" (1.702 m)   Wt 188 lb 3.2 oz (85.4 kg)   SpO2 97%   BMI 29.48 kg/m²       Past Medical History:   Diagnosis Date    Acquired hypothyroidism 7/26/2017    CKD (chronic kidney disease) 7/26/2017    [facility]     Diabetes ([facility]     Hypertension     On statin therapy 7/26/2017    Pleurisy 07/17/2019    Post-menopausal 7/26/2017       Past Surgical History:   Procedure Laterality Date    COLONOSCOPY N/A 5/1/2018    COL[facility] Cal Hyde [facility]COPY    COLONOSCOPY N/A 10/5/2021    COL[facility]hav[facility] at [facility]  5/1/2018         COLONOSCOPY,DIAGNOSTIC  10/5/2021         COLONOSCOPY,DIAGNOSTIC  10/8/2021         HX BREAST BIOPSY Right     over 30 years  negative    HX GI      tumor from colon removed    HX ORTHOPAEDIC      right wrist    HX OTHER SURGICAL      Bowel Obstruction    [place] BREAST SURGERY PROCEDURE UNLISTED      cyst removed right    [place] COLONOSCOPY W/BIOPSY SINGLE/MULTIPLE  2014            Prior to Admission medications    Medication Sig Start Date End Date Taking? Authorizing Provider   hydroCHLOROthiazide (HYDRODIURIL) 25 mg tablet TAKE 1 TABLET BY MOUTH EVERY DAY 10/22/21  Yes Flip Kruger MD   amLODIPine (NORVASC) 2.5 mg tablet TAKE 1 TABLET EVERY DAY 21  Yes Flip Kruger MD   nateglinide (STARLIX) 120 mg tablet TAKE 1 TABLET THREE TIMES DAILY BEFORE MEALS 21  Yes Flip Kruger MD   metFORMIN ER (GLUCOPHAGE XR) 500 mg tablet TAKE 1 TABLET BY MOUTH TWICE A DAY WITH MEALS 8/10/21  Yes Flip Kruger MD   diclofenac EC (VOLTAREN) 75 mg EC tablet TAKE 1 TABLET TWICE A DAY WITH FOOD FOR PAIN AND INFLAMMATION 21  Yes Flip Kruger MD   levothyroxine (SYNTHROID) 50 mcg tablet TAKE 1 TABLET EVERY DAY 3/18/21  Yes Flip Kruger MD   GUAIFENESIN/PSEUDOEPHEDRNE HCL [facility] D PO) Take  by mouth as needed. Yes Provider, Historical   MULTIVIT,TH IRON,OTHER MIN (COMPLETE MULTIVITAMIN PO) Take 1 Tab by mouth daily. Indications: Complete multivitamin Women's 50+   Yes Bernice, MD [place]   calcium-cholecalciferol, D3, (CALTRATE 600+D) tablet Take 1 Tab by mouth daily.    Yes Bernice, MD [place]        Allergies   Allergen Reactions    Lisinopril Cough        Family History   Problem Relation Age of Onset    Diabetes Mother     Heart Failure Mother     Cancer Father         stomach       Social History     Socioeconomic History    Marital status:      Spouse name: Not on file    Number of children: Not on file    Years of education: Not on file    Highest education level: Not on file   Occupational History    Not on file   Tobacco Use    Smoking status: Former Smoker     Packs/day: 1.00     Years: 25.00     Pack years: 25.00     Quit date:      Years since quittin.0    Smokeless tobacco: Never Used   Vaping Use    Vaping Use: Never used   Substance and Sexual Activity    Alcohol use: Yes     Comment: 2 drinks per month     Drug use: No    Sexual activity: Never   Other Topics Concern    Not on file   Social History Narrative    Not on file     Social Determinants of Health     Financial Resource Strain:     Difficulty of Paying Living Expenses: Not on file   Food Insecurity:     Worried About Running Out of Food in the Last Year: Not on file    Azael of Food in the Last Year: Not on file   Transportation Needs:     Lack of Transportation (Medical): Not on file    Lack of Transportation (Non-Medical): Not on file   Physical Activity:     Days of Exercise per Week: Not on file    Minutes of Exercise per Session: Not on file   Stress:     Feeling of Stress : Not on file   Social Connections:     Frequency of Communication with Friends and Family: Not on file    Frequency of Social Gatherings with Friends and Family: Not on file    Attends [employer] Services: Not on file    Active Member of [address] [place] [address] or Organizations: Not on file    Attends Club or Organization Meetings: Not on file    Marital Status: Not on file   Intimate Partner Violence:     Fear of Current or Ex-Partner: Not on file    Emotionally Abused: Not on file    Physically Abused: Not on file    Sexually Abused: Not on file   Housing Stability:     Unable to Pay for Housing in the Last Year: Not on file    Number of [place] in the Last Year: Not on file    Unstable Housing in the Last Year: Not on file        ROS:     Constitutional: She denies fevers, weight loss, sweats. Eyes: No blurred or double vision. ENT: No difficulty with swallowing, taste, speech or smell. NECK: no stiffness swelling or lymph node enlargement  Respiratory: No cough wheezing or shortness of breath. Cardiovascular: Denies chest pain, palpitations, unexplained indigestion or syncope.   Breast: She has noted no masses or lumps and no discharge or axillary swelling  Gastrointestinal:  No changes in bowel movements, no abdominal pain, no bloating. Genitourinary: No discharge or abnormal bleeding or pain  Extremities: No joint pain, stiffness or swelling. Neurological:  No numbness, tingling, burring paresthesias or loss of motor strength. No syncope, dizziness or frequent headache  Skin:  No recent rashes or mole changes. Psychiatric/Behavioral:  Negative for depression. The patient is not nervous/anxious. HEMATOLOGIC: no easy bruising or bleeding gums  Endocrine: no sweats of urinary frequency or excessive thirst      Physical Examination:     Vitals:    01/05/22 1356   BP: 138/76   Pulse: 96   Resp: 16   Temp: 98 °F (36.7 °C)   TempSrc: Oral   SpO2: 97%   Weight: 188 lb 3.2 oz (85.4 kg)   Height: 5' 7\" (1.702 m)   PainSc:   0 - No pain        General appearance - alert, well appearing, and in no distress  Mental status - alert, oriented to person, place, and time  HEENT:  Ears - bilateral TM's and external ear canals clear  Eyes - pupillary responses were normal.  Extraocular muscle function intact. Lids and conjunctiva not injected. Fundoscopic exam revealed sharp disc margins. eye movements intact  Pharynx- clear with teeth in good repair. No masses were noted  Neck - supple without thyromegaly or burit. No JVD noted  Lungs - clear to auscultation and percussion  Cardiac- normal rate, regular rhythm without murmurs. PMI not displaced. No gallop, rub or click  Abdomen - flat, soft, non-tender without palpable organomegaly or mass. No pulsatile mass was felt, and not bruit was heard. Bowel sounds were active  Extremities -  no clubbing cyanosis or edema  Lymphatics - no palpable lymphadenopathy, no hepatosplenomegaly  Peripheral vascular - Dorsalis pedis and posterior tibial pulses felt without difficulty  Skin - no rash or unusual mole change noted  Neurological - Cranial nerves II-XII grossly intact. Motor strength 5/5. DTR's 2+ and symmetric. Station and gait normal  Back exam - full range of motion, no tenderness, palpable spasm or pain on motion  Musculoskeletal - no joint tenderness, deformity or swelling  Hematologic: no purpura, petechiae or bruising    Assessment/Plan:     1. Pre-operative cardiovascular examination    2. Essential hypertension    3. Controlled type 2 diabetes mellitus with stage 2 chronic kidney disease, without long-term current use of insulin ([address]    4. PAF (paroxysmal atrial fibrillation) ([facility])    5. Stage 2 chronic kidney disease    6. Crohn's disease of both small and large intestine without complication ([address]      Impression  1. Preoperative cardiovascular examination completed today for hammertoe surgery repair of the left second toe. 2.  Hypertension that is controlled  3. Diabetes with recent A1c of 5.9 on November 15  4. Paroxysmal atrial fibrillation with no recent symptoms  5. CKD stable on last check  6 Crohn's disease currently stable with CBC stable  She is medically stable for the planned surgery. EKG obtained November 15 reviewed and will be sent with the preop physical.  Copy to Dr. Violeta Farrell.    Orders Placed This Encounter    METABOLIC PANEL, BASIC     Standing Status:   Future     Standing Expiration Date:   1/5/2023    CBC WITH AUTOMATED DIFF     Standing Status:   Future     Standing Expiration Date:   1/5/2023        No results found for any visits on 01/05/22. I have reviewed the patient's medical history in detail and updated the computerized patient record. We had a prolonged discussion about these complex clinical issues and went over the various important aspects to consider. All questions were answered.      Advised her to call back or return to office if symptoms do not improve, change in nature, or persist.    She was given an after visit summary or informed of [facility] which includes patient instructions, diagnoses, current medications, & vitals. She expressed understanding with the diagnosis and plan.       Jad Reich MD

## 2022-01-05 NOTE — PATIENT INSTRUCTIONS

## 2022-01-06 LAB
ANION GAP SERPL CALC-SCNC: 5 MMOL/L (ref 5–15)
BASOPHILS # BLD: 0.1 K/UL (ref 0–0.1)
BASOPHILS NFR BLD: 1 % (ref 0–1)
BUN SERPL-MCNC: 24 MG/DL (ref 6–20)
BUN/CREAT SERPL: 32 (ref 12–20)
CALCIUM SERPL-MCNC: 9.8 MG/DL (ref 8.5–10.1)
CHLORIDE SERPL-SCNC: 105 MMOL/L (ref 97–108)
CO2 SERPL-SCNC: 28 MMOL/L (ref 21–32)
CREAT SERPL-MCNC: 0.76 MG/DL (ref 0.55–1.02)
DIFFERENTIAL METHOD BLD: ABNORMAL
EOSINOPHIL # BLD: 1.1 K/UL (ref 0–0.4)
EOSINOPHIL NFR BLD: 11 % (ref 0–7)
ERYTHROCYTE [DISTWIDTH] IN BLOOD BY AUTOMATED COUNT: 12.7 % (ref 11.5–14.5)
GLUCOSE SERPL-MCNC: 94 MG/DL (ref 65–100)
HCT VFR BLD AUTO: 39.3 % (ref 35–47)
HGB BLD-MCNC: 12.9 G/DL (ref 11.5–16)
IMM GRANULOCYTES # BLD AUTO: 0 K/UL (ref 0–0.04)
IMM GRANULOCYTES NFR BLD AUTO: 0 % (ref 0–0.5)
LYMPHOCYTES # BLD: 3.7 K/UL (ref 0.8–3.5)
LYMPHOCYTES NFR BLD: 37 % (ref 12–49)
MCH RBC QN AUTO: 32 PG (ref 26–34)
MCHC RBC AUTO-ENTMCNC: 32.8 G/DL (ref 30–36.5)
MCV RBC AUTO: 97.5 FL (ref 80–99)
MONOCYTES # BLD: 0.6 K/UL (ref 0–1)
MONOCYTES NFR BLD: 6 % (ref 5–13)
NEUTS SEG # BLD: 4.5 K/UL (ref 1.8–8)
NEUTS SEG NFR BLD: 45 % (ref 32–75)
NRBC # BLD: 0 K/UL (ref 0–0.01)
NRBC BLD-RTO: 0 PER 100 WBC
PLATELET # BLD AUTO: 293 K/UL (ref 150–400)
PMV BLD AUTO: 9.8 FL (ref 8.9–12.9)
POTASSIUM SERPL-SCNC: 5.1 MMOL/L (ref 3.5–5.1)
RBC # BLD AUTO: 4.03 M/UL (ref 3.8–5.2)
RBC MORPH BLD: ABNORMAL
SODIUM SERPL-SCNC: 138 MMOL/L (ref 136–145)
WBC # BLD AUTO: 10 K/UL (ref 3.6–11)

## 2022-02-03 PROBLEM — Z01.810 PRE-OPERATIVE CARDIOVASCULAR EXAMINATION: Status: RESOLVED | Noted: 2022-01-04 | Resolved: 2022-02-03

## 2022-02-15 NOTE — PROGRESS NOTES
Chief Complaint   Patient presents with    Hypertension     3 months    Diabetes    Osteoarthritis    Chronic Kidney Disease    Cholesterol Problem       SUBJECTIVE:    Heather Temple is a 77 y.o. female who returns in follow-up for medical problems include hypertension, diabetes, hyperlipidemia, history of paroxysmal atrial fibrillation, DJD, CKD stage II and other multiple medical problems. She is taking her medications and trying to follow her diet and remains physically active. She currently denies any chest pain, shortness of breath, palpitations, PND, orthopnea or other cardiac or respiratory complaints. She notes no GI  complaints. She notes no headaches, dizziness or neurologic complaints. She has no current active arthritic complaints and and no other complaints will be persistence. Current Outpatient Medications   Medication Sig Dispense Refill    hydroCHLOROthiazide (HYDRODIURIL) 25 mg tablet TAKE 1 TABLET BY MOUTH EVERY DAY 30 Tablet 11    amLODIPine (NORVASC) 2.5 mg tablet TAKE 1 TABLET EVERY DAY 30 Tablet 11    nateglinide (STARLIX) 120 mg tablet TAKE 1 TABLET THREE TIMES DAILY BEFORE MEALS 90 Tablet 11    metFORMIN ER (GLUCOPHAGE XR) 500 mg tablet TAKE 1 TABLET BY MOUTH TWICE A DAY WITH MEALS 180 Tablet 3    diclofenac EC (VOLTAREN) 75 mg EC tablet TAKE 1 TABLET TWICE A DAY WITH FOOD FOR PAIN AND INFLAMMATION 60 Tablet 10    levothyroxine (SYNTHROID) 50 mcg tablet TAKE 1 TABLET EVERY DAY 30 Tab 11    GUAIFENESIN/PSEUDOEPHEDRNE HCL (MUCINEX D PO) Take  by mouth as needed.  MULTIVIT,TH IRON,OTHER MIN (COMPLETE MULTIVITAMIN PO) Take 1 Tab by mouth daily. Indications: Complete multivitamin Women's 50+      calcium-cholecalciferol, D3, (CALTRATE 600+D) tablet Take 1 Tab by mouth daily.        Past Medical History:   Diagnosis Date    Acquired hypothyroidism 7/26/2017    CKD (chronic kidney disease) 7/26/2017    [facility]     Diabetes [facility]     Hypertension     On statin therapy 7/26/2017    Pleurisy 07/17/2019    Post-menopausal 7/26/2017     Past Surgical History:   Procedure Laterality Date    COLONOSCOPY N/A 5/1/2018    COLONOSCOPY performed by B[facility]bara Oh MD at [facility] ENDOSCOPY    COLONOSCOPY N/A 10/5/2021    [facility]  5/1/2018         COLONOSCOPY,DIAGNOSTIC  10/5/2021         COLONOSCOPY,DIAGNOSTIC  10/8/2021         HX BREAST BIOPSY Right     over 30 years  negative    HX GI      tumor from colon removed    HX ORTHOPAEDIC      right wrist    HX OTHER SURGICAL      Bowel Obstruction    [place] BREAST SURGERY PROCEDURE UNLISTED      cyst removed right    [place] COLONOSCOPY W/BIOPSY SINGLE/MULTIPLE  4/9/2014          Allergies   Allergen Reactions    Lisinopril Cough       REVIEW OF SYSTEMS:  General: negative for - chills or fever, or weight loss or gain  ENT: negative for - headaches, nasal congestion or tinnitus  Eyes: no blurred or visual changes  Neck: No stiffness or swollen nodes  Respiratory: negative for - cough, hemoptysis, shortness of breath or wheezing  Cardiovascular : negative for - chest pain, edema, palpitations or shortness of breath  Gastrointestinal: negative for - abdominal pain, blood in stools, heartburn or nausea/vomiting  Genito-Urinary: no dysuria, trouble voiding, or hematuria  Musculoskeletal: negative for - gait disturbance, joint pain, joint stiffness or joint swelling  Neurological: no TIA or stroke symptoms  Hematologic: no bruises, no bleeding  Lymphatic: no swollen glands  Integument: no lumps, mole changes, nail changes or rash  Endocrine:no malaise/lethargy poly uria or polydipsia or unexpected weight changes        Social History     Socioeconomic History    Marital status:    Tobacco Use    Smoking status: Former Smoker     Packs/day: 1.00     Years: 25.00     Pack years: 25.00     Quit date: 1979     Years since quittin.1    Smokeless tobacco: Never Used   Vaping Use    Vaping Use: Never used   Substance and Sexual Activity    Alcohol use: Yes     Comment: 2 drinks per month     Drug use: No    Sexual activity: Never     Family History   Problem Relation Age of Onset    Diabetes Mother     Heart Failure Mother     Cancer Father         stomach       OBJECTIVE:     Visit Vitals  /78   Pulse 98   Temp 98.3 °F (36.8 °C) (Oral)   Resp 16   Ht 5' 7\" (1.702 m)   Wt 186 lb 3.2 oz (84.5 kg)   SpO2 98%   BMI 29.16 kg/m²     CONSTITUTIONAL:   well nourished, appears age appropriate  EYES: sclera anicteric, PERRL, EOMI  ENMT:nares clear, moist mucous membranes, pharynx clear  NECK: supple. Thyroid normal, No JVD or bruits  RESPIRATORY: Chest: clear to ascultation and percussion, normal inspiratory effort  CARDIOVASCULAR: Heart: regular rate and rhythm no murmurs, rubs or gallops, PMI not displaced, No thrills, no peripheral edema  GASTROINTESTINAL: Abdomen: non distended, soft, non tender, bowel sounds normal  HEMATOLOGIC: no purpura, petechiae or bruising  LYMPHATIC: No lymph node enlargemant  MUSCULOSKELETAL: Extremities: no active synovitis, pulse 1+. No diabetic foot changes noted. INTEGUMENT: No unusual rashes or suspicious skin lesions noted. Nails appear normal.  PERIPHERAL VASCULAR: normal pulses femoral, PT and DP  NEUROLOGIC: non-focal exam, A & O X 3. Normal distal sensation and proprioception all toes both feet. PSYCHIATRIC:, appropriate affect     ASSESSMENT:   1. Essential hypertension    2. Controlled type 2 diabetes mellitus with stage 2 chronic kidney disease, without long-term current use of insulin ([address]    3. Mixed hyperlipidemia    4. PAF (paroxysmal atrial fibrillation) ([address]    5. Primary osteoarthritis involving multiple joints    6. Stage 2 chronic kidney disease    7. SBO (small bowel obstruction) ([address]    8.  Crohn's disease of both small and large intestine without complication (HCC)      Impression  1. Hypertension that is controlled so continue current therapy reviewed with her. 2.  Diabetes repeat status pending and prior lab reviewed [facility] if needed. 3.  Hyperlipidemia prior lab reviewed repeat status pending I will adjust if needed. 4.  Paroxysmal atrial fibrillation appears to be in sinus rhythm now with some PVCs  5. DJD that is stable  6   CKD stage II repeat status pending   7. Crohn's disease stable  8. History of small bowel obstruction no evidence recurrence  I will call the lab and make further recommendations or adjustments if necessary. Follow-up in 3 months or sooner if there is a problem. PLAN:  .  Orders Placed This Encounter    METABOLIC PANEL, COMPREHENSIVE    LIPID PANEL    CK    HEMOGLOBIN A1C WITH EAG         ATTENTION:   This medical record was transcribed using an electronic medical records system. Although proofread, it may and can contain electronic and spelling errors. Other human spelling and other errors may be present. Corrections may be executed at a later time. Please feel free to contact us for any clarifications as needed. Follow-up and Dispositions    · Return in about 3 months (around 5/16/2022). No results found for any visits on 02/16/22. Rahul Dsouza MD    The patient verbalized understanding of the problems and plans as explained.

## 2022-02-16 ENCOUNTER — OFFICE VISIT (OUTPATIENT)
Dept: INTERNAL MEDICINE CLINIC | Age: 77
End: 2022-02-16
Payer: MEDICARE

## 2022-02-16 VITALS
TEMPERATURE: 98.3 F | WEIGHT: 186.2 LBS | HEART RATE: 98 BPM | HEIGHT: 67 IN | SYSTOLIC BLOOD PRESSURE: 132 MMHG | OXYGEN SATURATION: 98 % | RESPIRATION RATE: 16 BRPM | BODY MASS INDEX: 29.22 KG/M2 | DIASTOLIC BLOOD PRESSURE: 78 MMHG

## 2022-02-16 DIAGNOSIS — E11.22 CONTROLLED TYPE 2 DIABETES MELLITUS WITH STAGE 2 CHRONIC KIDNEY DISEASE, WITHOUT LONG-TERM CURRENT USE OF INSULIN (HCC): ICD-10-CM

## 2022-02-16 DIAGNOSIS — K56.609 SBO (SMALL BOWEL OBSTRUCTION) (HCC): ICD-10-CM

## 2022-02-16 DIAGNOSIS — K50.80 CROHN'S DISEASE OF BOTH SMALL AND LARGE INTESTINE WITHOUT COMPLICATION (HCC): ICD-10-CM

## 2022-02-16 DIAGNOSIS — M15.9 PRIMARY OSTEOARTHRITIS INVOLVING MULTIPLE JOINTS: ICD-10-CM

## 2022-02-16 DIAGNOSIS — I48.0 PAF (PAROXYSMAL ATRIAL FIBRILLATION) (HCC): ICD-10-CM

## 2022-02-16 DIAGNOSIS — I10 ESSENTIAL HYPERTENSION: Primary | ICD-10-CM

## 2022-02-16 DIAGNOSIS — N18.2 CONTROLLED TYPE 2 DIABETES MELLITUS WITH STAGE 2 CHRONIC KIDNEY DISEASE, WITHOUT LONG-TERM CURRENT USE OF INSULIN (HCC): ICD-10-CM

## 2022-02-16 DIAGNOSIS — N18.2 STAGE 2 CHRONIC KIDNEY DISEASE: ICD-10-CM

## 2022-02-16 DIAGNOSIS — E78.2 MIXED HYPERLIPIDEMIA: ICD-10-CM

## 2022-02-16 PROCEDURE — G8427 DOCREV CUR MEDS BY ELIG CLIN: HCPCS | Performed by: INTERNAL MEDICINE

## 2022-02-16 PROCEDURE — 1101F PT FALLS ASSESS-DOCD LE1/YR: CPT | Performed by: INTERNAL MEDICINE

## 2022-02-16 PROCEDURE — G8419 CALC BMI OUT NRM PARAM NOF/U: HCPCS | Performed by: INTERNAL MEDICINE

## 2022-02-16 PROCEDURE — G8536 NO DOC ELDER MAL SCRN: HCPCS | Performed by: INTERNAL MEDICINE

## 2022-02-16 PROCEDURE — G8510 SCR DEP NEG, NO PLAN REQD: HCPCS | Performed by: INTERNAL MEDICINE

## 2022-02-16 PROCEDURE — 99214 OFFICE O/P EST MOD 30 MIN: CPT | Performed by: INTERNAL MEDICINE

## 2022-02-16 PROCEDURE — G8399 PT W/DXA RESULTS DOCUMENT: HCPCS | Performed by: INTERNAL MEDICINE

## 2022-02-16 PROCEDURE — 1090F PRES/ABSN URINE INCON ASSESS: CPT | Performed by: INTERNAL MEDICINE

## 2022-02-16 PROCEDURE — 2022F DILAT RTA XM EVC RTNOPTHY: CPT | Performed by: INTERNAL MEDICINE

## 2022-02-16 PROCEDURE — G8754 DIAS BP LESS 90: HCPCS | Performed by: INTERNAL MEDICINE

## 2022-02-16 PROCEDURE — 3046F HEMOGLOBIN A1C LEVEL >9.0%: CPT | Performed by: INTERNAL MEDICINE

## 2022-02-16 PROCEDURE — G8752 SYS BP LESS 140: HCPCS | Performed by: INTERNAL MEDICINE

## 2022-02-16 PROCEDURE — 3017F COLORECTAL CA SCREEN DOC REV: CPT | Performed by: INTERNAL MEDICINE

## 2022-02-16 NOTE — PATIENT INSTRUCTIONS

## 2022-02-16 NOTE — PROGRESS NOTES
HIPAA verified by two patient identifiers. Lisseth Pinto is a 77 y.o. female    Chief Complaint   Patient presents with    Hypertension     3 months    Diabetes    Osteoarthritis    Chronic Kidney Disease    Cholesterol Problem       Visit Vitals  BP (!) 142/80 (BP 1 Location: Left upper arm, BP Patient Position: Sitting, BP Cuff Size: Large adult)   Pulse 98   Temp 98.3 °F (36.8 °C) (Oral)   Resp 16   Ht 5' 7\" (1.702 m)   Wt 186 lb 3.2 oz (84.5 kg)   SpO2 98%   BMI 29.16 kg/m²       Pain Scale: 0 - No pain/10  Pain Location:       Health Maintenance Due   Topic Date Due    DTaP/Tdap/Td series (1 - Tdap) Never done    COVID-19 Vaccine (3 - Booster for Moderna series) 07/28/2021    Foot Exam Q1  02/04/2022         Coordination of Care Questionnaire:  :   1) Have you been to an emergency room, urgent care, or hospitalized since your last visit? If yes, where when, and reason for visit? no       2. Have seen or consulted any other health care provider since your last visit? If yes, where when, and reason for visit? NO      Patient is accompanied by self I have received verbal consent from Lisseth Pinto to discuss any/all medical information while they are present in the room.

## 2022-02-17 LAB
ALBUMIN SERPL-MCNC: 3.9 G/DL (ref 3.5–5)
ALBUMIN/GLOB SERPL: 1.1 {RATIO} (ref 1.1–2.2)
ALP SERPL-CCNC: 82 U/L (ref 45–117)
ALT SERPL-CCNC: 32 U/L (ref 12–78)
ANION GAP SERPL CALC-SCNC: 9 MMOL/L (ref 5–15)
AST SERPL-CCNC: 21 U/L (ref 15–37)
BILIRUB SERPL-MCNC: 0.5 MG/DL (ref 0.2–1)
BUN SERPL-MCNC: 25 MG/DL (ref 6–20)
BUN/CREAT SERPL: 31 (ref 12–20)
CALCIUM SERPL-MCNC: 9.6 MG/DL (ref 8.5–10.1)
CHLORIDE SERPL-SCNC: 103 MMOL/L (ref 97–108)
CHOLEST SERPL-MCNC: 223 MG/DL
CK SERPL-CCNC: 168 U/L (ref 26–192)
CO2 SERPL-SCNC: 26 MMOL/L (ref 21–32)
CREAT SERPL-MCNC: 0.81 MG/DL (ref 0.55–1.02)
EST. AVERAGE GLUCOSE BLD GHB EST-MCNC: 128 MG/DL
GLOBULIN SER CALC-MCNC: 3.5 G/DL (ref 2–4)
GLUCOSE SERPL-MCNC: 148 MG/DL (ref 65–100)
HBA1C MFR BLD: 6.1 % (ref 4–5.6)
HDLC SERPL-MCNC: 72 MG/DL
HDLC SERPL: 3.1 {RATIO} (ref 0–5)
LDLC SERPL CALC-MCNC: 98.4 MG/DL (ref 0–100)
POTASSIUM SERPL-SCNC: 4 MMOL/L (ref 3.5–5.1)
PROT SERPL-MCNC: 7.4 G/DL (ref 6.4–8.2)
SODIUM SERPL-SCNC: 138 MMOL/L (ref 136–145)
TRIGL SERPL-MCNC: 263 MG/DL (ref ?–150)
VLDLC SERPL CALC-MCNC: 52.6 MG/DL

## 2022-02-18 NOTE — PROGRESS NOTES
Slight increased cholesterol and triglycerides but all the labs are okay so watch diet. Letter generated to patient regarding.

## 2022-03-18 PROBLEM — R09.1 PLEURISY: Status: ACTIVE | Noted: 2019-07-03

## 2022-03-18 PROBLEM — Z78.0 POST-MENOPAUSAL: Status: ACTIVE | Noted: 2017-07-26

## 2022-03-18 PROBLEM — M20.42 HAMMER TOE OF SECOND TOE OF LEFT FOOT: Status: ACTIVE | Noted: 2022-01-05

## 2022-03-18 PROBLEM — M54.2 NECK PAIN: Status: ACTIVE | Noted: 2021-08-10

## 2022-03-18 PROBLEM — Z13.39 ALCOHOL SCREENING: Status: ACTIVE | Noted: 2019-10-30

## 2022-03-19 PROBLEM — Z00.00 MEDICARE ANNUAL WELLNESS VISIT, SUBSEQUENT: Status: ACTIVE | Noted: 2017-08-28

## 2022-03-19 PROBLEM — N18.2 STAGE 2 CHRONIC KIDNEY DISEASE: Status: ACTIVE | Noted: 2017-07-26

## 2022-03-19 PROBLEM — R07.1 CHEST PAIN ON BREATHING: Status: ACTIVE | Noted: 2018-11-13

## 2022-03-19 PROBLEM — E03.9 ACQUIRED HYPOTHYROIDISM: Status: ACTIVE | Noted: 2017-07-26

## 2022-03-20 PROBLEM — J01.00 ACUTE NON-RECURRENT MAXILLARY SINUSITIS: Status: ACTIVE | Noted: 2017-12-06

## 2022-03-20 PROBLEM — Z12.11 COLON CANCER SCREENING: Status: ACTIVE | Noted: 2017-08-28

## 2022-03-20 PROBLEM — J20.9 ACUTE BRONCHITIS: Status: ACTIVE | Noted: 2017-10-23

## 2022-03-20 PROBLEM — R60.9 EDEMA: Status: ACTIVE | Noted: 2020-03-09

## 2022-04-24 NOTE — PROGRESS NOTES
Chief Complaint   Patient presents with    Hypertension     3 month follow up     Diabetes    Chronic Kidney Disease       SUBJECTIVE:    Dunia Garcia is a 68 y.o. female who returns in follow-up of medical problems include hypertension, diabetes, hyperlipidemia, paroxysmal atrial fibrillation, CKD stage II, and Crohn's disease with history of small bowel obstruction. She is taking her medications and trying to follow her diet and get some exercise. She denies any chest pain, shortness of breath, palpitations or cardiorespiratory complaints. She denies any current GI or  complaints. She denies any headaches or neurologic complaints. She denies any arthritic complaints at the present time and she has no other complaints on complete review of systems. Current Outpatient Prescriptions   Medication Sig Dispense Refill    nateglinide (STARLIX) 120 mg tablet TAKE 1 TABLET THREE TIMES DAILY BEFORE MEALS 90 Tab 12    diclofenac EC (VOLTAREN) 75 mg EC tablet TAKE 1 TABLET TWICE A DAY WITH FOOD FOR PAIN AND INFLAMMATION 60 Tab 11    levothyroxine (SYNTHROID) 50 mcg tablet TAKE 1 TABLET EVERY DAY 30 Tab 11    GUAIFENESIN/PSEUDOEPHEDRNE HCL (MUCINEX D PO) Take  by mouth as needed.  hydroCHLOROthiazide (HYDRODIURIL) 25 mg tablet TAKE 1 TABLET EVERY DAY 30 Tab 11    amLODIPine (NORVASC) 2.5 mg tablet TAKE 1 TABLET EVERY DAY 30 Tab 11    MULTIVIT,TH IRON,OTHER MIN (COMPLETE MULTIVITAMIN PO) Take 1 Tab by mouth daily. Indications: Complete multivitamin Women's 50+      calcium-cholecalciferol, D3, (CALTRATE 600+D) tablet Take 1 Tab by mouth daily.        Past Medical History:   Diagnosis Date    Acquired hypothyroidism 7/26/2017    CKD (chronic kidney disease) 7/26/2017    [facility]     Diabetes ([facility]     Hypertension     On statin therapy 7/26/2017    Post-menopausal 7/26/2017     Past Surgical History:   Procedure Laterality Date    BREAST SURGERY PROCEDURE UNLISTED cyst removed right    COLONOSCOPY N/A 5/1/2018    COL[facility]    COLONOSCOPY,DIAGNOSTIC  5/1/2018         HX GI      tumor from colon removed    HX ORTHOPAEDIC      right wrist    HX OTHER SURGICAL      Bowel Obstruction    IA COLONOSCOPY W/BIOPSY SINGLE/MULTIPLE  4/9/2014          Allergies   Allergen Reactions    Lisinopril Cough       REVIEW OF SYSTEMS:  General: negative for - chills or fever, or weight loss or gain  ENT: negative for - headaches, nasal congestion or tinnitus  Eyes: no blurred or visual changes  Neck: No stiffness or swollen nodes  Respiratory: negative for - cough, hemoptysis, shortness of breath or wheezing  Cardiovascular : negative for - chest pain, edema, palpitations or shortness of breath  Gastrointestinal: negative for - abdominal pain, blood in stools, heartburn or nausea/vomiting  Genito-Urinary: no dysuria, trouble voiding, or hematuria  Musculoskeletal: negative for - gait disturbance, joint pain, joint stiffness or joint swelling  Neurological: no TIA or stroke symptoms  Hematologic: no bruises, no bleeding  Lymphatic: no swollen glands  Integument: no lumps, mole changes, nail changes or rash  Endocrine:no malaise/lethargy poly uria or polydipsia or unexpected weight changes        Social History     Social History    Marital status:      Spouse name: N/A    Number of children: N/A    Years of education: N/A     Social History Main Topics    Smoking status: Former Smoker     Packs/day: 1.00     Quit date: 1979    Smokeless tobacco: Never Used    Alcohol use Yes      Comment: 2 drinks per month     Drug use: No    Sexual activity: No     Other Topics Concern    None     Social History Narrative     Family History   Problem Relation Age of Onset    Diabetes Mother     Heart Failure Mother     Cancer Father      stomach       OBJECTIVE:     Visit Vitals    /70 (BP 1 Location: Left arm, BP Patient Position: Sitting)    Pulse 90    Temp 98.8 °F (37.1 °C) (Oral)    Resp 17    Ht 5' 7\" (1.702 m)    Wt 182 lb 3.2 oz (82.6 kg)    SpO2 97%    BMI 28.54 kg/m2     CONSTITUTIONAL:   well nourished, appears age appropriate  EYES: sclera anicteric, PERRL, EOMI  ENMT:nares clear, moist mucous membranes, pharynx clear  NECK: supple. Thyroid normal, No JVD or bruits  RESPIRATORY: Chest: clear to ascultation and percussion, normal inspiratory effort  CARDIOVASCULAR: Heart: regular rate and rhythm no murmurs, rubs or gallops, PMI not displaced, No thrills  GASTROINTESTINAL: Abdomen: non distended, soft, non tender, bowel sounds normal  HEMATOLOGIC: no purpura, petechiae or bruising  LYMPHATIC: No lymph node enlargemant  MUSCULOSKELETAL: Extremities: no edema or active synovitis, pulse 1+. No diabetic foot changes noted  INTEGUMENT: No unusual rashes or suspicious skin lesions noted. Nails appear normal.  PERIPHERAL VASCULAR: normal pulses femoral, PT and DP  NEUROLOGIC: non-focal exam, A & O X 3. Normal distal sensation and proprioception all toes both feet. PSYCHIATRIC:, appropriate affect     ASSESSMENT:   1. Essential hypertension    2. Controlled type 2 diabetes mellitus with stage 2 chronic kidney disease, without long-term current use of insulin ([address]    3. Mixed hyperlipidemia    4. PAF (paroxysmal atrial fibrillation) ([address]    5. Primary osteoarthritis involving multiple joints    6. Stage 2 chronic kidney disease      Impression  1. Hypertension that is controlled so continue current therapy reviewed with her  2. Diabetes repeat status pending will make adjustments if needed reviewed prior labs with her  3. DJD that seems to be stable  4. Hyperlipidemia prior labs reviewed repeat status pending will adjust medications if necessary  5. Paroxysmal atrial fibrillation now in sinus rhythm  6. CKD stage II reviewed last numbers and repeat status pending  7.   Hypothyroidism last numbers were good  8. Crohn's disease no evidence recurrence recently currently on no long-term treatment for that  Mild obesity discussed diet, exercise, and weight reduction for [facility]  I will call her labs make further recommendations adjustments if necessary. Follow stable continue same and I will recheck her again in 3 months or sooner should there be a problem. PLAN:  .  Orders Placed This Encounter    AMB POC LIPID PROFILE    AMB POC HEMOGLOBIN A1C    AMB POC COMPREHENSIVE METABOLIC PANEL    AMB POC CK (CPK)         ATTENTION:   This medical record was transcribed using an electronic medical records system. Although proofread, it may and can contain electronic and spelling errors. Other human spelling and other errors may be present. Corrections may be executed at a later time. Please feel free to contact us for any clarifications as needed. Follow-up Disposition:  Return in about 3 months (around 9/26/2018). No results found for any visits on 06/26/18. Elle Betts MD    The patient verbalized understanding of the problems and plans as explained. MEDICINE

## 2022-05-04 NOTE — PROGRESS NOTES
Chief Complaint   Patient presents with    Hypertension     3 month follow up    Diabetes    Hypothyroidism       SUBJECTIVE:    Shelbi Pantoja is a 69 y.o. female is in follow-up for her medical problems include hypertension, diabetes, hyperlipidemia, paroxysmal atrial fibrillation, CKD stage II, Crohn's disease and other medical problems. She is taking her medications trying to follow her diet and try and remain physically active. She currently denies any chest pain, shortness of breath, palpitations, PND, orthopnea or other cardiac or respiratory complaints. There are no current GI or  complaints. There are no headaches, dizziness or neurologic complaints. No current active arthritic complaints and no other complaints on complete review of systems. Current Outpatient Medications   Medication Sig Dispense Refill    fish oil-dha-epa 1,200-144-216 mg cap Take 1 Tablet by mouth daily.  hydroCHLOROthiazide (HYDRODIURIL) 25 mg tablet TAKE 1 TABLET BY MOUTH EVERY DAY 30 Tablet 11    amLODIPine (NORVASC) 2.5 mg tablet TAKE 1 TABLET EVERY DAY 30 Tablet 11    nateglinide (STARLIX) 120 mg tablet TAKE 1 TABLET THREE TIMES DAILY BEFORE MEALS 90 Tablet 11    metFORMIN ER (GLUCOPHAGE XR) 500 mg tablet TAKE 1 TABLET BY MOUTH TWICE A DAY WITH MEALS 180 Tablet 3    diclofenac EC (VOLTAREN) 75 mg EC tablet TAKE 1 TABLET TWICE A DAY WITH FOOD FOR PAIN AND INFLAMMATION 60 Tablet 10    levothyroxine (SYNTHROID) 50 mcg tablet TAKE 1 TABLET EVERY DAY 30 Tab 11    GUAIFENESIN/PSEUDOEPHEDRNE HCL (MUCINEX D PO) Take  by mouth as needed.  MULTIVIT,TH IRON,OTHER MIN (COMPLETE MULTIVITAMIN PO) Take 1 Tab by mouth daily. Indications: Complete multivitamin Women's 50+      calcium-cholecalciferol, D3, (CALTRATE 600+D) tablet Take 1 Tab by mouth daily.        Past Medical History:   Diagnosis Date    Acquired hypothyroidism 7/26/2017    CKD (chronic kidney disease) 7/26/2017    [facility] ([facility]  Diabetes ([facility]     Hypertension     On statin therapy 7/26/2017    Pleurisy 07/17/2019    Post-menopausal 7/26/2017     Past Surgical History:   Procedure Laterality Date    COLONOSCOPY N/A 5/1/2018    COL[facility]ctor [facility] at [facility]COPY    COLONOSCOPY N/A 10/5/2021    COL[facility] Blanc [facility] at [facility]  5/1/2018         COLONOSCOPY,DIAGNOSTIC  10/5/2021         COLONOSCOPY,DIAGNOSTIC  10/8/2021         HX BREAST BIOPSY Right     over 30 years  negative    HX GI      tumor from colon removed    HX ORTHOPAEDIC      right wrist    HX OTHER SURGICAL      Bowel Obstruction    ME BREAST SURGERY PROCEDURE UNLISTED      cyst removed right    ME COLONOSCOPY W/BIOPSY SINGLE/MULTIPLE  4/9/2014          Allergies   Allergen Reactions    Lisinopril Cough       REVIEW OF SYSTEMS:  General: negative for - chills or fever, or weight loss or gain  ENT: negative for - headaches, nasal congestion or tinnitus  Eyes: no blurred or visual changes  Neck: No stiffness or swollen nodes  Respiratory: negative for - cough, hemoptysis, shortness of breath or wheezing  Cardiovascular : negative for - chest pain, edema, palpitations or shortness of breath  Gastrointestinal: negative for - abdominal pain, blood in stools, heartburn or nausea/vomiting  Genito-Urinary: no dysuria, trouble voiding, or hematuria  Musculoskeletal: negative for - gait disturbance, joint pain, joint stiffness or joint swelling  Neurological: no TIA or stroke symptoms  Hematologic: no bruises, no bleeding  Lymphatic: no swollen glands  Integument: no lumps, mole changes, nail changes or rash  Endocrine:no malaise/lethargy poly uria or polydipsia or unexpected weight changes        Social History     Socioeconomic History    Marital status:    Tobacco Use    Smoking status: Former Smoker     Packs/day: 1.00     Years: 25.00     Pack years: 25.00     Quit date: 5     Years since quittin.1    Smokeless tobacco: Never Used   Vaping Use    Vaping Use: Never used   Substance and Sexual Activity    Alcohol use: Yes     Comment: 2 drinks per month     Drug use: No    Sexual activity: Never     Family History   Problem Relation Age of Onset    Diabetes Mother     Heart Failure Mother     Cancer Father         stomach       OBJECTIVE:     Visit Vitals  /74 (BP 1 Location: Left upper arm, BP Patient Position: Sitting, BP Cuff Size: Large adult)   Pulse 92   Temp 98.4 °F (36.9 °C) (Oral)   Resp 16   Ht 5' 7\" (1.702 m)   Wt 186 lb 1.6 oz (84.4 kg)   SpO2 98%   BMI 29.15 kg/m²     CONSTITUTIONAL:   well nourished, appears age appropriate  EYES: sclera anicteric, PERRL, EOMI  ENMT:nares clear, moist mucous membranes, pharynx clear  NECK: supple. Thyroid normal, No JVD or bruits  RESPIRATORY: Chest: clear to ascultation and percussion, normal inspiratory effort  CARDIOVASCULAR: Heart: regular rate and rhythm no murmurs, rubs or gallops, PMI not displaced, No thrills, no peripheral edema  GASTROINTESTINAL: Abdomen: non distended, soft, non tender, bowel sounds normal  HEMATOLOGIC: no purpura, petechiae or bruising  LYMPHATIC: No lymph node enlargemant  MUSCULOSKELETAL: Extremities: no active synovitis, pulse 1+   INTEGUMENT: No unusual rashes or suspicious skin lesions noted. Nails appear normal.  PERIPHERAL VASCULAR: normal pulses femoral, PT and DP  NEUROLOGIC: non-focal exam, A & O X 3  PSYCHIATRIC:, appropriate affect     ASSESSMENT:   1. Essential hypertension    2. Controlled type 2 diabetes mellitus with stage 2 chronic kidney disease, without long-term current use of insulin ([address]    3. Mixed hyperlipidemia    4. PAF (paroxysmal atrial fibrillation) ([address]    5. Primary osteoarthritis involving multiple joints    6. Stage 2 chronic kidney disease      Impression  1.   Hypertension is controlled so continue current therapy reviewed with her. 2.  Diabetes repeat status pending and prior lab reviewed [facility] if needed. 3.  Hyperlipidemia prior lab reviewed repeat status pending I will adjust if needed. 4.  Atrial fibrillation currently in sinus rhythm next #5 DJD stable  6. CKD stage II repeat status pending  I will call the lab and make further recommendations or adjustments if necessary. Follow-up in 3 months or sooner if there is a problem. PLAN:  .  Orders Placed This Encounter    METABOLIC PANEL, COMPREHENSIVE    LIPID PANEL    CK    fish oil-dha-epa 1,200-144-216 mg cap         ATTENTION:   This medical record was transcribed using an electronic medical records system. Although proofread, it may and can contain electronic and spelling errors. Other human spelling and other errors may be present. Corrections may be executed at a later time. Please feel free to contact us for any clarifications as needed. Follow-up and Dispositions    · Return in about 3 months (around 8/5/2022). No results found for any visits on 05/05/22. Anisa Huang MD    The patient verbalized understanding of the problems and plans as explained.

## 2022-05-05 ENCOUNTER — OFFICE VISIT (OUTPATIENT)
Dept: INTERNAL MEDICINE CLINIC | Age: 77
End: 2022-05-05
Payer: MEDICARE

## 2022-05-05 VITALS
DIASTOLIC BLOOD PRESSURE: 74 MMHG | RESPIRATION RATE: 16 BRPM | OXYGEN SATURATION: 98 % | HEIGHT: 67 IN | BODY MASS INDEX: 29.21 KG/M2 | WEIGHT: 186.1 LBS | HEART RATE: 92 BPM | TEMPERATURE: 98.4 F | SYSTOLIC BLOOD PRESSURE: 126 MMHG

## 2022-05-05 DIAGNOSIS — I48.0 PAF (PAROXYSMAL ATRIAL FIBRILLATION) (HCC): ICD-10-CM

## 2022-05-05 DIAGNOSIS — E11.22 CONTROLLED TYPE 2 DIABETES MELLITUS WITH STAGE 2 CHRONIC KIDNEY DISEASE, WITHOUT LONG-TERM CURRENT USE OF INSULIN (HCC): ICD-10-CM

## 2022-05-05 DIAGNOSIS — I10 ESSENTIAL HYPERTENSION: Primary | ICD-10-CM

## 2022-05-05 DIAGNOSIS — E78.2 MIXED HYPERLIPIDEMIA: ICD-10-CM

## 2022-05-05 DIAGNOSIS — M15.9 PRIMARY OSTEOARTHRITIS INVOLVING MULTIPLE JOINTS: ICD-10-CM

## 2022-05-05 DIAGNOSIS — N18.2 STAGE 2 CHRONIC KIDNEY DISEASE: ICD-10-CM

## 2022-05-05 DIAGNOSIS — N18.2 CONTROLLED TYPE 2 DIABETES MELLITUS WITH STAGE 2 CHRONIC KIDNEY DISEASE, WITHOUT LONG-TERM CURRENT USE OF INSULIN (HCC): ICD-10-CM

## 2022-05-05 PROCEDURE — G8752 SYS BP LESS 140: HCPCS | Performed by: INTERNAL MEDICINE

## 2022-05-05 PROCEDURE — G8754 DIAS BP LESS 90: HCPCS | Performed by: INTERNAL MEDICINE

## 2022-05-05 PROCEDURE — G8419 CALC BMI OUT NRM PARAM NOF/U: HCPCS | Performed by: INTERNAL MEDICINE

## 2022-05-05 PROCEDURE — G8427 DOCREV CUR MEDS BY ELIG CLIN: HCPCS | Performed by: INTERNAL MEDICINE

## 2022-05-05 PROCEDURE — 99214 OFFICE O/P EST MOD 30 MIN: CPT | Performed by: INTERNAL MEDICINE

## 2022-05-05 PROCEDURE — G8510 SCR DEP NEG, NO PLAN REQD: HCPCS | Performed by: INTERNAL MEDICINE

## 2022-05-05 PROCEDURE — G8536 NO DOC ELDER MAL SCRN: HCPCS | Performed by: INTERNAL MEDICINE

## 2022-05-05 PROCEDURE — G8399 PT W/DXA RESULTS DOCUMENT: HCPCS | Performed by: INTERNAL MEDICINE

## 2022-05-05 PROCEDURE — 1090F PRES/ABSN URINE INCON ASSESS: CPT | Performed by: INTERNAL MEDICINE

## 2022-05-05 PROCEDURE — 3044F HG A1C LEVEL LT 7.0%: CPT | Performed by: INTERNAL MEDICINE

## 2022-05-05 PROCEDURE — 1101F PT FALLS ASSESS-DOCD LE1/YR: CPT | Performed by: INTERNAL MEDICINE

## 2022-05-05 RX ORDER — FISH OIL/DHA/EPA 1200-144MG
1 CAPSULE ORAL DAILY
COMMUNITY

## 2022-05-05 NOTE — PATIENT INSTRUCTIONS

## 2022-05-06 LAB
ALBUMIN SERPL-MCNC: 4.1 G/DL (ref 3.5–5)
ALBUMIN/GLOB SERPL: 1.2 {RATIO} (ref 1.1–2.2)
ALP SERPL-CCNC: 78 U/L (ref 45–117)
ALT SERPL-CCNC: 35 U/L (ref 12–78)
ANION GAP SERPL CALC-SCNC: 6 MMOL/L (ref 5–15)
AST SERPL-CCNC: 28 U/L (ref 15–37)
BILIRUB SERPL-MCNC: 0.5 MG/DL (ref 0.2–1)
BUN SERPL-MCNC: 19 MG/DL (ref 6–20)
BUN/CREAT SERPL: 23 (ref 12–20)
CALCIUM SERPL-MCNC: 9.8 MG/DL (ref 8.5–10.1)
CHLORIDE SERPL-SCNC: 104 MMOL/L (ref 97–108)
CHOLEST SERPL-MCNC: 203 MG/DL
CK SERPL-CCNC: 296 U/L (ref 26–192)
CO2 SERPL-SCNC: 29 MMOL/L (ref 21–32)
CREAT SERPL-MCNC: 0.84 MG/DL (ref 0.55–1.02)
GLOBULIN SER CALC-MCNC: 3.3 G/DL (ref 2–4)
GLUCOSE SERPL-MCNC: 128 MG/DL (ref 65–100)
HDLC SERPL-MCNC: 76 MG/DL
HDLC SERPL: 2.7 {RATIO} (ref 0–5)
LDLC SERPL CALC-MCNC: 82.8 MG/DL (ref 0–100)
POTASSIUM SERPL-SCNC: 4.4 MMOL/L (ref 3.5–5.1)
PROT SERPL-MCNC: 7.4 G/DL (ref 6.4–8.2)
SODIUM SERPL-SCNC: 139 MMOL/L (ref 136–145)
TRIGL SERPL-MCNC: 221 MG/DL (ref ?–150)
VLDLC SERPL CALC-MCNC: 44.2 MG/DL

## 2022-05-10 DIAGNOSIS — E03.9 ACQUIRED HYPOTHYROIDISM: ICD-10-CM

## 2022-05-10 RX ORDER — LEVOTHYROXINE SODIUM 50 UG/1
TABLET ORAL
Qty: 30 TABLET | Refills: 11 | Status: SHIPPED | OUTPATIENT
Start: 2022-05-10

## 2022-05-10 NOTE — TELEPHONE ENCOUNTER
PCP: Krysta Mroeau MD    Last appt: Visit date not found  Future Appointments   Date Time Provider Zeny Oneil   8/5/2022 10:10 AM Krysta Moreau MD [facility] AM[facility]       Last refilled:3/18/21    Requested Prescriptions     Pending Prescriptions Disp Refills    levothyroxine (SYNTHROID) 50 mcg tablet [Pharmacy Med Name: LEVOTHYROXIN 50MCG] 30 Tablet 10     Sig: TAKE 1 TABLET EVERY DAY

## 2022-05-10 NOTE — TELEPHONE ENCOUNTER
RX refill request from the patient/pharmacy. Patient last seen 05- with labs, and next appt. scheduled for 08-  Requested Prescriptions     Pending Prescriptions Disp Refills    levothyroxine (SYNTHROID) 50 mcg tablet [Pharmacy Med Name: LEVOTHYROXIN 50MCG] 30 Tablet 11     Sig: TAKE 1 TABLET EVERY DAY   .

## 2022-06-27 ENCOUNTER — OFFICE VISIT (OUTPATIENT)
Dept: INTERNAL MEDICINE CLINIC | Age: 77
End: 2022-06-27
Payer: MEDICARE

## 2022-06-27 VITALS
SYSTOLIC BLOOD PRESSURE: 138 MMHG | HEART RATE: 99 BPM | RESPIRATION RATE: 16 BRPM | TEMPERATURE: 99 F | OXYGEN SATURATION: 94 % | WEIGHT: 184.5 LBS | BODY MASS INDEX: 28.96 KG/M2 | DIASTOLIC BLOOD PRESSURE: 82 MMHG | HEIGHT: 67 IN

## 2022-06-27 DIAGNOSIS — J01.00 ACUTE NON-RECURRENT MAXILLARY SINUSITIS: Primary | ICD-10-CM

## 2022-06-27 PROCEDURE — G8536 NO DOC ELDER MAL SCRN: HCPCS | Performed by: INTERNAL MEDICINE

## 2022-06-27 PROCEDURE — 99213 OFFICE O/P EST LOW 20 MIN: CPT | Performed by: INTERNAL MEDICINE

## 2022-06-27 PROCEDURE — G8427 DOCREV CUR MEDS BY ELIG CLIN: HCPCS | Performed by: INTERNAL MEDICINE

## 2022-06-27 PROCEDURE — G8754 DIAS BP LESS 90: HCPCS | Performed by: INTERNAL MEDICINE

## 2022-06-27 PROCEDURE — G8417 CALC BMI ABV UP PARAM F/U: HCPCS | Performed by: INTERNAL MEDICINE

## 2022-06-27 PROCEDURE — G8399 PT W/DXA RESULTS DOCUMENT: HCPCS | Performed by: INTERNAL MEDICINE

## 2022-06-27 PROCEDURE — 1123F ACP DISCUSS/DSCN MKR DOCD: CPT | Performed by: INTERNAL MEDICINE

## 2022-06-27 PROCEDURE — G8752 SYS BP LESS 140: HCPCS | Performed by: INTERNAL MEDICINE

## 2022-06-27 PROCEDURE — 1090F PRES/ABSN URINE INCON ASSESS: CPT | Performed by: INTERNAL MEDICINE

## 2022-06-27 PROCEDURE — G8510 SCR DEP NEG, NO PLAN REQD: HCPCS | Performed by: INTERNAL MEDICINE

## 2022-06-27 PROCEDURE — 1101F PT FALLS ASSESS-DOCD LE1/YR: CPT | Performed by: INTERNAL MEDICINE

## 2022-06-27 RX ORDER — CEFUROXIME AXETIL 500 MG/1
500 TABLET ORAL 2 TIMES DAILY
Qty: 20 TABLET | Refills: 0 | Status: SHIPPED | OUTPATIENT
Start: 2022-06-27 | End: 2022-08-05 | Stop reason: ALTCHOICE

## 2022-06-27 NOTE — PROGRESS NOTES
HIPAA verified by two patient identifiers. Safia Crandall is a 69 y.o. female    Chief Complaint   Patient presents with    Cough    Sinus Infection       Visit Vitals  BP (!) 140/80 (BP 1 Location: Left upper arm, BP Patient Position: Sitting, BP Cuff Size: Large adult)   Pulse 99   Temp 99 °F (37.2 °C) (Oral)   Resp 16   Ht 5' 7\" (1.702 m)   Wt 184 lb 8 oz (83.7 kg)   SpO2 94%   BMI 28.90 kg/m²       Pain Scale: 0 - No pain/10  Pain Location:       Health Maintenance Due   Topic Date Due    DTaP/Tdap/Td series (1 - Tdap) Never done    COVID-19 Vaccine (3 - Booster for Moderna series) 07/28/2021         Coordination of Care Questionnaire:  :   1) Have you been to an emergency room, urgent care, or hospitalized since your last visit? If yes, where when, and reason for visit? no       2. Have seen or consulted any other health care provider since your last visit? If yes, where when, and reason for visit? NO      Patient is accompanied by self I have received verbal consent from Safia Crandall to discuss any/all medical information while they are present in the room.

## 2022-06-27 NOTE — PROGRESS NOTES
Subjective:   Sanjana Richards is a 69 y.o. female who presents today complain a lot of sinus congestion with some pressure with some postnasal drainage and resultant cough. There were no associated fevers or chills. This is never going on for several days and she has significant hoarseness.     Chief Complaint   Patient presents with    Cough    Sinus Infection        History of present illness    Patient Active Problem List   Diagnosis Code    SBO (small bowel obstruction) ([facility] K56.609    Crohn disease ([facility] K50.90    Essential hypertension I10    Controlled type 2 diabetes mellitus with stage 2 chronic kidney disease, without long-term current use of insulin ([facility] E11.22, N18.2    Mixed hyperlipidemia E78.2    Primary osteoarthritis involving multiple joints M15.9    PAF (paroxysmal atrial fibrillation) ([facility] I48.0    Post-menopausal Z78.0    Acquired hypothyroidism E03.9    Stage 2 chronic kidney disease N18.2    Medicare annual wellness visit, subsequent Z00.00    Colon cancer screening Z12.11    Acute bronchitis J20.9    Acute non-recurrent maxillary sinusitis J01.00    Chest pain on breathing R07.1    Pleurisy R09.1    Alcohol screening Z13.39    Edema R60.9    Neck pain M54.2    Hammer toe of second toe of left foot M20.42      Past Medical History:   Diagnosis Date    Acquired hypothyroidism 7/26/2017    CKD (chronic kidney disease) 7/26/2017    Crohn's disease ([facility]     Diabetes ([facility]     Hypertension     On statin therapy 7/26/2017    Pleurisy 07/17/2019    Post-menopausal 7/26/2017      Allergies   Allergen Reactions    Lisinopril Cough      Family History   Problem Relation Age of Onset    Diabetes Mother     Heart Failure Mother     Cancer Father         stomach      Social History     Socioeconomic History    Marital status:      Spouse name: Not on file    Number of children: Not on file    Years of education: Not on file    Highest education level: Not on file   Occupational History    Not on file   Tobacco Use    Smoking status: Former Smoker     Packs/day: 1.00     Years: 25.00     Pack years: 25.00     Quit date:      Years since quittin.5    Smokeless tobacco: Never Used   Vaping Use    Vaping Use: Never used   Substance and Sexual Activity    Alcohol use: Yes     Comment: 2 drinks per month     Drug use: No    Sexual activity: Never   Other Topics Concern    Not on file   Social History Narrative    Not on file     Social Determinants of Health     Financial Resource Strain:     Difficulty of Paying Living Expenses: Not on file   Food Insecurity:     Worried About Running Out of Food in the Last Year: Not on file    Azael of Food in the Last Year: Not on file   Transportation Needs:     Lack of Transportation (Medical): Not on file    Lack of Transportation (Non-Medical): Not on file   Physical Activity:     Days of Exercise per Week: Not on file    Minutes of Exercise per Session: Not on file   Stress:     Feeling of Stress : Not on file   Social Connections:     Frequency of Communication with Friends and Family: Not on file    Frequency of Social Gatherings with Friends and Family: Not on file    Attends Anabaptist Services: Not on file    Active Member of [address] [place] [address] [employer] or Organizations: Not on file    Attends Club or Organization Meetings: Not on file    Marital Status: Not on file   Intimate Partner Violence:     Fear of Current or Ex-Partner: Not on file    Emotionally Abused: Not on file    Physically Abused: Not on file    Sexually Abused: Not on file   Housing Stability:     Unable to Pay for Housing in the Last Year: Not on file    Number of [place] in the Last Year: Not on file    Unstable Housing in the Last Year: Not on file     Prior to Admission medications    Medication Sig Start Date End Date Taking? Authorizing Provider   cefUROXime (CEFTIN) 500 mg tablet Take 1 Tablet by mouth two (2) times a day. 6/27/22  Yes Ro Tavarez MD   levothyroxine (SYNTHROID) 50 mcg tablet TAKE 1 TABLET EVERY DAY 5/10/22  Yes Ro Tavarez MD   fish oil-dha-epa 4,619-365-397 mg cap Take 1 Tablet by mouth daily. Yes Provider, Historical   hydroCHLOROthiazide (HYDRODIURIL) 25 mg tablet TAKE 1 TABLET BY MOUTH EVERY DAY 10/22/21  Yes Ro Tavarez MD   amLODIPine (NORVASC) 2.5 mg tablet TAKE 1 TABLET EVERY DAY 9/13/21  Yes Ro Tavarez MD   nateglinide (STARLIX) 120 mg tablet TAKE 1 TABLET THREE TIMES DAILY BEFORE MEALS 8/13/21  Yes Ro Tavarez MD   metFORMIN ER (GLUCOPHAGE XR) 500 mg tablet TAKE 1 TABLET BY MOUTH TWICE A DAY WITH MEALS 8/10/21  Yes Ro Tavarez MD   diclofenac EC (VOLTAREN) 75 mg EC tablet TAKE 1 TABLET TWICE A DAY WITH FOOD FOR PAIN AND INFLAMMATION 6/14/21  Yes Ro Tavarez MD   GUAIFENESIN/PSEUDOEPHEDRNE HCL [facility] [place] D PO) Take  by mouth as needed. Yes Provider, Historical   MULTIVIT,TH IRON,OTHER MIN (COMPLETE MULTIVITAMIN PO) Take 1 Tab by mouth daily. Indications: Complete multivitamin Women's 50+   Yes Other, MD Cordelia   calcium-cholecalciferol, D3, (CALTRATE 600+D) tablet Take 1 Tab by mouth daily. Yes Other, MD Cordelia        Review of Systems              Constitutional:  She denies fever, weight loss, sweats or fatigue. EYES: No blurred or double vision,               ENT: no nasal congestion, no headache or dizziness. No difficulty with               swallowing, taste, speech or smell. Respiratory:  No cough, wheezing or shortness of breath. No sputum production. Cardiac:  Denies chest pain, palpitations, unexplained indigestion, syncope, edema, PND or orthopnea. GI:  No changes in bowel movements, no abdominal pain, no bloating, anorexia, nausea, vomiting or heartburn. :  No frequency or dysuria. Denies incontinence or sexual dysfunction.   Extremities:  No joint pain, stiffness or swelling  Back:.no pain or soreness  Skin:  No recent rashes or mole changes. Neurological:  No numbness, tingling, burning paresthesias or loss of motor strength. No syncope, dizziness, frequent headaches or memory loss. Hematologic:  No easy bruising  Lymphatic: No lymph node enlargement    Objective:     Vitals:    06/27/22 1456 06/27/22 1539   BP: (!) 140/80 138/82   Pulse: 99    Resp: 16    Temp: 99 °F (37.2 °C)    TempSrc: Oral    SpO2: 94%    Weight: 184 lb 8 oz (83.7 kg)    Height: 5' 7\" (1.702 m)    PainSc:   0 - No pain        Body mass index is 28.9 kg/m². Physical Examination:              General Appearance:  Well-developed, well-nourished, no acute distress. HEENT:      Ears:  The TMs and ear canals were clear. Eyes:  The pupillary responses were normal.  Extraocular muscle function intact. Lids and conjunctiva not injected. Funduscopic exam revealed sharp disc margins. Nares: Clear w/o edema or erythema  Pharynx:  Clear with teeth in good repair. No masses were noted. Neck:  Supple without thyromegaly or adenopathy. No JVD noted. No carotid                bruits. Lungs:  Clear to auscultation and percussion. Cardiac:  Regular rate and rhythm without murmur. PMI not displaced. No gallop, rub or click. Abdominal: Soft, non-tender, no hepata-spleenomegally or masses  Extremities:  No clubbing, cyanosis or edema. Skin:  No rash or unusual mole changes noted. Lymph Nodes:  None felt in the cervical, supraclavicular, axillary or inguinal region. Neurological: . DTRs 2+ and symmetric. Station and gait normal.   Hematologic:   No purpura or petechiae        Assessment/Plan:         1. Acute non-recurrent maxillary sinusitis        Impressions/Plan:  Impression  1. Acute sinusitis we will treat this with Ceftin  Recheck if not resolved. Orders Placed This Encounter    cefUROXime (CEFTIN) 500 mg tablet       Follow-up and Dispositions    · Return if symptoms worsen or fail to improve.          No results found for any visits on 06/27/22. Mal Burris MD    The patient was given after the visit summary the patient verbalized an understanding of the plans and problems as explained.

## 2022-06-27 NOTE — PATIENT INSTRUCTIONS
Sinusitis: Care Instructions  Your Care Instructions     Sinusitis is an infection of the lining of the sinus cavities in your head. Sinusitis often follows a cold. It causes pain and pressure in your head and face. In most cases, sinusitis gets better on its own in 1 to 2 weeks. But some mild symptoms may last for several weeks. Sometimes antibiotics are needed. Follow-up care is a key part of your treatment and safety. Be sure to make and go to all appointments, and call your doctor if you are having problems. It's also a good idea to know your test results and keep a list of the medicines you take. How can you care for yourself at home? · Take an over-the-counter pain medicine, such as acetaminophen (Tylenol), ibuprofen (Advil, Motrin), or naproxen (Aleve). Read and follow all instructions on the label. · If the doctor prescribed antibiotics, take them as directed. Do not stop taking them just because you feel better. You need to take the full course of antibiotics. · Be careful when taking over-the-counter cold or flu medicines and Tylenol at the same time. Many of these medicines have acetaminophen, which is Tylenol. Read the labels to make sure that you are not taking more than the recommended dose. Too much acetaminophen (Tylenol) can be harmful. · Breathe warm, moist air from a steamy shower, a hot bath, or a sink filled with hot water. Avoid cold, dry air. Using a humidifier in your home may help. Follow the directions for cleaning the machine. · Use saline (saltwater) nasal washes. This can help keep your nasal passages open and wash out mucus and bacteria. You can buy saline nose drops at a grocery store or drugstore. Or you can make your own at home by adding 1 teaspoon of salt and 1 teaspoon of baking soda to 2 cups of distilled water. If you make your own, fill a bulb syringe with the solution, insert the tip into your nostril, and squeeze gently. Deepa Jeff your nose.   · Put a hot, wet towel or a warm gel pack on your face 3 or 4 times a day for 5 to 10 minutes each time. · Try a decongestant nasal spray like oxymetazoline (Afrin). Do not use it for more than 3 days in a row. Using it for more than 3 days can make your congestion worse. When should you call for help? Call your doctor now or seek immediate medical care if:    · You have new or worse swelling or redness in your face or around your eyes.     · You have a new or higher fever. Watch closely for changes in your health, and be sure to contact your doctor if:    · You have new or worse facial pain.     · The mucus from your nose becomes thicker (like pus) or has new blood in it.     · You are not getting better as expected. Where can you learn more? Go to http://www.gray.com/  Enter Q761 in the search box to learn more about \"Sinusitis: Care Instructions. \"  Current as of: September 8, 2021               Content Version: 13.2  © 2006-2022 [employer] Care instructions adapted under license by [employer] (which disclaims liability or warranty for this information). If you have questions about a medical condition or this instruction, always ask your healthcare professional. [place] [address] any warranty or liability for your use of this information.

## 2022-07-05 DIAGNOSIS — M13.0 ARTHRITIS OF MULTIPLE SITES: ICD-10-CM

## 2022-07-05 RX ORDER — DICLOFENAC SODIUM 75 MG/1
TABLET, DELAYED RELEASE ORAL
Qty: 60 TABLET | Refills: 10 | Status: SHIPPED | OUTPATIENT
Start: 2022-07-05

## 2022-07-05 NOTE — TELEPHONE ENCOUNTER
PCP: Ruben Hernandez MD    Last appt: Visit date not found  Future Appointments   Date Time Provider Zeny Oneil   8/5/2022 10:10 AM Ruben Hernandez MD [facility] AMB       Last refilled:6/14/21    Requested Prescriptions     Pending Prescriptions Disp Refills    diclofenac EC (VOLTAREN) 75 mg EC tablet [Pharmacy Med Name: DICLOFENAC 75MG DR] 60 Tablet 10     Sig: TAKE 1 TABLET TWICE A DAY WITH FOOD FOR PAIN AND INFLAMMATION BREAKFAST AND SUPPER

## 2022-08-04 NOTE — PROGRESS NOTES
Chief Complaint   Patient presents with    Hypertension     3 month    Diabetes    Osteoarthritis    Chronic Kidney Disease    Cholesterol Problem       SUBJECTIVE:    Meg Garcia is a 69 y.o. female who returns in follow-up for her medical problems include hypertension, diabetes, hyperlipidemia, DJD, CKD stage II, paroxysmal atrial fibrillation, history of Crohn's disease and other multiple medical problems. She is taking her medication trying to follow her diet try and remain physically active. She currently denies any chest pain, shortness of breath, palpitations, PND, orthopnea or other cardiac or respiratory complaints. She notes no current GI or  complaints. She notes no headaches, dizziness or neurologic complaints. There are no current active arthritic complaints and there are no other complaints on complete review of systems. Current Outpatient Medications   Medication Sig Dispense Refill    diclofenac EC (VOLTAREN) 75 mg EC tablet TAKE 1 TABLET TWICE A DAY WITH FOOD FOR PAIN AND INFLAMMATION BREAKFAST AND SUPPER 60 Tablet 10    levothyroxine (SYNTHROID) 50 mcg tablet TAKE 1 TABLET EVERY DAY 30 Tablet 11    fish oil-dha-epa 1,200-144-216 mg cap Take 1 Tablet by mouth daily. hydroCHLOROthiazide (HYDRODIURIL) 25 mg tablet TAKE 1 TABLET BY MOUTH EVERY DAY 30 Tablet 11    amLODIPine (NORVASC) 2.5 mg tablet TAKE 1 TABLET EVERY DAY 30 Tablet 11    nateglinide (STARLIX) 120 mg tablet TAKE 1 TABLET THREE TIMES DAILY BEFORE MEALS 90 Tablet 11    metFORMIN ER (GLUCOPHAGE XR) 500 mg tablet TAKE 1 TABLET BY MOUTH TWICE A DAY WITH MEALS 180 Tablet 3    GUAIFENESIN/PSEUDOEPHEDRNE HCL (MUCINEX D PO) Take  by mouth as needed. MULTIVIT,TH IRON,OTHER MIN (COMPLETE MULTIVITAMIN PO) Take 1 Tab by mouth daily. Indications: Complete multivitamin Women's 50+      calcium-cholecalciferol, D3, (CALTRATE 600+D) tablet Take 1 Tab by mouth daily.        Past Medical History:   Diagnosis Date    Acquired hypothyroidism 7/26/2017    CKD (chronic kidney disease) 7/26/2017    Cro[facility]'s disease ([facility]     Diabetes ([facility] [facility]     Hypertension     On statin therapy 7/26/2017    Pleurisy 07/17/2019    Post-menopausal 7/26/2017     Past Surgical History:   Procedure Laterality Date    COLONOSCOPY N/A 5/1/2018    COLONOSCOPY performed by Basilia Rush MD at [facility] ENDOSCOPY    COLONOSCOPY N/A 10/5/2021    COLONOSCOPY performed by Arnaldo Pagan MD at [facility] ENDOSCOPY    COLONOSCOPY,DIAGNOSTIC  5/1/2018         COLONOSCOPY,DIAGNOSTIC  10/5/2021         COLONOSCOPY,DIAGNOSTIC  10/8/2021         HX BREAST BIOPSY Right     over 30 years  negative    HX GI      tumor from colon removed    HX ORTHOPAEDIC      right wrist    HX OTHER SURGICAL      Bowel Obstruction    IL BREAST SURGERY PROCEDURE UNLISTED      cyst removed right    IL COLONOSCOPY W/BIOPSY SINGLE/MULTIPLE  4/9/2014          Allergies   Allergen Reactions    Lisinopril Cough       REVIEW OF SYSTEMS:  General: negative for - chills or fever, or weight loss or gain  ENT: negative for - headaches, nasal congestion or tinnitus  Eyes: no blurred or visual changes  Neck: No stiffness or swollen nodes  Respiratory: negative for - cough, hemoptysis, shortness of breath or wheezing  Cardiovascular : negative for - chest pain, edema, palpitations or shortness of breath  Gastrointestinal: negative for - abdominal pain, blood in stools, heartburn or nausea/vomiting  Genito-Urinary: no dysuria, trouble voiding, or hematuria  Musculoskeletal: negative for - gait disturbance, joint pain, joint stiffness or joint swelling  Neurological: no TIA or stroke symptoms  Hematologic: no bruises, no bleeding  Lymphatic: no swollen glands  Integument: no lumps, mole changes, nail changes or rash  Endocrine:no malaise/lethargy poly uria or polydipsia or unexpected weight changes        Social History     Socioeconomic History    Marital status:    Tobacco Use    Smoking status: Former     Packs/day: 1.00     Years: 25.00     Pack years: 25.00     Types: Cigarettes     Quit date: 5     Years since quittin.6    Smokeless tobacco: Never   Vaping Use    Vaping Use: Never used   Substance and Sexual Activity    Alcohol use: Yes     Comment: 2 drinks per month     Drug use: No    Sexual activity: Never     Family History   Problem Relation Age of Onset    Diabetes Mother     Heart Failure Mother     Cancer Father         stomach       OBJECTIVE:     Visit Vitals  /84 (BP 1 Location: Left upper arm, BP Patient Position: Sitting, BP Cuff Size: Small adult)   Pulse 98   Temp 98.6 °F (37 °C) (Oral)   Resp 16   Ht 5' 7\" (1.702 m)   Wt 187 lb 12.8 oz (85.2 kg)   SpO2 94%   BMI 29.41 kg/m²     CONSTITUTIONAL:   well nourished, appears age appropriate  EYES: sclera anicteric, PERRL, EOMI  ENMT:nares clear, moist mucous membranes, pharynx clear  NECK: supple. Thyroid normal, No JVD or bruits  RESPIRATORY: Chest: clear to ascultation and percussion, normal inspiratory effort  CARDIOVASCULAR: Heart: regular rate and rhythm no murmurs, rubs or gallops, PMI not displaced, No thrills, no peripheral edema  GASTROINTESTINAL: Abdomen: non distended, soft, non tender, bowel sounds normal  HEMATOLOGIC: no purpura, petechiae or bruising  LYMPHATIC: No lymph node enlargemant  MUSCULOSKELETAL: Extremities: no active synovitis, pulse 1+   INTEGUMENT: No unusual rashes or suspicious skin lesions noted. Nails appear normal.  PERIPHERAL VASCULAR: normal pulses femoral, PT and DP  NEUROLOGIC: non-focal exam, A & O X 3  PSYCHIATRIC:, appropriate affect     ASSESSMENT:   1. Essential hypertension    2. Controlled type 2 diabetes mellitus with stage 2 chronic kidney disease, without long-term current use of insulin ([address]    3. Mixed hyperlipidemia    4. Primary osteoarthritis involving multiple joints    5. PAF (paroxysmal atrial fibrillation) (HCC)    6. Stage 2 chronic kidney disease    7.  Crohn's disease of both small and large intestine without complication (HCC)      Impression  1. Hypertension that is controlled so continue current therapy reviewed with her. 2.  Diabetes repeat status pending prior lab reviewed and I will adjust if necessary. 3.  Hyperlipidemia prior lab reviewed repeat status is pending  4. DJD stable  5. Paroxysmal atrial fibrillation stable  6. CKD stage III repeat status pending  7. Crohn's disease currently quiescent  I will call with lab and follow stable we will continue same. Follow-up again in 3 months or sooner if there is a problem. PLAN:  .  Orders Placed This Encounter    METABOLIC PANEL, COMPREHENSIVE    LIPID PANEL    CK    HEMOGLOBIN A1C WITH EAG         ATTENTION:   This medical record was transcribed using an electronic medical records system. Although proofread, it may and can contain electronic and spelling errors. Other human spelling and other errors may be present. Corrections may be executed at a later time. Please feel free to contact us for any clarifications as needed. Follow-up and Dispositions    Return in about 3 months (around 11/5/2022). No results found for any visits on 08/05/22. Tuan Pryor MD    The patient verbalized understanding of the problems and plans as explained.

## 2022-08-05 ENCOUNTER — OFFICE VISIT (OUTPATIENT)
Dept: INTERNAL MEDICINE CLINIC | Age: 77
End: 2022-08-05
Payer: MEDICARE

## 2022-08-05 VITALS
RESPIRATION RATE: 16 BRPM | TEMPERATURE: 98.6 F | SYSTOLIC BLOOD PRESSURE: 130 MMHG | WEIGHT: 187.8 LBS | HEART RATE: 98 BPM | HEIGHT: 67 IN | BODY MASS INDEX: 29.47 KG/M2 | OXYGEN SATURATION: 94 % | DIASTOLIC BLOOD PRESSURE: 84 MMHG

## 2022-08-05 DIAGNOSIS — I10 ESSENTIAL HYPERTENSION: Primary | ICD-10-CM

## 2022-08-05 DIAGNOSIS — E78.2 MIXED HYPERLIPIDEMIA: ICD-10-CM

## 2022-08-05 DIAGNOSIS — N18.2 CONTROLLED TYPE 2 DIABETES MELLITUS WITH STAGE 2 CHRONIC KIDNEY DISEASE, WITHOUT LONG-TERM CURRENT USE OF INSULIN (HCC): ICD-10-CM

## 2022-08-05 DIAGNOSIS — I48.0 PAF (PAROXYSMAL ATRIAL FIBRILLATION) (HCC): ICD-10-CM

## 2022-08-05 DIAGNOSIS — N18.2 STAGE 2 CHRONIC KIDNEY DISEASE: ICD-10-CM

## 2022-08-05 DIAGNOSIS — E11.22 CONTROLLED TYPE 2 DIABETES MELLITUS WITH STAGE 2 CHRONIC KIDNEY DISEASE, WITHOUT LONG-TERM CURRENT USE OF INSULIN (HCC): ICD-10-CM

## 2022-08-05 DIAGNOSIS — K50.80 CROHN'S DISEASE OF BOTH SMALL AND LARGE INTESTINE WITHOUT COMPLICATION (HCC): ICD-10-CM

## 2022-08-05 DIAGNOSIS — M15.9 PRIMARY OSTEOARTHRITIS INVOLVING MULTIPLE JOINTS: ICD-10-CM

## 2022-08-05 LAB
ALBUMIN SERPL-MCNC: 3.9 G/DL (ref 3.5–5)
ALBUMIN/GLOB SERPL: 1.1 {RATIO} (ref 1.1–2.2)
ALP SERPL-CCNC: 78 U/L (ref 45–117)
ALT SERPL-CCNC: 34 U/L (ref 12–78)
ANION GAP SERPL CALC-SCNC: 8 MMOL/L (ref 5–15)
AST SERPL-CCNC: 21 U/L (ref 15–37)
BILIRUB SERPL-MCNC: 0.6 MG/DL (ref 0.2–1)
BUN SERPL-MCNC: 20 MG/DL (ref 6–20)
BUN/CREAT SERPL: 24 (ref 12–20)
CALCIUM SERPL-MCNC: 9.8 MG/DL (ref 8.5–10.1)
CHLORIDE SERPL-SCNC: 100 MMOL/L (ref 97–108)
CHOLEST SERPL-MCNC: 225 MG/DL
CK SERPL-CCNC: 208 U/L (ref 26–192)
CO2 SERPL-SCNC: 30 MMOL/L (ref 21–32)
CREAT SERPL-MCNC: 0.82 MG/DL (ref 0.55–1.02)
EST. AVERAGE GLUCOSE BLD GHB EST-MCNC: 140 MG/DL
GLOBULIN SER CALC-MCNC: 3.5 G/DL (ref 2–4)
GLUCOSE SERPL-MCNC: 152 MG/DL (ref 65–100)
HBA1C MFR BLD: 6.5 % (ref 4–5.6)
HDLC SERPL-MCNC: 50 MG/DL
HDLC SERPL: 4.5 {RATIO} (ref 0–5)
LDLC SERPL CALC-MCNC: 112.2 MG/DL (ref 0–100)
POTASSIUM SERPL-SCNC: 4.2 MMOL/L (ref 3.5–5.1)
PROT SERPL-MCNC: 7.4 G/DL (ref 6.4–8.2)
SODIUM SERPL-SCNC: 138 MMOL/L (ref 136–145)
TRIGL SERPL-MCNC: 314 MG/DL (ref ?–150)
VLDLC SERPL CALC-MCNC: 62.8 MG/DL

## 2022-08-05 PROCEDURE — 3044F HG A1C LEVEL LT 7.0%: CPT | Performed by: INTERNAL MEDICINE

## 2022-08-05 PROCEDURE — 1101F PT FALLS ASSESS-DOCD LE1/YR: CPT | Performed by: INTERNAL MEDICINE

## 2022-08-05 PROCEDURE — 1090F PRES/ABSN URINE INCON ASSESS: CPT | Performed by: INTERNAL MEDICINE

## 2022-08-05 PROCEDURE — 99214 OFFICE O/P EST MOD 30 MIN: CPT | Performed by: INTERNAL MEDICINE

## 2022-08-05 PROCEDURE — G8754 DIAS BP LESS 90: HCPCS | Performed by: INTERNAL MEDICINE

## 2022-08-05 PROCEDURE — G8417 CALC BMI ABV UP PARAM F/U: HCPCS | Performed by: INTERNAL MEDICINE

## 2022-08-05 PROCEDURE — G8536 NO DOC ELDER MAL SCRN: HCPCS | Performed by: INTERNAL MEDICINE

## 2022-08-05 PROCEDURE — G8510 SCR DEP NEG, NO PLAN REQD: HCPCS | Performed by: INTERNAL MEDICINE

## 2022-08-05 PROCEDURE — 1123F ACP DISCUSS/DSCN MKR DOCD: CPT | Performed by: INTERNAL MEDICINE

## 2022-08-05 PROCEDURE — G8427 DOCREV CUR MEDS BY ELIG CLIN: HCPCS | Performed by: INTERNAL MEDICINE

## 2022-08-05 PROCEDURE — G8399 PT W/DXA RESULTS DOCUMENT: HCPCS | Performed by: INTERNAL MEDICINE

## 2022-08-05 PROCEDURE — G8752 SYS BP LESS 140: HCPCS | Performed by: INTERNAL MEDICINE

## 2022-08-05 NOTE — PROGRESS NOTES
HIPAA verified by two patient identifiers. Karis Carrizales is a 69 y.o. female    Chief Complaint   Patient presents with    Hypertension     3 month    Diabetes    Osteoarthritis    Chronic Kidney Disease    Cholesterol Problem       Visit Vitals  /84 (BP 1 Location: Left upper arm, BP Patient Position: Sitting, BP Cuff Size: Small adult)   Pulse 98   Temp 98.6 °F (37 °C) (Oral)   Resp 16   Ht 5' 7\" (1.702 m)   Wt 187 lb 12.8 oz (85.2 kg)   SpO2 94%   BMI 29.41 kg/m²       Pain Scale: 0 - No pain/10  Pain Location:       Health Maintenance Due   Topic Date Due    DTaP/Tdap/Td series (1 - Tdap) Never done         Coordination of Care Questionnaire:  :   1) Have you been to an emergency room, urgent care, or hospitalized since your last visit? If yes, where when, and reason for visit? no       2. Have seen or consulted any other health care provider since your last visit? If yes, where when, and reason for visit? NO      Patient is accompanied by self I have received verbal consent from Karis Carrizales to discuss any/all medical information while they are present in the room.

## 2022-08-08 RX ORDER — METFORMIN HYDROCHLORIDE 500 MG/1
TABLET, EXTENDED RELEASE ORAL
Qty: 180 TABLET | Refills: 2 | Status: SHIPPED | OUTPATIENT
Start: 2022-08-08

## 2022-08-08 NOTE — TELEPHONE ENCOUNTER
PCP: Alonso Spann MD    Last appt: 8/5/2022  Future Appointments   Date Time Provider Zeny Oneil   8/22/2022  1:00 PM Diomedes Luu PA-C [facility] AMB   11/11/2022  9:40 AM Alonso Spann MD [facility] AMB       Last refilled:8/10/21    Requested Prescriptions     Pending Prescriptions Disp Refills    metFORMIN ER (GLUCOPHAGE XR) 500 mg tablet [Pharmacy Med Name: *METFORMIN 500MG ER] 180 Tablet 2     Sig: TAKE 1 TABLET BY MOUTH TWICE A DAY WITH MEALS

## 2022-08-09 NOTE — PROGRESS NOTES
Please call the patient regarding her abnormal result.  Cholesterol, LDL, bloodsugar and glycol are all a little higher so watch diet.    Lab letter sent

## 2022-08-22 ENCOUNTER — OFFICE VISIT (OUTPATIENT)
Dept: INTERNAL MEDICINE CLINIC | Age: 77
End: 2022-08-22
Payer: MEDICARE

## 2022-08-22 VITALS
HEART RATE: 95 BPM | SYSTOLIC BLOOD PRESSURE: 116 MMHG | HEIGHT: 67 IN | RESPIRATION RATE: 16 BRPM | OXYGEN SATURATION: 95 % | TEMPERATURE: 98.3 F | DIASTOLIC BLOOD PRESSURE: 76 MMHG | BODY MASS INDEX: 29.26 KG/M2 | WEIGHT: 186.4 LBS

## 2022-08-22 DIAGNOSIS — Z01.818 PREOP EXAMINATION: Primary | ICD-10-CM

## 2022-08-22 DIAGNOSIS — H26.9 CATARACT OF BOTH EYES, UNSPECIFIED CATARACT TYPE: ICD-10-CM

## 2022-08-22 DIAGNOSIS — E11.22 CONTROLLED TYPE 2 DIABETES MELLITUS WITH STAGE 2 CHRONIC KIDNEY DISEASE, WITHOUT LONG-TERM CURRENT USE OF INSULIN (HCC): ICD-10-CM

## 2022-08-22 DIAGNOSIS — N18.2 CONTROLLED TYPE 2 DIABETES MELLITUS WITH STAGE 2 CHRONIC KIDNEY DISEASE, WITHOUT LONG-TERM CURRENT USE OF INSULIN (HCC): ICD-10-CM

## 2022-08-22 DIAGNOSIS — E78.2 MIXED HYPERLIPIDEMIA: ICD-10-CM

## 2022-08-22 DIAGNOSIS — I10 ESSENTIAL HYPERTENSION: ICD-10-CM

## 2022-08-22 PROCEDURE — G8417 CALC BMI ABV UP PARAM F/U: HCPCS | Performed by: PHYSICIAN ASSISTANT

## 2022-08-22 PROCEDURE — G8399 PT W/DXA RESULTS DOCUMENT: HCPCS | Performed by: PHYSICIAN ASSISTANT

## 2022-08-22 PROCEDURE — 99213 OFFICE O/P EST LOW 20 MIN: CPT | Performed by: PHYSICIAN ASSISTANT

## 2022-08-22 PROCEDURE — G8752 SYS BP LESS 140: HCPCS | Performed by: PHYSICIAN ASSISTANT

## 2022-08-22 PROCEDURE — 1123F ACP DISCUSS/DSCN MKR DOCD: CPT | Performed by: PHYSICIAN ASSISTANT

## 2022-08-22 PROCEDURE — 3044F HG A1C LEVEL LT 7.0%: CPT | Performed by: PHYSICIAN ASSISTANT

## 2022-08-22 PROCEDURE — G8536 NO DOC ELDER MAL SCRN: HCPCS | Performed by: PHYSICIAN ASSISTANT

## 2022-08-22 PROCEDURE — G8427 DOCREV CUR MEDS BY ELIG CLIN: HCPCS | Performed by: PHYSICIAN ASSISTANT

## 2022-08-22 PROCEDURE — G8754 DIAS BP LESS 90: HCPCS | Performed by: PHYSICIAN ASSISTANT

## 2022-08-22 PROCEDURE — 1101F PT FALLS ASSESS-DOCD LE1/YR: CPT | Performed by: PHYSICIAN ASSISTANT

## 2022-08-22 PROCEDURE — 1090F PRES/ABSN URINE INCON ASSESS: CPT | Performed by: PHYSICIAN ASSISTANT

## 2022-08-22 PROCEDURE — G8432 DEP SCR NOT DOC, RNG: HCPCS | Performed by: PHYSICIAN ASSISTANT

## 2022-08-22 RX ORDER — NATEGLINIDE 120 MG/1
TABLET ORAL
Qty: 90 TABLET | Refills: 5 | Status: SHIPPED | OUTPATIENT
Start: 2022-08-22

## 2022-08-22 NOTE — PROGRESS NOTES
HIPAA verified by two patient identifiers. Batsheva Stern is a 69 y.o. female    Chief Complaint   Patient presents with    Pre-op Exam     cataract       Visit Vitals  /76 (BP 1 Location: Left upper arm, BP Patient Position: Sitting, BP Cuff Size: Adult long)   Pulse 95   Temp 98.3 °F (36.8 °C) (Oral)   Resp 16   Ht 5' 7\" (1.702 m)   Wt 186 lb 6.4 oz (84.6 kg)   SpO2 95%   BMI 29.19 kg/m²       Pain Scale: 0 - No pain/10  Pain Location:       Health Maintenance Due   Topic Date Due    DTaP/Tdap/Td series (1 - Tdap) Never done         Coordination of Care Questionnaire:  :   1) Have you been to an emergency room, urgent care, or hospitalized since your last visit? If yes, where when, and reason for visit? no       2. Have seen or consulted any other health care provider since your last visit? If yes, where when, and reason for visit? NO      Patient is accompanied by self I have received verbal consent from Batsheva Stern to discuss any/all medical information while they are present in the room.

## 2022-08-22 NOTE — PATIENT INSTRUCTIONS
Stop the fish oil 1 week prior to the first surgery, and then resume it after the second eye is complete. Do not take the metformin or Starlix on the morning of surgery, but take it with food when you get home and eat your first meal.     Take your blood pressure medicines (HCTZ and amlodipine) on the morning of surgery.

## 2022-08-22 NOTE — PROGRESS NOTES
69 y.o. F presents for pre-op exam for Bilateral cataract removal surgery on 9/7/22 OS and 9/21/22 OD with Dr. Yesenia Brown. Bilateral cataract with increasing blurry vision, especially at night. Diabetes  Controlled on metformin and Starlix, A1C 6.5% on 8/2022    HTN - controlled on HCTZ and amlodipine    Hyperlipidemia, on fish oil    Patient Active Problem List    Diagnosis    Hammer toe of second toe of left foot    Neck pain    Edema    Alcohol screening    Pleurisy    Chest pain on breathing    Acute non-recurrent maxillary sinusitis    Acute bronchitis    Medicare annual wellness visit, subsequent    Colon cancer screening    Post-menopausal    Acquired hypothyroidism    Stage 2 chronic kidney disease    PAF (paroxysmal atrial fibrillation) ([address]     Self Limiting      SBO (small bowel obstruction) ([address]    Crohn disease ([address]    Essential hypertension    Controlled type 2 diabetes mellitus with stage 2 chronic kidney disease, without long-term current use of insulin ([address]    Mixed hyperlipidemia    Primary osteoarthritis involving multiple joints       Past Medical History:   Diagnosis Date    Acquired hypothyroidism 7/26/2017    CKD (chronic kidney disease) 7/26/2017    Crohn's disease ([address]     Diabetes ([address]     Hypertension     On statin therapy 7/26/2017    Pleurisy 07/17/2019    Post-menopausal 7/26/2017       No cardiac history. No chronic kidney disease. No obstructive sleep apnea.       Past Surgical History:   Procedure Laterality Date    COLONOSCOPY N/A 5/1/2018    COLONOSCOPY performed by Basilia Rush MD at [facility] ENDOSCOPY    COLONOSCOPY N/A 10/5/2021    COLONOSCOPY performed by Arnaldo Pagan MD at [facility] ENDOSCOPY    COLONOSCOPY,DIAGNOSTIC  5/1/2018         COLONOSCOPY,DIAGNOSTIC  10/5/2021         COLONOSCOPY,DIAGNOSTIC  10/8/2021         HX BREAST BIOPSY Right     over 30 years  negative    HX GI      tumor from colon removed    HX ORTHOPAEDIC      right wrist    HX OTHER SURGICAL      Bowel Obstruction    NC BREAST SURGERY PROCEDURE UNLISTED      cyst removed right    NC COLONOSCOPY W/BIOPSY SINGLE/MULTIPLE  2014            No history of adverse anesthesia reactions. No prior bleeding or clotting complications. Family History   Problem Relation Age of Onset    Diabetes Mother     Heart Failure Mother     Cancer Father         stomach       Social History     Tobacco Use    Smoking status: Former     Packs/day: 1.00     Years: 25.00     Pack years: 25.00     Types: Cigarettes     Quit date:      Years since quittin.6    Smokeless tobacco: Never   Vaping Use    Vaping Use: Never used   Substance Use Topics    Alcohol use: Yes     Comment: 2 drinks per month     Drug use: No       Social History     Social History Narrative    Not on file       Outpatient Medications Marked as Taking for the 22 encounter (Office Visit) with Debi Luu PA-C   Medication Sig Dispense Refill    metFORMIN ER (GLUCOPHAGE XR) 500 mg tablet TAKE 1 TABLET BY MOUTH TWICE A DAY WITH MEALS 180 Tablet 2    diclofenac EC (VOLTAREN) 75 mg EC tablet TAKE 1 TABLET TWICE A DAY WITH FOOD FOR PAIN AND INFLAMMATION BREAKFAST AND SUPPER 60 Tablet 10    levothyroxine (SYNTHROID) 50 mcg tablet TAKE 1 TABLET EVERY DAY 30 Tablet 11    fish oil-dha-epa 1,200-144-216 mg cap Take 1 Tablet by mouth daily. hydroCHLOROthiazide (HYDRODIURIL) 25 mg tablet TAKE 1 TABLET BY MOUTH EVERY DAY 30 Tablet 11    amLODIPine (NORVASC) 2.5 mg tablet TAKE 1 TABLET EVERY DAY 30 Tablet 11    nateglinide (STARLIX) 120 mg tablet TAKE 1 TABLET THREE TIMES DAILY BEFORE MEALS 90 Tablet 11    GUAIFENESIN/PSEUDOEPHEDRNE HCL (MUCINEX D PO) Take  by mouth as needed. MULTIVIT,TH IRON,OTHER MIN (COMPLETE MULTIVITAMIN PO) Take 1 Tab by mouth daily. Indications: Complete multivitamin Women's 50+      calcium-cholecalciferol, D3, (CALTRATE 600+D) tablet Take 1 Tab by mouth daily.          Allergies   Allergen Reactions    Lisinopril Cough       No latex allergy. Review of Systems:  Good exercise tolerance. No recent acute illness. Gen: no fatigue, fever, chills, weight loss or weight gain  Eyes: no excessive tearing, itching, or discharge  Nose: no rhinorrhea, no sinus pain  Mouth: no oral lesions, no sore throat  Resp: no shortness of breath, no wheezing, no cough  CV: no chest pain, no orthopnea, no paroxysmal nocturnal dyspnea, no lower extremity edema, no palpitations  Abd: no nausea, no heartburn, no diarrhea, no constipation, no abdominal pain  Neuro: no headaches, no syncope or presyncopal episodes  Endo: no polyuria, no polydipsia  Heme: no lymphadenopathy, no easy bruising or bleeding    Visit Vitals  /76 (BP 1 Location: Left upper arm, BP Patient Position: Sitting, BP Cuff Size: Adult long)   Pulse 95   Temp 98.3 °F (36.8 °C) (Oral)   Resp 16   Ht 5' 7\" (1.702 m)   Wt 186 lb 6.4 oz (84.6 kg)   SpO2 95%   BMI 29.19 kg/m²     Gen: alert, oriented, no acute distress  Ears: external auditory canals clear, TMs without erythema or effusion  Eyes: pupils equal round reactive to light, sclera clear, conjunctiva clear  Oral: moist mucus membranes, no oral lesions, no pharyngeal inflammation or exudate  Neck: thyroid symmetric and not enlarged, no carotid bruits, no jugular vein distention  Resp: no increase work of breathing, lungs clear to ausculation bilaterally, no wheezing, rales or rhonchi  CV: S1, S2 normal. No murmurs, rubs, or gallops. Abd: soft, not tender, not distended. No hepatosplenomegaly. Normal bowel sounds. Neuro: cranial nerves intact, normal strength and movement in all extremities, reflexes and sensation intact and symmetric.   Skin: no lesion or rash  Extremities: no cyanosis or edema    Lab Results   Component Value Date/Time    WBC 10.0 01/05/2022 02:46 PM    Hemoglobin (POC) 12.2 04/23/2015 01:44 PM    HGB (POC) 13.3 09/20/2018 01:47 PM    HGB 12.9 01/05/2022 02:46 PM Hematocrit (POC) 36 04/23/2015 01:44 PM    HCT (POC) 38.1 09/20/2018 01:47 PM    HCT 39.3 01/05/2022 02:46 PM    PLATELET 797 77/62/4558 02:46 PM    MCV 97.5 01/05/2022 02:46 PM       Lab Results   Component Value Date/Time    Hemoglobin A1c 6.5 (H) 08/05/2022 10:58 AM    Hemoglobin A1c (POC) 5.9 (A) 05/06/2021 10:29 AM       Lab Results   Component Value Date/Time    Sodium 138 08/05/2022 10:58 AM    Potassium 4.2 08/05/2022 10:58 AM    Chloride 100 08/05/2022 10:58 AM    CO2 30 08/05/2022 10:58 AM    Anion gap 8 08/05/2022 10:58 AM    Glucose 152 (H) 08/05/2022 10:58 AM    BUN 20 08/05/2022 10:58 AM    Creatinine 0.82 08/05/2022 10:58 AM    BUN/Creatinine ratio 24 (H) 08/05/2022 10:58 AM    GFR est AA >60 08/05/2022 10:58 AM    GFR est non-AA >60 08/05/2022 10:58 AM    Calcium 9.8 08/05/2022 10:58 AM    Bilirubin, total 0.6 08/05/2022 10:58 AM    Alk. phosphatase 78 08/05/2022 10:58 AM    Protein, total 7.4 08/05/2022 10:58 AM    Albumin 3.9 08/05/2022 10:58 AM    Globulin 3.5 08/05/2022 10:58 AM    A-G Ratio 1.1 08/05/2022 10:58 AM    ALT (SGPT) 34 08/05/2022 10:58 AM       No results found for: INR, PTMR, PTP, PT1, PT2, INREXT    Assessment/Plan:      ICD-10-CM ICD-9-CM    1. Preop examination  Z01.818 V72.84       2. Cataract of both eyes, unspecified cataract type  H26.9 366.9       3. Controlled type 2 diabetes mellitus with stage 2 chronic kidney disease, without long-term current use of insulin (HCC)  E11.22 250.40     N18.2 585.2       4. Essential hypertension  I10 401.9       5. Mixed hyperlipidemia  E78.2 272.2         1. Preop exam  Scheduled for bilateral cataract removal by Dr. Dionte Kelsey with [address] [facility] [address] on 9/7/2022 and OD on 9/21/2022    2. Cataracts of both eyes  Vision is becoming increasingly blurry especially at night and interfering with ADLs    3.   Diabetes  Controlled on metformin and Starlix, most recent A1c 6.5% on 8/5/2022  She is instructed to stop or hold her metformin and Starlix on the morning of her surgery and take it when she gets home with her first meal next    4. Hypertension  Controlled on current dose of hydrochlorothiazide and amlodipine  She will take her hyper tension medications on the morning of her surgery next    5. Hyperlipidemia  She is advised to hold fish oil 1 week prior to her procedure and will resume after the second eye is completed    Preop forms filled out and will be faxed according to instructions      Medically cleared for above procedure. 7 days prior to surgery, hold the following medications:  Fish oil    On the morning of surgery, take:  HCTZ, amlodipine (HOLD metformin and Starlix)    Verbal and written instructions (see AVS) provided. Patient expresses understanding of diagnosis and treatment plan. Follow-up and Dispositions    Return for as previously scheduled.        Future Appointments   Date Time Provider Zeny Oneil   11/11/2022  9:40 AM Jacob Moore MD [facility] AMB

## 2022-08-22 NOTE — TELEPHONE ENCOUNTER
RX refill request from the patient/pharmacy. Patient last seen 08- with labs, and next appt. scheduled for 08-  Requested Prescriptions     Pending Prescriptions Disp Refills    nateglinide (STARLIX) 120 mg tablet [Pharmacy Med Name: NATEGLINIDE 120MG] 90 Tablet 5     Sig: TAKE 1 TABLET BY MOUTH THREE TIMES DAILY BEFORE MEALS    .

## 2022-10-03 ENCOUNTER — TRANSCRIBE ORDER (OUTPATIENT)
Dept: SCHEDULING | Age: 77
End: 2022-10-03

## 2022-10-03 DIAGNOSIS — Z12.31 OTHER SCREENING MAMMOGRAM: Primary | ICD-10-CM

## 2022-10-18 RX ORDER — HYDROCHLOROTHIAZIDE 25 MG/1
TABLET ORAL
Qty: 30 TABLET | Refills: 11 | Status: SHIPPED | OUTPATIENT
Start: 2022-10-18

## 2022-10-18 NOTE — TELEPHONE ENCOUNTER
RX refill request from the patient/pharmacy. Patient last seen 08- with labs, and next appt. scheduled for 11-  Requested Prescriptions     Pending Prescriptions Disp Refills    hydroCHLOROthiazide (HYDRODIURIL) 25 mg tablet [Pharmacy Med Name: HYDROCHLOROTHIAZIDE 25MG] 30 Tablet 11     Sig: TAKE 1 TABLET BY MOUTH EVERY DAY    .

## 2022-11-02 ENCOUNTER — HOSPITAL ENCOUNTER (OUTPATIENT)
Dept: MAMMOGRAPHY | Age: 77
Discharge: HOME | End: 2022-11-02
Attending: INTERNAL MEDICINE
Payer: MEDICARE

## 2022-11-02 DIAGNOSIS — Z12.31 OTHER SCREENING MAMMOGRAM: ICD-10-CM

## 2022-11-02 PROCEDURE — 77063 BREAST TOMOSYNTHESIS BI: CPT

## 2022-11-07 DIAGNOSIS — I10 ESSENTIAL HYPERTENSION: ICD-10-CM

## 2022-11-07 RX ORDER — AMLODIPINE BESYLATE 2.5 MG/1
TABLET ORAL
Qty: 30 TABLET | Refills: 11 | Status: SHIPPED | OUTPATIENT
Start: 2022-11-07 | End: 2022-11-11 | Stop reason: SDUPTHER

## 2022-11-07 NOTE — TELEPHONE ENCOUNTER
RX refill request from the patient/pharmacy. Patient last seen 08- with labs, and next appt. scheduled for 11-  Requested Prescriptions     Pending Prescriptions Disp Refills    amLODIPine (NORVASC) 2.5 mg tablet [Pharmacy Med Name: AMLODIPINE 2.5MG] 30 Tablet 11     Sig: TAKE 1 TABLET BY MOUTH EVERY DAY    .

## 2022-11-10 PROBLEM — R07.1 CHEST PAIN ON BREATHING: Status: RESOLVED | Noted: 2018-11-13 | Resolved: 2022-11-10

## 2022-11-10 PROBLEM — M54.2 NECK PAIN: Status: RESOLVED | Noted: 2021-08-10 | Resolved: 2022-11-10

## 2022-11-10 PROBLEM — R09.1 PLEURISY: Status: RESOLVED | Noted: 2019-07-03 | Resolved: 2022-11-10

## 2022-11-10 PROBLEM — E55.9 VITAMIN D DEFICIENCY: Status: ACTIVE | Noted: 2022-11-10

## 2022-11-11 ENCOUNTER — OFFICE VISIT (OUTPATIENT)
Dept: INTERNAL MEDICINE CLINIC | Age: 77
End: 2022-11-11
Payer: MEDICARE

## 2022-11-11 VITALS
TEMPERATURE: 98.1 F | DIASTOLIC BLOOD PRESSURE: 100 MMHG | BODY MASS INDEX: 29.51 KG/M2 | HEIGHT: 67 IN | HEART RATE: 97 BPM | OXYGEN SATURATION: 95 % | WEIGHT: 188 LBS | SYSTOLIC BLOOD PRESSURE: 152 MMHG

## 2022-11-11 DIAGNOSIS — N18.2 CONTROLLED TYPE 2 DIABETES MELLITUS WITH STAGE 2 CHRONIC KIDNEY DISEASE, WITHOUT LONG-TERM CURRENT USE OF INSULIN (HCC): ICD-10-CM

## 2022-11-11 DIAGNOSIS — Z00.00 MEDICARE ANNUAL WELLNESS VISIT, SUBSEQUENT: ICD-10-CM

## 2022-11-11 DIAGNOSIS — K56.609 SBO (SMALL BOWEL OBSTRUCTION) (HCC): ICD-10-CM

## 2022-11-11 DIAGNOSIS — N18.2 STAGE 2 CHRONIC KIDNEY DISEASE: ICD-10-CM

## 2022-11-11 DIAGNOSIS — K50.80 CROHN'S DISEASE OF BOTH SMALL AND LARGE INTESTINE WITHOUT COMPLICATION (HCC): ICD-10-CM

## 2022-11-11 DIAGNOSIS — R09.1 PLEURISY: ICD-10-CM

## 2022-11-11 DIAGNOSIS — I48.0 PAF (PAROXYSMAL ATRIAL FIBRILLATION) (HCC): ICD-10-CM

## 2022-11-11 DIAGNOSIS — E03.9 ACQUIRED HYPOTHYROIDISM: ICD-10-CM

## 2022-11-11 DIAGNOSIS — E78.2 MIXED HYPERLIPIDEMIA: ICD-10-CM

## 2022-11-11 DIAGNOSIS — M15.9 PRIMARY OSTEOARTHRITIS INVOLVING MULTIPLE JOINTS: ICD-10-CM

## 2022-11-11 DIAGNOSIS — E11.22 CONTROLLED TYPE 2 DIABETES MELLITUS WITH STAGE 2 CHRONIC KIDNEY DISEASE, WITHOUT LONG-TERM CURRENT USE OF INSULIN (HCC): ICD-10-CM

## 2022-11-11 DIAGNOSIS — I10 ESSENTIAL HYPERTENSION: Primary | ICD-10-CM

## 2022-11-11 DIAGNOSIS — E55.9 VITAMIN D DEFICIENCY: ICD-10-CM

## 2022-11-11 DIAGNOSIS — Z13.39 ALCOHOL SCREENING: ICD-10-CM

## 2022-11-11 PROBLEM — H52.4 PRESBYOPIA: Status: ACTIVE | Noted: 2022-11-11

## 2022-11-11 PROCEDURE — 1123F ACP DISCUSS/DSCN MKR DOCD: CPT | Performed by: INTERNAL MEDICINE

## 2022-11-11 PROCEDURE — G8399 PT W/DXA RESULTS DOCUMENT: HCPCS | Performed by: INTERNAL MEDICINE

## 2022-11-11 PROCEDURE — G8417 CALC BMI ABV UP PARAM F/U: HCPCS | Performed by: INTERNAL MEDICINE

## 2022-11-11 PROCEDURE — 3044F HG A1C LEVEL LT 7.0%: CPT | Performed by: INTERNAL MEDICINE

## 2022-11-11 PROCEDURE — G8755 DIAS BP > OR = 90: HCPCS | Performed by: INTERNAL MEDICINE

## 2022-11-11 PROCEDURE — G8753 SYS BP > OR = 140: HCPCS | Performed by: INTERNAL MEDICINE

## 2022-11-11 PROCEDURE — G0439 PPPS, SUBSEQ VISIT: HCPCS | Performed by: INTERNAL MEDICINE

## 2022-11-11 PROCEDURE — G8510 SCR DEP NEG, NO PLAN REQD: HCPCS | Performed by: INTERNAL MEDICINE

## 2022-11-11 PROCEDURE — G8427 DOCREV CUR MEDS BY ELIG CLIN: HCPCS | Performed by: INTERNAL MEDICINE

## 2022-11-11 PROCEDURE — 3074F SYST BP LT 130 MM HG: CPT | Performed by: INTERNAL MEDICINE

## 2022-11-11 PROCEDURE — 1101F PT FALLS ASSESS-DOCD LE1/YR: CPT | Performed by: INTERNAL MEDICINE

## 2022-11-11 PROCEDURE — 1090F PRES/ABSN URINE INCON ASSESS: CPT | Performed by: INTERNAL MEDICINE

## 2022-11-11 PROCEDURE — 99214 OFFICE O/P EST MOD 30 MIN: CPT | Performed by: INTERNAL MEDICINE

## 2022-11-11 PROCEDURE — 3078F DIAST BP <80 MM HG: CPT | Performed by: INTERNAL MEDICINE

## 2022-11-11 PROCEDURE — G8536 NO DOC ELDER MAL SCRN: HCPCS | Performed by: INTERNAL MEDICINE

## 2022-11-11 RX ORDER — AMLODIPINE BESYLATE 5 MG/1
5 TABLET ORAL DAILY
Qty: 90 TABLET | OUTPATIENT
Start: 2022-11-11

## 2022-11-11 RX ORDER — PREDNISONE 5 MG/1
TABLET ORAL
Qty: 21 TABLET | Refills: 0 | Status: SHIPPED | OUTPATIENT
Start: 2022-11-11

## 2022-11-11 NOTE — PROGRESS NOTES
Chief Complaint   Patient presents with    Annual Wellness Visit     Vitals:    11/11/22 1028   BP: (!) 152/100   Pulse: 97   Temp: 98.1 °F (36.7 °C)   TempSrc: Oral   SpO2: 95%   Weight: 188 lb (85.3 kg)   Height: 5' 7\" (1.702 m)   PainSc:  10 - Worst pain ever   PainLoc: Back       Depression Risk Factor Screening:     3 most recent PHQ Screens 11/11/2022   Little interest or pleasure in doing things Not at all   Feeling down, depressed, irritable, or hopeless Not at all   Total Score PHQ 2 0       Functional Ability and Level of Safety:     Activities of Daily Living  ADL Assessment 11/11/2022   Feeding yourself No Help Needed   Getting from bed to chair No Help Needed   Getting dressed No Help Needed   Bathing or showering No Help Needed   Walk across the room (includes cane/walker) No Help Needed   Using the telphone No Help Needed   Taking your medications No Help Needed   Preparing meals No Help Needed   Managing money (expenses/bills) No Help Needed   Moderately strenuous housework (laundry) No Help Needed   Shopping for personal items (toiletries/medicines) No Help Needed   Shopping for groceries No Help Needed   Driving No Help Needed   Climbing a flight of stairs No Help Needed   Getting to places beyond walking distances No Help Needed       Fall Risk  Fall Risk Assessment, last 12 mths 11/11/2022   Able to walk? Yes   Fall in past 12 months? 0   Do you feel unsteady? 0   Are you worried about falling 0       Abuse Screen  Abuse Screening Questionnaire 11/11/2022   Do you ever feel afraid of your partner? N   Are you in a relationship with someone who physically or mentally threatens you? N   Is it safe for you to go home?  Y         Patient Care Team   Patient Care Team:  James Coronado MD as PCP - General (Internal Medicine Physician)  James Coronado MD as PCP - [facility] Empaneled Provider

## 2022-11-11 NOTE — PROGRESS NOTES
This is a Subsequent Medicare Annual Wellness Visit providing Personalized Prevention Plan Services (PPPS) (Performed 12 months after initial AWV and PPPS )    I have reviewed the patient's medical history in detail and updated the computerized patient record. She presents today for Medicare subsequent annual wellness examination screening questionnaire. She is also in follow-up of her multiple medical problems include hypertension, paroxysmal atrial fibrillation, diabetes, hyperlipidemia, Crohn's disease with history of small bowel obstruction, DJD, CKD stage II and other multiple medical problems. She feels like her pleurisy which she has had about once a year is acting up again this time of the left-sided does hurt to take a deep breath. She notes no shortness of breath. She denies any palpitations, PND, orthopnea or other cardiorespiratory complaints. She notes no GI or  complaints. She notes no headaches, dizziness or neurologic complaints. She notes no current changes or chronic arthritic complaints and there are no other complaints on complete review of systems.     History     Past Medical History:   Diagnosis Date    Acquired hypothyroidism 7/26/2017    CKD (chronic kidney disease) 7/26/2017    Cro[facility]'s disease ([facility]     Diabetes ([facility] [facility]     Hypertension     On statin therapy 7/26/2017    Pleurisy 07/17/2019    Post-menopausal 7/26/2017      Past Surgical History:   Procedure Laterality Date    COLONOSCOPY N/A 05/01/2018    COLONOSCOPY performed by Cal Hyde MD at [facility] ENDOSCOPY    COLONOSCOPY N/A 10/05/2021    COLONOSCOPY performed by Madhavi Maradiaga MD at [address]  05/01/2018         COLONOSCOPY,DIAGNOSTIC  10/05/2021         COLONOSCOPY,DIAGNOSTIC  10/08/2021         HX BREAST BIOPSY Right     over 30 years  negative    HX GI      tumor from colon removed    HX ORTHOPAEDIC      right wrist    HX OTHER SURGICAL      Bowel Obstruction DC BREAST SURGERY PROCEDURE UNLISTED      cyst removed right    [place] COLONOSCOPY W/BIOPSY SINGLE/MULTIPLE  2014          Social History     Tobacco Use    Smoking status: Former     Packs/day: 1.00     Years: 25.00     Pack years: 25.00     Types: Cigarettes     Quit date:      Years since quittin.8    Smokeless tobacco: Never   Vaping Use    Vaping Use: Never used   Substance Use Topics    Alcohol use: Yes     Comment: 2 drinks per month     Drug use: No     Current Outpatient Medications   Medication Sig Dispense Refill    predniSONE (STERAPRED) 5 mg dose pack See administration instruction per 5mg dose pack 21 Tablet 0    amLODIPine (NORVASC) 5 mg tablet Take 1 Tablet by mouth daily. 90 Tablet PRN    hydroCHLOROthiazide (HYDRODIURIL) 25 mg tablet TAKE 1 TABLET BY MOUTH EVERY DAY 30 Tablet 11    nateglinide (STARLIX) 120 mg tablet TAKE 1 TABLET BY MOUTH THREE TIMES DAILY BEFORE MEALS 90 Tablet 5    metFORMIN ER (GLUCOPHAGE XR) 500 mg tablet TAKE 1 TABLET BY MOUTH TWICE A DAY WITH MEALS 180 Tablet 2    diclofenac EC (VOLTAREN) 75 mg EC tablet TAKE 1 TABLET TWICE A DAY WITH FOOD FOR PAIN AND INFLAMMATION BREAKFAST AND SUPPER 60 Tablet 10    levothyroxine (SYNTHROID) 50 mcg tablet TAKE 1 TABLET EVERY DAY 30 Tablet 11    fish oil-dha-epa 1,200-144-216 mg cap Take 1 Tablet by mouth daily. GUAIFENESIN/PSEUDOEPHEDRNE HCL (MUCINEX D PO) Take  by mouth as needed. MULTIVIT,TH IRON,OTHER MIN (COMPLETE MULTIVITAMIN PO) Take 1 Tab by mouth daily. Indications: Complete multivitamin Women's 50+      calcium-cholecalciferol, D3, (CALTRATE 600+D) tablet Take 1 Tab by mouth daily.        Allergies   Allergen Reactions    Lisinopril Cough     Family History   Problem Relation Age of Onset    Diabetes Mother     Heart Failure Mother     Cancer Father         stomach       Patient Active Problem List    Diagnosis    Stage 2 chronic kidney disease    PAF (paroxysmal atrial fibrillation) (HCC)     Self Limiting      Essential hypertension    Controlled type 2 diabetes mellitus with stage 2 chronic kidney disease, without long-term current use of insulin (HCC)    Mixed hyperlipidemia    Primary osteoarthritis involving multiple joints    Acquired hypothyroidism    Crohn disease ([address]    Presbyopia    Vitamin D deficiency    Hammer toe of second toe of left foot    Edema    Alcohol screening    Acute non-recurrent maxillary sinusitis    Acute bronchitis    Medicare annual wellness visit, subsequent    Colon cancer screening    Post-menopausal    SBO (small bowel obstruction) [facility])       Patient Care Team:  Eliazar Bumpers, MD as PCP - General (Internal Medicine Physician)  Eliazar Bumpers, MD as PCP - REHABILITATION [facility] Provider    Depression Risk Factor Screening:     3 most recent PHQ Screens 11/11/2022   Little interest or pleasure in doing things Not at all   Feeling down, depressed, irritable, or hopeless Not at all   Total Score PHQ 2 0     Alcohol Risk Factor Screening:   Do you average more than 1 drink per night or more than 7 drinks a week:  No    On any one occasion in the past three months have you have had more than 3 drinks containing alcohol:  No    Functional Ability and Level of Safety:     Fall Risk     Fall Risk Assessment, last 12 mths 11/11/2022   Able to walk? Yes   Fall in past 12 months? 0   Do you feel unsteady? 0   Are you worried about falling 0       Hearing Loss   mild    Activities of Daily Living   Self-care.    ADL Assessment 11/11/2022   Feeding yourself No Help Needed   Getting from bed to chair No Help Needed   Getting dressed No Help Needed   Bathing or showering No Help Needed   Walk across the room (includes cane/walker) No Help Needed   Using the telphone No Help Needed   Taking your medications No Help Needed   Preparing meals No Help Needed   Managing money (expenses/bills) No Help Needed   Moderately strenuous housework (laundry) No Help Needed   Shopping for personal items (toiletries/medicines) No Help Needed   Shopping for groceries No Help Needed   Driving No Help Needed   Climbing a flight of stairs No Help Needed   Getting to places beyond walking distances No Help Needed       Abuse Screen   Patient is not abused    Social History     Social History Narrative    Not on file       Review of Systems      ROS:    Constitutional: She denies fevers, weight loss, sweats. Eyes: No blurred or double vision. ENT: No difficulty with swallowing, taste, speech or smell. NECK: no stiffness swelling or lymph node enlargement  Respiratory: No cough wheezing or shortness of breath. .  Pleurisy with left-sided chest pain increases with deep breath without shortness of breath  Cardiovascular: Denies chest pain, palpitations, unexplained indigestion or syncope. Breast: She has noted no masses or lumps and no discharge or axillary swelling  Gastrointestinal:  No changes in bowel movements, no abdominal pain, no bloating. Genitourinary: No discharge or abnormal bleeding or pain  Extremities: No joint pain, stiffness or swelling. Neurological:  No numbness, tingling, burring paresthesias or loss of motor strength. No syncope, dizziness or frequent headache  Skin:  No recent rashes or mole changes. Psychiatric/Behavioral:  Negative for depression. The patient is not nervous/anxious.    HEMATOLOGIC: no easy bruising or bleeding gums  Endocrine: no sweats of urinary frequency or excessive thirst     Physical Examination     Evaluation of Cognitive Function:  Mood/affect:  happy  Appearance: age appropriate  Family member/caregiver input: None    Vitals:    11/11/22 1028   BP: (!) 152/100   Pulse: 97   Temp: 98.1 °F (36.7 °C)   TempSrc: Oral   SpO2: 95%   Weight: 188 lb (85.3 kg)   Height: 5' 7\" (1.702 m)   PainSc:  10 - Worst pain ever   PainLoc: Back        PHYSICAL EXAM:    General appearance - alert, well appearing, and in no distress  Mental status - alert, oriented to person, place, and time  HEENT:  Ears - bilateral TM's and external ear canals clear  Eyes - pupillary responses were normal.  Extraocular muscle function intact. Lids and conjunctiva not injected. Fundoscopic exam revealed sharp disc margins. eye movements intact  Pharynx- clear with teeth in good repair. No masses were noted  Neck - supple without thyromegaly or burit. No JVD noted  Lungs - clear to auscultation and percussion  Cardiac- normal rate, regular rhythm without murmurs. PMI not displaced. No gallop, rub or click  Breast: deferred to GYN  Abdomen - flat, soft, non-tender without palpable organomegaly or mass. No pulsatile mass was felt, and not bruit was heard. Bowel sounds were active   Female - deferred to GYN  Rectal - deferred to GYN  Extremities -  no clubbing cyanosis or edema  Lymphatics - no palpable lymphadenopathy, no hepatosplenomegaly  Peripheral vascular - Dorsalis pedis and posterior tibial pulses felt without difficulty  Skin - no rash or unusual mole change noted  Neurological - Cranial nerves II-XII grossly intact. Motor strength 5/5. DTR's 2+ and symmetric.   Station and gait normal  Back exam - full range of motion, no tenderness, palpable spasm or pain on motion  Musculoskeletal - no joint tenderness, deformity or swelling  Hematologic: no purpura, petechiae or bruising     Results for orders placed or performed in visit on 08/05/22   HEMOGLOBIN A1C WITH EAG   Result Value Ref Range    Hemoglobin A1c 6.5 (H) 4.0 - 5.6 %    Est. average glucose 140 mg/dL   CK   Result Value Ref Range     (H) 26 - 192 U/L   LIPID PANEL   Result Value Ref Range    Cholesterol, total 225 (H) <200 MG/DL    Triglyceride 314 (H) <150 MG/DL    HDL Cholesterol 50 MG/DL    LDL, calculated 112.2 (H) 0 - 100 MG/DL    VLDL, calculated 62.8 MG/DL    CHOL/HDL Ratio 4.5 0.0 - 5.0     METABOLIC PANEL, COMPREHENSIVE   Result Value Ref Range    Sodium 138 136 - 145 mmol/L    Potassium 4.2 3.5 - 5.1 mmol/L    Chloride 100 97 - 108 mmol/L    CO2 30 21 - 32 mmol/L    Anion gap 8 5 - 15 mmol/L    Glucose 152 (H) 65 - 100 mg/dL    BUN 20 6 - 20 MG/DL    Creatinine 0.82 0.55 - 1.02 MG/DL    BUN/Creatinine ratio 24 (H) 12 - 20      GFR est AA >60 >60 ml/min/1.73m2    GFR est non-AA >60 >60 ml/min/1.73m2    Calcium 9.8 8.5 - 10.1 MG/DL    Bilirubin, total 0.6 0.2 - 1.0 MG/DL    ALT (SGPT) 34 12 - 78 U/L    AST (SGOT) 21 15 - 37 U/L    Alk. phosphatase 78 45 - 117 U/L    Protein, total 7.4 6.4 - 8.2 g/dL    Albumin 3.9 3.5 - 5.0 g/dL    Globulin 3.5 2.0 - 4.0 g/dL    A-G Ratio 1.1 1.1 - 2.2         Advice/Referrals/Counseling   Education and counseling provided:  Are appropriate based on today's review and evaluation  End-of-Life planning (with patient's consent)  Pneumococcal Vaccine  Influenza Vaccine  Colorectal cancer screening tests      Assessment/Plan     ASSESSMENT:   1. Essential hypertension    2. Controlled type 2 diabetes mellitus with stage 2 chronic kidney disease, without long-term current use of insulin ([address]    3. Mixed hyperlipidemia    4. Primary osteoarthritis involving multiple joints    5. Stage 2 chronic kidney disease    6. PAF (paroxysmal atrial fibrillation) ([address]    7. Crohn's disease of both small and large intestine without complication ([address]    8. SBO (small bowel obstruction) ([address]    9. Acquired hypothyroidism    10. Vitamin D deficiency    11. Alcohol screening    12. Medicare annual wellness visit, subsequent    13. Pleurisy      Impression  1 hypertension that is not controlled so at this point increase Norvasc to 5 mg daily. 2.  Diabetes repeat status pending prior lab reviewed  3. Hyperlipidemia prior lab reviewed repeat status pending  4. DJD that is stable  5. CKD stage II repeat status pending next Umber 6 paroxysmal atrial fibrillation no recent symptoms  7. Crohn's disease that is stable  8. History of small bowel obstruction no recurrence  9. Hypothyroidism repeat status pending  10.   Vitamin D deficiency repeat status pending  11 annual alcohol screening is done at this point she drinks maybe 1 drink per month. We did spend 8 minutes discussing excessive alcohol intake in females who averaged more than 1 drink per day with increased risk of cardiovascular disease and increased risk of liver disease and other GI problems. 12.  Pleurisy recurrent at this point we will put her on prednisone Dosepak. She will notify me if this is not effective  Medicare subsequent annual wellness examination screening questionnaire is completed today. The results reviewed with her and her questions answered. Lifestyle recommendations modifications discussed and made. Follow-up with me again in 1 month for hypertension in 3 months for other medical problems. I will call with today's lab results. PLAN:  .  Orders Placed This Encounter    CBC WITH AUTOMATED DIFF    METABOLIC PANEL, COMPREHENSIVE    LIPID PANEL    CK    HEMOGLOBIN A1C WITH EAG    T4 (THYROXINE)    TSH 3RD GENERATION    URINALYSIS W/ REFLEX CULTURE    VITAMIN D, 25 HYDROXY    MICROALBUMIN, UR, RAND W/ MICROALB/CREAT RATIO    predniSONE (STERAPRED) 5 mg dose pack    amLODIPine (NORVASC) 5 mg tablet         ATTENTION:   This medical record was transcribed using an electronic medical records system. Although proofread, it may and can contain electronic and spelling errors. Other human spelling and other errors may be present. Corrections may be executed at a later time. Please feel free to contact us for any clarifications as needed. Johnny Aguirre MD       Recommended healthy diet low in carbohydrates, fats, sodium and cholesterol. Recommended regular cardiovascular exercise 3-6 times per week for 30-60 minutes daily.       Current Outpatient Medications   Medication Sig Dispense Refill    predniSONE (STERAPRED) 5 mg dose pack See administration instruction per 5mg dose pack 21 Tablet 0    amLODIPine (NORVASC) 5 mg tablet Take 1 Tablet by mouth daily. 90 Tablet PRN    hydroCHLOROthiazide (HYDRODIURIL) 25 mg tablet TAKE 1 TABLET BY MOUTH EVERY DAY 30 Tablet 11    nateglinide (STARLIX) 120 mg tablet TAKE 1 TABLET BY MOUTH THREE TIMES DAILY BEFORE MEALS 90 Tablet 5    metFORMIN ER (GLUCOPHAGE XR) 500 mg tablet TAKE 1 TABLET BY MOUTH TWICE A DAY WITH MEALS 180 Tablet 2    diclofenac EC (VOLTAREN) 75 mg EC tablet TAKE 1 TABLET TWICE A DAY WITH FOOD FOR PAIN AND INFLAMMATION BREAKFAST AND SUPPER 60 Tablet 10    levothyroxine (SYNTHROID) 50 mcg tablet TAKE 1 TABLET EVERY DAY 30 Tablet 11    fish oil-dha-epa 1,200-144-216 mg cap Take 1 Tablet by mouth daily. GUAIFENESIN/PSEUDOEPHEDRNE HCL (MUCINEX D PO) Take  by mouth as needed. MULTIVIT,TH IRON,OTHER MIN (COMPLETE MULTIVITAMIN PO) Take 1 Tab by mouth daily. Indications: Complete multivitamin Women's 50+      calcium-cholecalciferol, D3, (CALTRATE 600+D) tablet Take 1 Tab by mouth daily. No results found for any visits on 11/11/22. Verbal and written instructions (see AVS) provided. Patient expresses understanding of diagnosis and treatment plan.     Michael Mane MD

## 2022-11-11 NOTE — PATIENT INSTRUCTIONS

## 2022-11-12 LAB
25(OH)D3 SERPL-MCNC: 22 NG/ML (ref 30–100)
ALBUMIN SERPL-MCNC: 4.1 G/DL (ref 3.5–5)
ALBUMIN/GLOB SERPL: 1.2 {RATIO} (ref 1.1–2.2)
ALP SERPL-CCNC: 84 U/L (ref 45–117)
ALT SERPL-CCNC: 39 U/L (ref 12–78)
ANION GAP SERPL CALC-SCNC: 8 MMOL/L (ref 5–15)
AST SERPL-CCNC: 32 U/L (ref 15–37)
BASOPHILS # BLD: 0.1 K/UL (ref 0–0.1)
BASOPHILS NFR BLD: 1 % (ref 0–1)
BILIRUB SERPL-MCNC: 0.6 MG/DL (ref 0.2–1)
BUN SERPL-MCNC: 15 MG/DL (ref 6–20)
BUN/CREAT SERPL: 18 (ref 12–20)
CALCIUM SERPL-MCNC: 9.5 MG/DL (ref 8.5–10.1)
CHLORIDE SERPL-SCNC: 102 MMOL/L (ref 97–108)
CHOLEST SERPL-MCNC: 232 MG/DL
CK SERPL-CCNC: 517 U/L (ref 26–192)
CO2 SERPL-SCNC: 30 MMOL/L (ref 21–32)
CREAT SERPL-MCNC: 0.83 MG/DL (ref 0.55–1.02)
DIFFERENTIAL METHOD BLD: ABNORMAL
EOSINOPHIL # BLD: 1 K/UL (ref 0–0.4)
EOSINOPHIL NFR BLD: 11 % (ref 0–7)
ERYTHROCYTE [DISTWIDTH] IN BLOOD BY AUTOMATED COUNT: 13.3 % (ref 11.5–14.5)
EST. AVERAGE GLUCOSE BLD GHB EST-MCNC: 140 MG/DL
GLOBULIN SER CALC-MCNC: 3.4 G/DL (ref 2–4)
GLUCOSE SERPL-MCNC: 148 MG/DL (ref 65–100)
HBA1C MFR BLD: 6.5 % (ref 4–5.6)
HCT VFR BLD AUTO: 41.7 % (ref 35–47)
HDLC SERPL-MCNC: 68 MG/DL
HDLC SERPL: 3.4 {RATIO} (ref 0–5)
HGB BLD-MCNC: 13.4 G/DL (ref 11.5–16)
IMM GRANULOCYTES # BLD AUTO: 0 K/UL (ref 0–0.04)
IMM GRANULOCYTES NFR BLD AUTO: 0 % (ref 0–0.5)
LDLC SERPL CALC-MCNC: 101 MG/DL (ref 0–100)
LYMPHOCYTES # BLD: 3.4 K/UL (ref 0.8–3.5)
LYMPHOCYTES NFR BLD: 37 % (ref 12–49)
MCH RBC QN AUTO: 31.2 PG (ref 26–34)
MCHC RBC AUTO-ENTMCNC: 32.1 G/DL (ref 30–36.5)
MCV RBC AUTO: 97.2 FL (ref 80–99)
MONOCYTES # BLD: 0.7 K/UL (ref 0–1)
MONOCYTES NFR BLD: 7 % (ref 5–13)
NEUTS SEG # BLD: 4.2 K/UL (ref 1.8–8)
NEUTS SEG NFR BLD: 44 % (ref 32–75)
NRBC # BLD: 0.03 K/UL (ref 0–0.01)
NRBC BLD-RTO: 0.3 PER 100 WBC
PLATELET # BLD AUTO: 301 K/UL (ref 150–400)
PMV BLD AUTO: 9.6 FL (ref 8.9–12.9)
POTASSIUM SERPL-SCNC: 4.7 MMOL/L (ref 3.5–5.1)
PROT SERPL-MCNC: 7.5 G/DL (ref 6.4–8.2)
RBC # BLD AUTO: 4.29 M/UL (ref 3.8–5.2)
SODIUM SERPL-SCNC: 140 MMOL/L (ref 136–145)
T4 SERPL-MCNC: 13.6 UG/DL (ref 4.8–13.9)
TRIGL SERPL-MCNC: 315 MG/DL (ref ?–150)
TSH SERPL DL<=0.05 MIU/L-ACNC: 4.15 UIU/ML (ref 0.36–3.74)
VLDLC SERPL CALC-MCNC: 63 MG/DL
WBC # BLD AUTO: 9.4 K/UL (ref 3.6–11)

## 2022-11-15 LAB
APPEARANCE UR: CLEAR
BACTERIA URNS QL MICRO: NEGATIVE /HPF
BILIRUB UR QL: NEGATIVE
CAOX CRY URNS QL MICRO: ABNORMAL
COLOR UR: ABNORMAL
CREAT UR-MCNC: 180 MG/DL
EPITH CASTS URNS QL MICRO: ABNORMAL /LPF
GLUCOSE UR STRIP.AUTO-MCNC: NEGATIVE MG/DL
HGB UR QL STRIP: NEGATIVE
KETONES UR QL STRIP.AUTO: NEGATIVE MG/DL
LEUKOCYTE ESTERASE UR QL STRIP.AUTO: ABNORMAL
MICROALBUMIN UR-MCNC: 4.34 MG/DL
MICROALBUMIN/CREAT UR-RTO: 24 MG/G (ref 0–30)
NITRITE UR QL STRIP.AUTO: NEGATIVE
PH UR STRIP: 5.5 [PH] (ref 5–8)
PROT UR STRIP-MCNC: NEGATIVE MG/DL
RBC #/AREA URNS HPF: ABNORMAL /HPF (ref 0–5)
SP GR UR REFRACTOMETRY: 1.02 (ref 1–1.03)
UA: UC IF INDICATED,UAUC: ABNORMAL
UROBILINOGEN UR QL STRIP.AUTO: 0.2 EU/DL (ref 0.2–1)
WBC URNS QL MICRO: ABNORMAL /HPF (ref 0–4)

## 2022-11-16 NOTE — PROGRESS NOTES
Results have been reviewed and abnormal results noted. Please see documentation in new EMR. Please call the patient regarding her abnormal result.  Vitamin D is low so start 1000 units daily.  Blood sugar and triglycerides slightly up so watch diet.  Other labs are okay. Letter generated to patient regarding.

## 2022-12-11 NOTE — PROGRESS NOTES
Chief Complaint   Patient presents with    Hypertension     1 month f/up       SUBJECTIVE:    Marialuisa Medina is a 69 y.o. female who was recently seen at which time her blood pressure was not controlled so we increased her Norvasc from 5 mg daily to 10 mg daily which she has been taking without any difficulty. She also at that visit was noted to have a low vitamin D and thus started on 1000 units daily which she is taking. She currently denies any cardiorespiratory complaints. There are no GI or  complaints. She has no other complaints noted. Current Outpatient Medications   Medication Sig Dispense Refill    amLODIPine (NORVASC) 10 mg tablet Take 1 Tablet by mouth daily. 90 Tablet PRN    hydroCHLOROthiazide (HYDRODIURIL) 25 mg tablet TAKE 1 TABLET BY MOUTH EVERY DAY 30 Tablet 11    nateglinide (STARLIX) 120 mg tablet TAKE 1 TABLET BY MOUTH THREE TIMES DAILY BEFORE MEALS 90 Tablet 5    metFORMIN ER (GLUCOPHAGE XR) 500 mg tablet TAKE 1 TABLET BY MOUTH TWICE A DAY WITH MEALS 180 Tablet 2    diclofenac EC (VOLTAREN) 75 mg EC tablet TAKE 1 TABLET TWICE A DAY WITH FOOD FOR PAIN AND INFLAMMATION BREAKFAST AND SUPPER 60 Tablet 10    levothyroxine (SYNTHROID) 50 mcg tablet TAKE 1 TABLET EVERY DAY 30 Tablet 11    fish oil-dha-epa 1,200-144-216 mg cap Take 1 Tablet by mouth daily. GUAIFENESIN/PSEUDOEPHEDRNE HCL (MUCINEX D PO) Take  by mouth as needed. MULTIVIT,TH IRON,OTHER MIN (COMPLETE MULTIVITAMIN PO) Take 1 Tab by mouth daily. Indications: Complete multivitamin Women's 50+      calcium-cholecalciferol, D3, (CALTRATE 600+D) tablet Take 1 Tab by mouth daily.        Past Medical History:   Diagnosis Date    Acquired hypothyroidism 7/26/2017    CKD (chronic kidney disease) 7/26/2017    Crohn's disease ([address]     Diabetes ([address]     Hypertension     On statin therapy 7/26/2017    Pleurisy 07/17/2019    Post-menopausal 7/26/2017     Past Surgical History:   Procedure Laterality Date    COLONOSCOPY N/A 2018    COLONOSCOPY performed by Gely Luna MD at [facility] ENDOSCOPY    COLONOSCOPY N/A 10/05/2021    COLONOSCOPY performed by Torsten Leslie MD at [facility] ENDOSCOPY    COLONOSCOPY,DIAGNOSTIC  2018         COLONOSCOPY,DIAGNOSTIC  10/05/2021         COLONOSCOPY,DIAGNOSTIC  10/08/2021         HX BREAST BIOPSY Right     over 30 years  negative    HX GI      tumor from colon removed    HX ORTHOPAEDIC      right wrist    HX OTHER SURGICAL      Bowel Obstruction    [place] BREAST SURGERY PROCEDURE UNLISTED      cyst removed right    [place] COLONOSCOPY W/BIOPSY SINGLE/MULTIPLE  2014          Allergies   Allergen Reactions    Lisinopril Cough       REVIEW OF SYSTEMS:  General: negative for - chills or fever, or weight loss or gain  ENT: negative for - headaches, nasal congestion or tinnitus  Eyes: no blurred or visual changes  Neck: No stiffness or swollen nodes  Respiratory: negative for - cough, hemoptysis, shortness of breath or wheezing  Cardiovascular : negative for - chest pain, edema, palpitations or shortness of breath  Gastrointestinal: negative for - abdominal pain, blood in stools, heartburn or nausea/vomiting  Genito-Urinary: no dysuria, trouble voiding, or hematuria  Musculoskeletal: negative for - gait disturbance, joint pain, joint stiffness or joint swelling  Neurological: no TIA or stroke symptoms  Hematologic: no bruises, no bleeding  Lymphatic: no swollen glands  Integument: no lumps, mole changes, nail changes or rash  Endocrine:no malaise/lethargy poly uria or polydipsia or unexpected weight changes        Social History     Socioeconomic History    Marital status:    Tobacco Use    Smoking status: Former     Packs/day: 1.00     Years: 25.00     Pack years: 25.00     Types: Cigarettes     Quit date:      Years since quittin.9    Smokeless tobacco: Never   Vaping Use    Vaping Use: Never used   Substance and Sexual Activity    Alcohol use:  Yes Comment: 2 drinks per month     Drug use: No    Sexual activity: Never     Family History   Problem Relation Age of Onset    Diabetes Mother     Heart Failure Mother     Cancer Father         stomach       OBJECTIVE:     Visit Vitals  /72   Pulse 100   Temp 98.5 °F (36.9 °C)   Ht 5' 7\" (1.702 m)   Wt 187 lb 9.6 oz (85.1 kg)   SpO2 95%   BMI 29.38 kg/m²     CONSTITUTIONAL:   well nourished, appears age appropriate  EYES: sclera anicteric, PERRL, EOMI  ENMT:nares clear, moist mucous membranes, pharynx clear  NECK: supple. Thyroid normal, No JVD or bruits  RESPIRATORY: Chest: clear to ascultation and percussion, normal inspiratory effort  CARDIOVASCULAR: Heart: regular rate and rhythm no murmurs, rubs or gallops, PMI not displaced, No thrills, no peripheral edema  GASTROINTESTINAL: Abdomen: non distended, soft, non tender, bowel sounds normal  HEMATOLOGIC: no purpura, petechiae or bruising  LYMPHATIC: No lymph node enlargemant  MUSCULOSKELETAL: Extremities: no active synovitis, pulse 1+   INTEGUMENT: No unusual rashes or suspicious skin lesions noted. Nails appear normal.  PERIPHERAL VASCULAR: normal pulses femoral, PT and DP  NEUROLOGIC: non-focal exam, A & O X 3  PSYCHIATRIC:, appropriate affect     ASSESSMENT:   1. Essential hypertension    2. Vitamin D deficiency      Impression  1. Hypertension that is controlled now so continue current therapy  2. Vitamin D deficiency 9000 units daily  I will recheck her again myself    PLAN:  .  1      ATTENTION:   This medical record was transcribed using an electronic medical records system. Although proofread, it may and can contain electronic and spelling errors. Other human spelling and other errors may be present. Corrections may be executed at a later time. Please feel free to contact us for any clarifications as needed. No results found for any visits on 12/12/22.     Jose Faith MD    The patient verbalized understanding of the problems and plans as explained.

## 2022-12-12 ENCOUNTER — OFFICE VISIT (OUTPATIENT)
Dept: INTERNAL MEDICINE CLINIC | Age: 77
End: 2022-12-12
Payer: MEDICARE

## 2022-12-12 VITALS
WEIGHT: 187.6 LBS | HEIGHT: 67 IN | BODY MASS INDEX: 29.44 KG/M2 | OXYGEN SATURATION: 95 % | SYSTOLIC BLOOD PRESSURE: 124 MMHG | HEART RATE: 100 BPM | TEMPERATURE: 98.5 F | DIASTOLIC BLOOD PRESSURE: 72 MMHG

## 2022-12-12 DIAGNOSIS — E55.9 VITAMIN D DEFICIENCY: ICD-10-CM

## 2022-12-12 DIAGNOSIS — I10 ESSENTIAL HYPERTENSION: Primary | ICD-10-CM

## 2022-12-12 PROCEDURE — G8510 SCR DEP NEG, NO PLAN REQD: HCPCS | Performed by: INTERNAL MEDICINE

## 2022-12-12 PROCEDURE — G8536 NO DOC ELDER MAL SCRN: HCPCS | Performed by: INTERNAL MEDICINE

## 2022-12-12 PROCEDURE — G8754 DIAS BP LESS 90: HCPCS | Performed by: INTERNAL MEDICINE

## 2022-12-12 PROCEDURE — 1123F ACP DISCUSS/DSCN MKR DOCD: CPT | Performed by: INTERNAL MEDICINE

## 2022-12-12 PROCEDURE — 99213 OFFICE O/P EST LOW 20 MIN: CPT | Performed by: INTERNAL MEDICINE

## 2022-12-12 PROCEDURE — G8427 DOCREV CUR MEDS BY ELIG CLIN: HCPCS | Performed by: INTERNAL MEDICINE

## 2022-12-12 PROCEDURE — G8399 PT W/DXA RESULTS DOCUMENT: HCPCS | Performed by: INTERNAL MEDICINE

## 2022-12-12 PROCEDURE — 1101F PT FALLS ASSESS-DOCD LE1/YR: CPT | Performed by: INTERNAL MEDICINE

## 2022-12-12 PROCEDURE — G8752 SYS BP LESS 140: HCPCS | Performed by: INTERNAL MEDICINE

## 2022-12-12 PROCEDURE — 1090F PRES/ABSN URINE INCON ASSESS: CPT | Performed by: INTERNAL MEDICINE

## 2022-12-12 PROCEDURE — G8417 CALC BMI ABV UP PARAM F/U: HCPCS | Performed by: INTERNAL MEDICINE

## 2022-12-12 PROCEDURE — 3074F SYST BP LT 130 MM HG: CPT | Performed by: INTERNAL MEDICINE

## 2022-12-12 PROCEDURE — 3078F DIAST BP <80 MM HG: CPT | Performed by: INTERNAL MEDICINE

## 2022-12-12 RX ORDER — AMLODIPINE BESYLATE 10 MG/1
10 TABLET ORAL DAILY
Qty: 90 TABLET | OUTPATIENT
Start: 2022-12-12

## 2022-12-12 NOTE — PROGRESS NOTES
Chief Complaint   Patient presents with    Hypertension     1 month f/up      1. Have you been to the ER, urgent care clinic since your last visit? Hospitalized since your last visit? No    2. Have you seen or consulted any other health care providers outside of the [address] [place] [address] since your last visit? Include any pap smears or colon screening.  No

## 2022-12-26 DIAGNOSIS — I10 ESSENTIAL HYPERTENSION: ICD-10-CM

## 2022-12-27 ENCOUNTER — TELEPHONE (OUTPATIENT)
Dept: INTERNAL MEDICINE CLINIC | Age: 77
End: 2022-12-27

## 2022-12-27 RX ORDER — AMLODIPINE BESYLATE 2.5 MG/1
TABLET ORAL
Qty: 30 TABLET | Refills: 10 | Status: SHIPPED | OUTPATIENT
Start: 2022-12-27

## 2022-12-27 NOTE — TELEPHONE ENCOUNTER
Patient called and stated that her prescription for Amlodipine needs to be sent into the pharmacy for the new strength that she is taking. Stated that her script was previously increased to taking 5 mg once daily instead of 2.5. Patient advised it does appear a script was called in today however, she stated it should be for 5 mg instead of 2.5.

## 2022-12-28 NOTE — TELEPHONE ENCOUNTER
Dr Laurie Quintana    I see she has both amlodipine 10 mg  and 2.5 mg   not sure which is correct  in the notes from 12/12  it states it was changed to 10mg. Looks like both have been refilled    is this correct ?

## 2023-02-10 PROBLEM — Z13.820 OSTEOPOROSIS SCREENING: Status: ACTIVE | Noted: 2023-02-10

## 2023-02-10 NOTE — PROGRESS NOTES
Chief Complaint   Patient presents with    Follow-up     BP, diabetes, thyroid       SUBJECTIVE:    Barbara Henderson is a 69 y.o. female who returns today in follow-up of her medical problems include hypertension, diabetes, paroxysmal atrial fibrillation, hyperlipidemia, DJD, Crohn's disease and other multiple medical problems. She is taking her medication trying to follow her diet remains physically active. Currently she denies any chest pain, shortness of breath, palpitations, PND, orthopnea or other cardiac respiratory complaints. There are no current GI or  complaints. There are no headaches, dizziness or neurologic complaints. There are no current active arthritic complaints and no other complaints on complete review of systems. Current Outpatient Medications   Medication Sig Dispense Refill    amLODIPine (NORVASC) 2.5 mg tablet TAKE 1 TABLET BY MOUTH EVERY DAY (Patient taking differently: 5 mg.) 30 Tablet 10    hydroCHLOROthiazide (HYDRODIURIL) 25 mg tablet TAKE 1 TABLET BY MOUTH EVERY DAY 30 Tablet 11    nateglinide (STARLIX) 120 mg tablet TAKE 1 TABLET BY MOUTH THREE TIMES DAILY BEFORE MEALS 90 Tablet 5    metFORMIN ER (GLUCOPHAGE XR) 500 mg tablet TAKE 1 TABLET BY MOUTH TWICE A DAY WITH MEALS 180 Tablet 2    diclofenac EC (VOLTAREN) 75 mg EC tablet TAKE 1 TABLET TWICE A DAY WITH FOOD FOR PAIN AND INFLAMMATION BREAKFAST AND SUPPER 60 Tablet 10    levothyroxine (SYNTHROID) 50 mcg tablet TAKE 1 TABLET EVERY DAY 30 Tablet 11    fish oil-dha-epa 1,200-144-216 mg cap Take 1 Tablet by mouth daily. GUAIFENESIN/PSEUDOEPHEDRNE HCL (MUCINEX D PO) Take  by mouth as needed. MULTIVIT,TH IRON,OTHER MIN (COMPLETE MULTIVITAMIN PO) Take 1 Tab by mouth daily. Indications: Complete multivitamin Women's 50+      calcium-cholecalciferol, D3, (CALTRATE 600+D) tablet Take 1 Tab by mouth daily.        Past Medical History:   Diagnosis Date    Acquired hypothyroidism 7/26/2017    CKD (chronic kidney disease) 7/26/2017    Crohn's disease ([facility]     Diabetes ([facility]     Hypertension     On statin therapy 7/26/2017    Pleurisy 07/17/2019    Post-menopausal 7/26/2017     Past Surgical History:   Procedure Laterality Date    COLONOSCOPY N/A 05/01/2018    COLONOSCOPY performed by Dylan Rizvi MD at [facility] ENDOSCOPY    COLONOSCOPY N/A 10/05/2021    COLONOSCOPY performed by Stefany Aragon MD at [address]  05/01/2018         COLONOSCOPY,DIAGNOSTIC  10/05/2021         COLONOSCOPY,DIAGNOSTIC  10/08/2021         HX BREAST BIOPSY Right     over 30 years  negative    HX GI      tumor from colon removed    HX ORTHOPAEDIC      right wrist    HX OTHER SURGICAL      Bowel Obstruction    MT COLONOSCOPY W/BIOPSY SINGLE/MULTIPLE  04/09/2014         MT UNLISTED PROCEDURE BREAST      cyst removed right     Allergies   Allergen Reactions    Lisinopril Cough       REVIEW OF SYSTEMS:  General: negative for - chills or fever, or weight loss or gain  ENT: negative for - headaches, nasal congestion or tinnitus  Eyes: no blurred or visual changes  Neck: No stiffness or swollen nodes  Respiratory: negative for - cough, hemoptysis, shortness of breath or wheezing  Cardiovascular : negative for - chest pain, edema, palpitations or shortness of breath  Gastrointestinal: negative for - abdominal pain, blood in stools, heartburn or nausea/vomiting  Genito-Urinary: no dysuria, trouble voiding, or hematuria  Musculoskeletal: negative for - gait disturbance, joint pain, joint stiffness or joint swelling  Neurological: no TIA or stroke symptoms  Hematologic: no bruises, no bleeding  Lymphatic: no swollen glands  Integument: no lumps, mole changes, nail changes or rash  Endocrine:no malaise/lethargy poly uria or polydipsia or unexpected weight changes        Social History     Socioeconomic History    Marital status:    Tobacco Use    Smoking status: Former     Packs/day: 1.00     Years: 25.00     Pack years: 25.00     Types: Cigarettes     Quit date: 5     Years since quittin.1    Smokeless tobacco: Never   Vaping Use    Vaping Use: Never used   Substance and Sexual Activity    Alcohol use: Yes     Comment: 2 drinks per month     Drug use: No    Sexual activity: Never     Social Determinants of Health     Financial Resource Strain: Low Risk     Difficulty of Paying Living Expenses: Not hard at all   Food Insecurity: No Food Insecurity    Worried About Running Out of Food in the Last Year: Never true    Ran Out of Food in the Last Year: Never true     Family History   Problem Relation Age of Onset    Diabetes Mother     Heart Failure Mother     Cancer Father         stomach       OBJECTIVE:     Visit Vitals  /75 (BP 1 Location: Left upper arm, BP Patient Position: Sitting, BP Cuff Size: Large adult)   Pulse 92   Temp 98.1 °F (36.7 °C) (Oral)   Resp 16   Ht 5' 7\" (1.702 m)   Wt 187 lb (84.8 kg)   SpO2 98%   BMI 29.29 kg/m²     CONSTITUTIONAL:   well nourished, appears age appropriate  EYES: sclera anicteric, PERRL, EOMI  ENMT:nares clear, moist mucous membranes, pharynx clear  NECK: supple. Thyroid normal, No JVD or bruits  RESPIRATORY: Chest: clear to ascultation and percussion, normal inspiratory effort  CARDIOVASCULAR: Heart: regular rate and rhythm no murmurs, rubs or gallops, PMI not displaced, No thrills, no peripheral edema  GASTROINTESTINAL: Abdomen: non distended, soft, non tender, bowel sounds normal  HEMATOLOGIC: no purpura, petechiae or bruising  LYMPHATIC: No lymph node enlargemant  MUSCULOSKELETAL: Extremities: no active synovitis, pulse 1+. No diabetic foot changes noted. INTEGUMENT: No unusual rashes or suspicious skin lesions noted. Nails appear normal.  PERIPHERAL VASCULAR: normal pulses femoral, PT and DP  NEUROLOGIC: non-focal exam, A & O X 3. Normal distal sensation and proprioception all toes both feet. PSYCHIATRIC:, appropriate affect     ASSESSMENT:   1. Essential hypertension    2. Controlled type 2 diabetes mellitus with stage 2 chronic kidney disease, without long-term current use of insulin ([address]    3. Mixed hyperlipidemia    4. PAF (paroxysmal atrial fibrillation) ([address]    5. Primary osteoarthritis involving multiple joints    6. Crohn's disease of both small and large intestine without complication ([address]    7. SBO (small bowel obstruction) ([address]    8. Osteoporosis screening      Impression  1. Hypertension is controlled so continue current therapy reviewed with her  2. Diabetes repeat status pending prior lab reviewed  3. Hyperlipidemia prior lab reviewed repeat status pending  4 paroxysmal atrial fibrillation no recent symptoms  5 DJD stable next Umber 6 Crohn's disease stable next Umber 7 history of small bowel obstruction no recurrence  8. Need for bone density we will set that up  I will call with lab and follow stable continue same and follow-up in 3 months or sooner if there is a problem. PLAN:  .  Orders Placed This Encounter    DEXA BONE DENSITY STUDY AXIAL    METABOLIC PANEL, COMPREHENSIVE    LIPID PANEL    CK    HEMOGLOBIN A1C WITH EAG         ATTENTION:   This medical record was transcribed using an electronic medical records system. Although proofread, it may and can contain electronic and spelling errors. Other human spelling and other errors may be present. Corrections may be executed at a later time. Please feel free to contact us for any clarifications as needed. Follow-up and Dispositions    Return in about 3 months (around 5/13/2023). No results found for any visits on 02/13/23. Susan Cortez MD    The patient verbalized understanding of the problems and plans as explained.

## 2023-02-13 ENCOUNTER — OFFICE VISIT (OUTPATIENT)
Dept: INTERNAL MEDICINE CLINIC | Age: 78
End: 2023-02-13
Payer: MEDICARE

## 2023-02-13 VITALS
RESPIRATION RATE: 16 BRPM | SYSTOLIC BLOOD PRESSURE: 124 MMHG | OXYGEN SATURATION: 98 % | WEIGHT: 187 LBS | HEIGHT: 67 IN | HEART RATE: 92 BPM | BODY MASS INDEX: 29.35 KG/M2 | TEMPERATURE: 98.1 F | DIASTOLIC BLOOD PRESSURE: 75 MMHG

## 2023-02-13 DIAGNOSIS — Z13.820 OSTEOPOROSIS SCREENING: ICD-10-CM

## 2023-02-13 DIAGNOSIS — E11.22 CONTROLLED TYPE 2 DIABETES MELLITUS WITH STAGE 2 CHRONIC KIDNEY DISEASE, WITHOUT LONG-TERM CURRENT USE OF INSULIN (HCC): ICD-10-CM

## 2023-02-13 DIAGNOSIS — I48.0 PAF (PAROXYSMAL ATRIAL FIBRILLATION) (HCC): ICD-10-CM

## 2023-02-13 DIAGNOSIS — M15.9 PRIMARY OSTEOARTHRITIS INVOLVING MULTIPLE JOINTS: ICD-10-CM

## 2023-02-13 DIAGNOSIS — K56.609 SBO (SMALL BOWEL OBSTRUCTION) (HCC): ICD-10-CM

## 2023-02-13 DIAGNOSIS — E78.2 MIXED HYPERLIPIDEMIA: ICD-10-CM

## 2023-02-13 DIAGNOSIS — N18.2 CONTROLLED TYPE 2 DIABETES MELLITUS WITH STAGE 2 CHRONIC KIDNEY DISEASE, WITHOUT LONG-TERM CURRENT USE OF INSULIN (HCC): ICD-10-CM

## 2023-02-13 DIAGNOSIS — I10 ESSENTIAL HYPERTENSION: Primary | ICD-10-CM

## 2023-02-13 DIAGNOSIS — K50.80 CROHN'S DISEASE OF BOTH SMALL AND LARGE INTESTINE WITHOUT COMPLICATION (HCC): ICD-10-CM

## 2023-02-13 LAB
COMMENT, HOLDF: NORMAL
SAMPLES BEING HELD,HOLD: NORMAL

## 2023-02-13 PROCEDURE — G8417 CALC BMI ABV UP PARAM F/U: HCPCS | Performed by: INTERNAL MEDICINE

## 2023-02-13 PROCEDURE — 3074F SYST BP LT 130 MM HG: CPT | Performed by: INTERNAL MEDICINE

## 2023-02-13 PROCEDURE — 3078F DIAST BP <80 MM HG: CPT | Performed by: INTERNAL MEDICINE

## 2023-02-13 PROCEDURE — G8427 DOCREV CUR MEDS BY ELIG CLIN: HCPCS | Performed by: INTERNAL MEDICINE

## 2023-02-13 PROCEDURE — G8510 SCR DEP NEG, NO PLAN REQD: HCPCS | Performed by: INTERNAL MEDICINE

## 2023-02-13 PROCEDURE — 1101F PT FALLS ASSESS-DOCD LE1/YR: CPT | Performed by: INTERNAL MEDICINE

## 2023-02-13 PROCEDURE — 1090F PRES/ABSN URINE INCON ASSESS: CPT | Performed by: INTERNAL MEDICINE

## 2023-02-13 PROCEDURE — 1123F ACP DISCUSS/DSCN MKR DOCD: CPT | Performed by: INTERNAL MEDICINE

## 2023-02-13 PROCEDURE — G8536 NO DOC ELDER MAL SCRN: HCPCS | Performed by: INTERNAL MEDICINE

## 2023-02-13 PROCEDURE — G8399 PT W/DXA RESULTS DOCUMENT: HCPCS | Performed by: INTERNAL MEDICINE

## 2023-02-13 PROCEDURE — 99214 OFFICE O/P EST MOD 30 MIN: CPT | Performed by: INTERNAL MEDICINE

## 2023-02-13 NOTE — PROGRESS NOTES
Rm 1    Is fasting    Chief Complaint   Patient presents with    Follow-up     BP, diabetes, thyroid     1. Have you been to the ER, urgent care clinic since your last visit? Hospitalized since your last visit? No    2. Have you seen or consulted any other health care providers outside of the [address] [place] [address] since your last visit? Include any pap smears or colon screening.  No    Visit Vitals  /75 (BP 1 Location: Left upper arm, BP Patient Position: Sitting, BP Cuff Size: Large adult)   Pulse 92   Temp 98.1 °F (36.7 °C) (Oral)   Resp 16   Ht 5' 7\" (1.702 m)   Wt 187 lb (84.8 kg)   SpO2 98%   BMI 29.29 kg/m²

## 2023-02-14 LAB
ALBUMIN SERPL-MCNC: 3.9 G/DL (ref 3.5–5)
ALBUMIN/GLOB SERPL: 1.2 (ref 1.1–2.2)
ALP SERPL-CCNC: 78 U/L (ref 45–117)
ALT SERPL-CCNC: 29 U/L (ref 12–78)
ANION GAP SERPL CALC-SCNC: 8 MMOL/L (ref 5–15)
AST SERPL-CCNC: 24 U/L (ref 15–37)
BILIRUB SERPL-MCNC: 0.6 MG/DL (ref 0.2–1)
BUN SERPL-MCNC: 17 MG/DL (ref 6–20)
BUN/CREAT SERPL: 21 (ref 12–20)
CALCIUM SERPL-MCNC: 9.6 MG/DL (ref 8.5–10.1)
CHLORIDE SERPL-SCNC: 103 MMOL/L (ref 97–108)
CHOLEST SERPL-MCNC: 204 MG/DL
CK SERPL-CCNC: 235 U/L (ref 26–192)
CO2 SERPL-SCNC: 27 MMOL/L (ref 21–32)
CREAT SERPL-MCNC: 0.81 MG/DL (ref 0.55–1.02)
EST. AVERAGE GLUCOSE BLD GHB EST-MCNC: 134 MG/DL
GLOBULIN SER CALC-MCNC: 3.3 G/DL (ref 2–4)
GLUCOSE SERPL-MCNC: 153 MG/DL (ref 65–100)
HBA1C MFR BLD: 6.3 % (ref 4–5.6)
HDLC SERPL-MCNC: 64 MG/DL
HDLC SERPL: 3.2 (ref 0–5)
LDLC SERPL CALC-MCNC: 85.2 MG/DL (ref 0–100)
POTASSIUM SERPL-SCNC: 4.4 MMOL/L (ref 3.5–5.1)
PROT SERPL-MCNC: 7.2 G/DL (ref 6.4–8.2)
SODIUM SERPL-SCNC: 138 MMOL/L (ref 136–145)
TRIGL SERPL-MCNC: 274 MG/DL (ref ?–150)
VLDLC SERPL CALC-MCNC: 54.8 MG/DL

## 2023-03-01 DIAGNOSIS — I10 ESSENTIAL HYPERTENSION: ICD-10-CM

## 2023-03-01 RX ORDER — AMLODIPINE BESYLATE 5 MG/1
5 TABLET ORAL DAILY
Qty: 90 TABLET | Refills: 3 | Status: SHIPPED | OUTPATIENT
Start: 2023-03-01 | End: 2023-05-30

## 2023-03-01 NOTE — TELEPHONE ENCOUNTER
RX refill request from the patient/pharmacy. Patient last seen 02- with labs, and next appt. scheduled for 05-  Requested Prescriptions     Pending Prescriptions Disp Refills    amLODIPine (NORVASC) 5 mg tablet 90 Tablet 3     Sig: Take 1 Tablet by mouth daily for 90 days. Brian Carrillo

## 2023-03-12 PROBLEM — Z13.820 OSTEOPOROSIS SCREENING: Status: RESOLVED | Noted: 2023-02-10 | Resolved: 2023-03-12

## 2023-03-13 ENCOUNTER — OFFICE VISIT (OUTPATIENT)
Dept: INTERNAL MEDICINE CLINIC | Age: 78
End: 2023-03-13
Payer: MEDICARE

## 2023-03-13 VITALS
TEMPERATURE: 98.7 F | DIASTOLIC BLOOD PRESSURE: 92 MMHG | HEART RATE: 92 BPM | OXYGEN SATURATION: 98 % | BODY MASS INDEX: 29.96 KG/M2 | SYSTOLIC BLOOD PRESSURE: 148 MMHG | HEIGHT: 67 IN | WEIGHT: 190.9 LBS

## 2023-03-13 DIAGNOSIS — I10 ESSENTIAL HYPERTENSION: ICD-10-CM

## 2023-03-13 DIAGNOSIS — M54.2 NECK PAIN: Primary | ICD-10-CM

## 2023-03-13 PROCEDURE — 99214 OFFICE O/P EST MOD 30 MIN: CPT | Performed by: INTERNAL MEDICINE

## 2023-03-13 PROCEDURE — G8417 CALC BMI ABV UP PARAM F/U: HCPCS | Performed by: INTERNAL MEDICINE

## 2023-03-13 PROCEDURE — 1101F PT FALLS ASSESS-DOCD LE1/YR: CPT | Performed by: INTERNAL MEDICINE

## 2023-03-13 PROCEDURE — 3077F SYST BP >= 140 MM HG: CPT | Performed by: INTERNAL MEDICINE

## 2023-03-13 PROCEDURE — G8427 DOCREV CUR MEDS BY ELIG CLIN: HCPCS | Performed by: INTERNAL MEDICINE

## 2023-03-13 PROCEDURE — 3080F DIAST BP >= 90 MM HG: CPT | Performed by: INTERNAL MEDICINE

## 2023-03-13 PROCEDURE — 1090F PRES/ABSN URINE INCON ASSESS: CPT | Performed by: INTERNAL MEDICINE

## 2023-03-13 PROCEDURE — 1123F ACP DISCUSS/DSCN MKR DOCD: CPT | Performed by: INTERNAL MEDICINE

## 2023-03-13 PROCEDURE — G8510 SCR DEP NEG, NO PLAN REQD: HCPCS | Performed by: INTERNAL MEDICINE

## 2023-03-13 PROCEDURE — G8536 NO DOC ELDER MAL SCRN: HCPCS | Performed by: INTERNAL MEDICINE

## 2023-03-13 PROCEDURE — G8399 PT W/DXA RESULTS DOCUMENT: HCPCS | Performed by: INTERNAL MEDICINE

## 2023-03-13 RX ORDER — PREDNISONE 5 MG/1
TABLET ORAL
Qty: 48 TABLET | Refills: 0 | Status: SHIPPED | OUTPATIENT
Start: 2023-03-13

## 2023-03-13 RX ORDER — VALSARTAN 160 MG/1
160 TABLET ORAL DAILY
Qty: 30 TABLET | Refills: 5 | Status: SHIPPED | OUTPATIENT
Start: 2023-03-13

## 2023-03-13 NOTE — PROGRESS NOTES
Chief Complaint   Patient presents with    Neck Pain     Radiating to the back of her head and into her right arm      1. Have you been to the ER, urgent care clinic since your last visit? Hospitalized since your last visit? No    2. Have you seen or consulted any other health care providers outside of the [address] [place] [address] since your last visit? Include any pap smears or colon screening.  No

## 2023-05-10 RX ORDER — LEVOTHYROXINE SODIUM 0.05 MG/1
TABLET ORAL
Qty: 90 TABLET | Refills: 3 | Status: SHIPPED | OUTPATIENT
Start: 2023-05-10

## 2023-05-10 NOTE — TELEPHONE ENCOUNTER
RX refill request from the patient/pharmacy. Patient last seen 03- with labs, and next appt. scheduled for 05-  Requested Prescriptions     Pending Prescriptions Disp Refills    levothyroxine (SYNTHROID) 50 MCG tablet [Pharmacy Med Name: LEVOTHYROXIN 50MCG] 90 tablet 3     Sig: TAKE 1 TABLET BY MOUTH EVERY DAY    .

## 2023-05-16 RX ORDER — NATEGLINIDE 120 MG/1
TABLET ORAL
Qty: 270 TABLET | Refills: 3 | Status: SHIPPED | OUTPATIENT
Start: 2023-05-16

## 2023-05-16 RX ORDER — METFORMIN HYDROCHLORIDE 500 MG/1
TABLET, EXTENDED RELEASE ORAL
Qty: 180 TABLET | Refills: 3 | Status: SHIPPED | OUTPATIENT
Start: 2023-05-16

## 2023-05-16 RX ORDER — METFORMIN HYDROCHLORIDE 500 MG/1
500 TABLET, EXTENDED RELEASE ORAL 2 TIMES DAILY WITH MEALS
Qty: 180 TABLET | Refills: 3 | Status: SHIPPED | OUTPATIENT
Start: 2023-05-16

## 2023-05-16 NOTE — TELEPHONE ENCOUNTER
RX refill request from the patient/pharmacy. Patient last seen 03- with labs, and next appt. scheduled for 05-  Requested Prescriptions     Pending Prescriptions Disp Refills    nateglinide (STARLIX) 120 MG tablet [Pharmacy Med Name: *NATEGLINIDE 120MG] 270 tablet 3     Sig: TAKE 1 TABLET BY MOUTH THREE TIMES DAILY BEFORE MEALS    metFORMIN (GLUCOPHAGE-XR) 500 MG extended release tablet [Pharmacy Med Name: *METFORMIN 500MG ER] 180 tablet 3     Sig: TAKE 1 TABLET BY MOUTH TWICE A DAY WITH MEALS    .

## 2023-05-16 NOTE — TELEPHONE ENCOUNTER
RX refill request from the patient/pharmacy. Patient last seen 02- with labs, and next appt. scheduled for 05-  Requested Prescriptions     Pending Prescriptions Disp Refills    nateglinide (STARLIX) 120 MG tablet 270 tablet 3     Sig: TAKE 1 TABLET BY MOUTH THREE TIMES DAILY BEFORE MEALS    metFORMIN (GLUCOPHAGE-XR) 500 MG extended release tablet 180 tablet 3     Sig: Take 1 tablet by mouth 2 times daily (with meals)    .

## 2023-05-27 PROBLEM — E03.9 ACQUIRED HYPOTHYROIDISM: Status: ACTIVE | Noted: 2017-07-26

## 2023-05-27 PROBLEM — E55.9 VITAMIN D DEFICIENCY: Status: ACTIVE | Noted: 2022-11-10

## 2023-05-30 ENCOUNTER — OFFICE VISIT (OUTPATIENT)
Facility: CLINIC | Age: 78
End: 2023-05-30
Payer: MEDICARE

## 2023-05-30 VITALS
RESPIRATION RATE: 18 BRPM | DIASTOLIC BLOOD PRESSURE: 66 MMHG | OXYGEN SATURATION: 96 % | HEIGHT: 67 IN | TEMPERATURE: 98.2 F | BODY MASS INDEX: 29.07 KG/M2 | SYSTOLIC BLOOD PRESSURE: 126 MMHG | HEART RATE: 90 BPM | WEIGHT: 185.2 LBS

## 2023-05-30 DIAGNOSIS — E78.2 MIXED HYPERLIPIDEMIA: ICD-10-CM

## 2023-05-30 DIAGNOSIS — E11.22 CONTROLLED TYPE 2 DIABETES MELLITUS WITH STAGE 2 CHRONIC KIDNEY DISEASE, WITHOUT LONG-TERM CURRENT USE OF INSULIN (HCC): ICD-10-CM

## 2023-05-30 DIAGNOSIS — M15.9 PRIMARY OSTEOARTHRITIS INVOLVING MULTIPLE JOINTS: ICD-10-CM

## 2023-05-30 DIAGNOSIS — I48.0 PAF (PAROXYSMAL ATRIAL FIBRILLATION) (HCC): ICD-10-CM

## 2023-05-30 DIAGNOSIS — N18.2 STAGE 2 CHRONIC KIDNEY DISEASE: ICD-10-CM

## 2023-05-30 DIAGNOSIS — I10 ESSENTIAL HYPERTENSION: Primary | ICD-10-CM

## 2023-05-30 DIAGNOSIS — K56.609 SBO (SMALL BOWEL OBSTRUCTION) (HCC): ICD-10-CM

## 2023-05-30 DIAGNOSIS — E03.9 ACQUIRED HYPOTHYROIDISM: ICD-10-CM

## 2023-05-30 DIAGNOSIS — K50.90 CROHN'S DISEASE WITHOUT COMPLICATION, UNSPECIFIED GASTROINTESTINAL TRACT LOCATION (HCC): ICD-10-CM

## 2023-05-30 DIAGNOSIS — Z00.00 MEDICARE ANNUAL WELLNESS VISIT, SUBSEQUENT: ICD-10-CM

## 2023-05-30 DIAGNOSIS — N18.2 CONTROLLED TYPE 2 DIABETES MELLITUS WITH STAGE 2 CHRONIC KIDNEY DISEASE, WITHOUT LONG-TERM CURRENT USE OF INSULIN (HCC): ICD-10-CM

## 2023-05-30 DIAGNOSIS — E55.9 VITAMIN D DEFICIENCY: ICD-10-CM

## 2023-05-30 DIAGNOSIS — Z13.39 ALCOHOL SCREENING: ICD-10-CM

## 2023-05-30 LAB
25(OH)D3 SERPL-MCNC: 24.6 NG/ML (ref 30–100)
ALBUMIN SERPL-MCNC: 3.8 G/DL (ref 3.5–5)
ALBUMIN/GLOB SERPL: 1.2 (ref 1.1–2.2)
ALP SERPL-CCNC: 74 U/L (ref 45–117)
ALT SERPL-CCNC: 34 U/L (ref 12–78)
ANION GAP SERPL CALC-SCNC: 9 MMOL/L (ref 5–15)
AST SERPL-CCNC: 31 U/L (ref 15–37)
BASOPHILS # BLD: 0.1 K/UL (ref 0–0.1)
BASOPHILS NFR BLD: 1 % (ref 0–1)
BILIRUB SERPL-MCNC: 0.5 MG/DL (ref 0.2–1)
BUN SERPL-MCNC: 32 MG/DL (ref 6–20)
BUN/CREAT SERPL: 35 (ref 12–20)
CALCIUM SERPL-MCNC: 9.2 MG/DL (ref 8.5–10.1)
CHLORIDE SERPL-SCNC: 105 MMOL/L (ref 97–108)
CHOLEST SERPL-MCNC: 211 MG/DL
CK SERPL-CCNC: 300 U/L (ref 26–192)
CO2 SERPL-SCNC: 27 MMOL/L (ref 21–32)
COMMENT:: NORMAL
CREAT SERPL-MCNC: 0.92 MG/DL (ref 0.55–1.02)
DIFFERENTIAL METHOD BLD: ABNORMAL
EOSINOPHIL # BLD: 0.8 K/UL (ref 0–0.4)
EOSINOPHIL NFR BLD: 11 % (ref 0–7)
ERYTHROCYTE [DISTWIDTH] IN BLOOD BY AUTOMATED COUNT: 13.3 % (ref 11.5–14.5)
EST. AVERAGE GLUCOSE BLD GHB EST-MCNC: 126 MG/DL
GLOBULIN SER CALC-MCNC: 3.2 G/DL (ref 2–4)
GLUCOSE SERPL-MCNC: 141 MG/DL (ref 65–100)
HBA1C MFR BLD: 6 % (ref 4–5.6)
HCT VFR BLD AUTO: 37.2 % (ref 35–47)
HDLC SERPL-MCNC: 64 MG/DL
HDLC SERPL: 3.3 (ref 0–5)
HGB BLD-MCNC: 12 G/DL (ref 11.5–16)
IMM GRANULOCYTES # BLD AUTO: 0 K/UL (ref 0–0.04)
IMM GRANULOCYTES NFR BLD AUTO: 0 % (ref 0–0.5)
LDLC SERPL CALC-MCNC: 101.4 MG/DL (ref 0–100)
LYMPHOCYTES # BLD: 2.9 K/UL (ref 0.8–3.5)
LYMPHOCYTES NFR BLD: 38 % (ref 12–49)
MCH RBC QN AUTO: 32.1 PG (ref 26–34)
MCHC RBC AUTO-ENTMCNC: 32.3 G/DL (ref 30–36.5)
MCV RBC AUTO: 99.5 FL (ref 80–99)
MONOCYTES # BLD: 0.5 K/UL (ref 0–1)
MONOCYTES NFR BLD: 7 % (ref 5–13)
NEUTS SEG # BLD: 3.4 K/UL (ref 1.8–8)
NEUTS SEG NFR BLD: 43 % (ref 32–75)
NRBC # BLD: 0 K/UL (ref 0–0.01)
NRBC BLD-RTO: 0 PER 100 WBC
PLATELET # BLD AUTO: 262 K/UL (ref 150–400)
PMV BLD AUTO: 9.7 FL (ref 8.9–12.9)
POTASSIUM SERPL-SCNC: 5 MMOL/L (ref 3.5–5.1)
PROT SERPL-MCNC: 7 G/DL (ref 6.4–8.2)
RBC # BLD AUTO: 3.74 M/UL (ref 3.8–5.2)
RBC MORPH BLD: ABNORMAL
SODIUM SERPL-SCNC: 141 MMOL/L (ref 136–145)
SPECIMEN HOLD: NORMAL
T4 FREE SERPL-MCNC: 1.2 NG/DL (ref 0.8–1.5)
TRIGL SERPL-MCNC: 228 MG/DL
TSH SERPL DL<=0.05 MIU/L-ACNC: 2.44 UIU/ML (ref 0.36–3.74)
VLDLC SERPL CALC-MCNC: 45.6 MG/DL
WBC # BLD AUTO: 7.7 K/UL (ref 3.6–11)

## 2023-05-30 PROCEDURE — G8419 CALC BMI OUT NRM PARAM NOF/U: HCPCS | Performed by: INTERNAL MEDICINE

## 2023-05-30 PROCEDURE — G0439 PPPS, SUBSEQ VISIT: HCPCS | Performed by: INTERNAL MEDICINE

## 2023-05-30 PROCEDURE — 3044F HG A1C LEVEL LT 7.0%: CPT | Performed by: INTERNAL MEDICINE

## 2023-05-30 PROCEDURE — 1036F TOBACCO NON-USER: CPT | Performed by: INTERNAL MEDICINE

## 2023-05-30 PROCEDURE — 1090F PRES/ABSN URINE INCON ASSESS: CPT | Performed by: INTERNAL MEDICINE

## 2023-05-30 PROCEDURE — 1123F ACP DISCUSS/DSCN MKR DOCD: CPT | Performed by: INTERNAL MEDICINE

## 2023-05-30 PROCEDURE — 93000 ELECTROCARDIOGRAM COMPLETE: CPT | Performed by: INTERNAL MEDICINE

## 2023-05-30 PROCEDURE — G8400 PT W/DXA NO RESULTS DOC: HCPCS | Performed by: INTERNAL MEDICINE

## 2023-05-30 PROCEDURE — 99214 OFFICE O/P EST MOD 30 MIN: CPT | Performed by: INTERNAL MEDICINE

## 2023-05-30 PROCEDURE — 3078F DIAST BP <80 MM HG: CPT | Performed by: INTERNAL MEDICINE

## 2023-05-30 PROCEDURE — 3074F SYST BP LT 130 MM HG: CPT | Performed by: INTERNAL MEDICINE

## 2023-05-30 PROCEDURE — G8427 DOCREV CUR MEDS BY ELIG CLIN: HCPCS | Performed by: INTERNAL MEDICINE

## 2023-05-30 SDOH — ECONOMIC STABILITY: FOOD INSECURITY: WITHIN THE PAST 12 MONTHS, YOU WORRIED THAT YOUR FOOD WOULD RUN OUT BEFORE YOU GOT MONEY TO BUY MORE.: NEVER TRUE

## 2023-05-30 SDOH — ECONOMIC STABILITY: HOUSING INSECURITY
IN THE LAST 12 MONTHS, WAS THERE A TIME WHEN YOU DID NOT HAVE A STEADY PLACE TO SLEEP OR SLEPT IN A SHELTER (INCLUDING NOW)?: NO

## 2023-05-30 SDOH — ECONOMIC STABILITY: FOOD INSECURITY: WITHIN THE PAST 12 MONTHS, THE FOOD YOU BOUGHT JUST DIDN'T LAST AND YOU DIDN'T HAVE MONEY TO GET MORE.: NEVER TRUE

## 2023-05-30 SDOH — ECONOMIC STABILITY: INCOME INSECURITY: HOW HARD IS IT FOR YOU TO PAY FOR THE VERY BASICS LIKE FOOD, HOUSING, MEDICAL CARE, AND HEATING?: NOT HARD AT ALL

## 2023-05-30 ASSESSMENT — LIFESTYLE VARIABLES
HOW OFTEN DO YOU HAVE A DRINK CONTAINING ALCOHOL: MONTHLY OR LESS
HOW MANY STANDARD DRINKS CONTAINING ALCOHOL DO YOU HAVE ON A TYPICAL DAY: 1 OR 2

## 2023-05-30 ASSESSMENT — ANXIETY QUESTIONNAIRES
GAD7 TOTAL SCORE: 0
4. TROUBLE RELAXING: 0
3. WORRYING TOO MUCH ABOUT DIFFERENT THINGS: 0
2. NOT BEING ABLE TO STOP OR CONTROL WORRYING: 0
7. FEELING AFRAID AS IF SOMETHING AWFUL MIGHT HAPPEN: 0
1. FEELING NERVOUS, ANXIOUS, OR ON EDGE: 0
6. BECOMING EASILY ANNOYED OR IRRITABLE: 0
5. BEING SO RESTLESS THAT IT IS HARD TO SIT STILL: 0

## 2023-05-30 ASSESSMENT — PATIENT HEALTH QUESTIONNAIRE - PHQ9
SUM OF ALL RESPONSES TO PHQ9 QUESTIONS 1 & 2: 0
1. LITTLE INTEREST OR PLEASURE IN DOING THINGS: 0
SUM OF ALL RESPONSES TO PHQ QUESTIONS 1-9: 0
SUM OF ALL RESPONSES TO PHQ QUESTIONS 1-9: 0
2. FEELING DOWN, DEPRESSED OR HOPELESS: 0
SUM OF ALL RESPONSES TO PHQ QUESTIONS 1-9: 0
SUM OF ALL RESPONSES TO PHQ9 QUESTIONS 1 & 2: 0
2. FEELING DOWN, DEPRESSED OR HOPELESS: 0
1. LITTLE INTEREST OR PLEASURE IN DOING THINGS: 0
SUM OF ALL RESPONSES TO PHQ QUESTIONS 1-9: 0

## 2023-05-30 NOTE — PROGRESS NOTES
Sweetie Richard is a 69 y.o. female     Chief Complaint   Patient presents with    Medicare AWV       /66 (Site: Left Upper Arm, Position: Sitting, Cuff Size: Large Adult)   Pulse 90   Temp 98.2 °F (36.8 °C) (Oral)   Resp 18   Ht 5' 7\" (1.702 m)   Wt 185 lb 3.2 oz (84 kg)   SpO2 96%   BMI 29.01 kg/m²     Health Maintenance Due   Topic Date Due    DTaP/Tdap/Td vaccine (1 - Tdap) Never done    DEXA (modify frequency per FRAX score)  Never done    Annual Wellness Visit (AWV)  11/16/2022    COVID-19 Vaccine (5 - Booster for Moderna series) 12/22/2022         1. \"Have you been to the ER, urgent care clinic since your last visit? Hospitalized since your last visit? \" No    2. \"Have you seen or consulted any other health care providers outside of the [address] [place] [address] since your last visit? \" No     3. For patients aged 39-70: Has the patient had a colonoscopy / FIT/ Cologuard? N/A      If the patient is female:    4. For patients aged 41-77: Has the patient had a mammogram within the past 2 years? N/A      5. For patients aged 21-65: Has the patient had a pap smear?  N/A

## 2023-05-30 NOTE — PATIENT INSTRUCTIONS
breath.     Pain, pressure, or a strange feeling in the back, neck, jaw, or upper belly or in one or both shoulders or arms.     Lightheadedness or sudden weakness.     A fast or irregular heartbeat. After you call 911, the  may tell you to chew 1 adult-strength or 2 to 4 low-dose aspirin. Wait for an ambulance. Do not try to drive yourself. Watch closely for changes in your health, and be sure to contact your doctor if you have any problems. Where can you learn more? Go to http://www.rivera.com/ and enter F075 to learn more about \"A Healthy Heart: Care Instructions. \"  Current as of: September 7, 2022               Content Version: 13.6  © 2892-0629 [employer]wise, [employer]. Care instructions adapted under license by [facility] ([facility]). If you have questions about a medical condition or this instruction, always ask your healthcare professional. [place] [address] any warranty or liability for your use of this information. Personalized Preventive Plan for Kaila Flanagan - 5/30/2023  Medicare offers a range of preventive health benefits. Some of the tests and screenings are paid in full while other may be subject to a deductible, co-insurance, and/or copay. Some of these benefits include a comprehensive review of your medical history including lifestyle, illnesses that may run in your family, and various assessments and screenings as appropriate. After reviewing your medical record and screening and assessments performed today your provider may have ordered immunizations, labs, imaging, and/or referrals for you. A list of these orders (if applicable) as well as your Preventive Care list are included within your After Visit Summary for your review. Other Preventive Recommendations:    A preventive eye exam performed by an eye specialist is recommended every 1-2 years to screen for glaucoma; cataracts, macular degeneration, and other eye disorders.   A preventive

## 2023-06-01 LAB
APPEARANCE UR: CLEAR
BACTERIA URNS QL MICRO: NEGATIVE /HPF
BILIRUB UR QL: NEGATIVE
COLOR UR: NORMAL
CREAT UR-MCNC: 109 MG/DL
EPITH CASTS URNS QL MICRO: NORMAL /LPF
GLUCOSE UR STRIP.AUTO-MCNC: NEGATIVE MG/DL
HGB UR QL STRIP: NEGATIVE
HYALINE CASTS URNS QL MICRO: NORMAL /LPF (ref 0–5)
KETONES UR QL STRIP.AUTO: NEGATIVE MG/DL
LEUKOCYTE ESTERASE UR QL STRIP.AUTO: NEGATIVE
MICROALBUMIN UR-MCNC: 0.73 MG/DL
MICROALBUMIN/CREAT UR-RTO: 7 MG/G (ref 0–30)
NITRITE UR QL STRIP.AUTO: NEGATIVE
PH UR STRIP: 5 (ref 5–8)
PROT UR STRIP-MCNC: NEGATIVE MG/DL
RBC #/AREA URNS HPF: NORMAL /HPF (ref 0–5)
SP GR UR REFRACTOMETRY: 1.02 (ref 1–1.03)
UROBILINOGEN UR QL STRIP.AUTO: 0.2 EU/DL (ref 0.2–1)
WBC URNS QL MICRO: NORMAL /HPF (ref 0–4)

## 2023-07-10 RX ORDER — DICLOFENAC SODIUM 75 MG/1
TABLET, DELAYED RELEASE ORAL
Qty: 60 TABLET | Refills: 5 | Status: SHIPPED | OUTPATIENT
Start: 2023-07-10

## 2023-07-10 NOTE — TELEPHONE ENCOUNTER
RX refill request from the patient/pharmacy. Patient last seen 05- with labs, and next appt. scheduled for 09-  Requested Prescriptions     Pending Prescriptions Disp Refills    diclofenac (VOLTAREN) 75 MG EC tablet [Pharmacy Med Name: *DICLOFENAC 75MG DR] 60 tablet 5     Sig: TAKE 1 TABLET TWICE A DAY WITH FOOD FOR PAIN AND INFLAMMATION (WITH BREAKFAST AND SUPPER)    .

## 2023-09-05 NOTE — PROGRESS NOTES
Chief Complaint   Patient presents with    Hypertension     3 month follow-up        SUBJECTIVE:    Nicole Mckinnon is a 69 y.o. female who returns in follow-up for medical problems include hypertension, hyperlipidemia, paroxysmal atrial fibrillation, diabetes, CKD stage II, DJD and other medical problems. She is taking her medications trying to follow her diet remains physically active. Currently she denies any chest pain, shortness of breath or cardiac respiratory complaints. There are no GI or  complaints. She has no headaches, dizziness or neurologic complaints. There are no current active arthritic complaints and there are no other complaints on complete review of systems. Current Outpatient Medications   Medication Sig Dispense Refill    diclofenac (VOLTAREN) 75 MG EC tablet TAKE 1 TABLET TWICE A DAY WITH FOOD FOR PAIN AND INFLAMMATION (WITH BREAKFAST AND SUPPER) 60 tablet 5    nateglinide (STARLIX) 120 MG tablet TAKE 1 TABLET BY MOUTH THREE TIMES DAILY BEFORE MEALS 270 tablet 3    metFORMIN (GLUCOPHAGE-XR) 500 MG extended release tablet TAKE 1 TABLET BY MOUTH TWICE A DAY WITH MEALS 180 tablet 3    levothyroxine (SYNTHROID) 50 MCG tablet TAKE 1 TABLET BY MOUTH EVERY DAY 90 tablet 3    amLODIPine (NORVASC) 5 MG tablet Take 1 tablet by mouth daily      calcium carb-cholecalciferol 600-10 MG-MCG TABS per tab Take 1 tablet by mouth daily      hydroCHLOROthiazide (HYDRODIURIL) 25 MG tablet Take 1 tablet by mouth daily      valsartan (DIOVAN) 160 MG tablet Take 1 tablet by mouth daily      Glucosamine-Fish Oil-EPA--140-30-40 MG CAPS Take 1 tablet by mouth daily      Multiple Vitamins-Minerals (THERAPEUTIC MULTIVITAMIN-MINERALS W/ IRON) TABS tablet Take 1 tablet by mouth daily       No current facility-administered medications for this visit.      Past Medical History:   Diagnosis Date    Acquired hypothyroidism 7/26/2017    CKD (chronic kidney disease) 7/26/2017    Crohn's disease ([address])

## 2023-09-06 ENCOUNTER — OFFICE VISIT (OUTPATIENT)
Facility: CLINIC | Age: 78
End: 2023-09-06
Payer: MEDICARE

## 2023-09-06 VITALS
HEART RATE: 90 BPM | BODY MASS INDEX: 29.35 KG/M2 | SYSTOLIC BLOOD PRESSURE: 127 MMHG | WEIGHT: 187 LBS | OXYGEN SATURATION: 97 % | DIASTOLIC BLOOD PRESSURE: 77 MMHG | TEMPERATURE: 98.3 F | HEIGHT: 67 IN | RESPIRATION RATE: 16 BRPM

## 2023-09-06 DIAGNOSIS — N18.2 CONTROLLED TYPE 2 DIABETES MELLITUS WITH STAGE 2 CHRONIC KIDNEY DISEASE, WITHOUT LONG-TERM CURRENT USE OF INSULIN (HCC): ICD-10-CM

## 2023-09-06 DIAGNOSIS — E78.2 MIXED HYPERLIPIDEMIA: ICD-10-CM

## 2023-09-06 DIAGNOSIS — I10 ESSENTIAL HYPERTENSION: Primary | ICD-10-CM

## 2023-09-06 DIAGNOSIS — K50.90 CROHN'S DISEASE WITHOUT COMPLICATION, UNSPECIFIED GASTROINTESTINAL TRACT LOCATION (HCC): ICD-10-CM

## 2023-09-06 DIAGNOSIS — E11.22 CONTROLLED TYPE 2 DIABETES MELLITUS WITH STAGE 2 CHRONIC KIDNEY DISEASE, WITHOUT LONG-TERM CURRENT USE OF INSULIN (HCC): ICD-10-CM

## 2023-09-06 DIAGNOSIS — I48.0 PAF (PAROXYSMAL ATRIAL FIBRILLATION) (HCC): ICD-10-CM

## 2023-09-06 DIAGNOSIS — M15.9 PRIMARY OSTEOARTHRITIS INVOLVING MULTIPLE JOINTS: ICD-10-CM

## 2023-09-06 PROCEDURE — 3044F HG A1C LEVEL LT 7.0%: CPT | Performed by: INTERNAL MEDICINE

## 2023-09-06 PROCEDURE — 3078F DIAST BP <80 MM HG: CPT | Performed by: INTERNAL MEDICINE

## 2023-09-06 PROCEDURE — G8427 DOCREV CUR MEDS BY ELIG CLIN: HCPCS | Performed by: INTERNAL MEDICINE

## 2023-09-06 PROCEDURE — 1123F ACP DISCUSS/DSCN MKR DOCD: CPT | Performed by: INTERNAL MEDICINE

## 2023-09-06 PROCEDURE — G8399 PT W/DXA RESULTS DOCUMENT: HCPCS | Performed by: INTERNAL MEDICINE

## 2023-09-06 PROCEDURE — 99214 OFFICE O/P EST MOD 30 MIN: CPT | Performed by: INTERNAL MEDICINE

## 2023-09-06 PROCEDURE — 3074F SYST BP LT 130 MM HG: CPT | Performed by: INTERNAL MEDICINE

## 2023-09-06 PROCEDURE — G8419 CALC BMI OUT NRM PARAM NOF/U: HCPCS | Performed by: INTERNAL MEDICINE

## 2023-09-06 PROCEDURE — 1036F TOBACCO NON-USER: CPT | Performed by: INTERNAL MEDICINE

## 2023-09-06 PROCEDURE — 1090F PRES/ABSN URINE INCON ASSESS: CPT | Performed by: INTERNAL MEDICINE

## 2023-09-06 RX ORDER — MULTIVIT,THER.W-IRON,HEMATINIC 66.7-.33MG
1 TABLET ORAL DAILY
COMMUNITY

## 2023-09-06 SDOH — ECONOMIC STABILITY: INCOME INSECURITY: HOW HARD IS IT FOR YOU TO PAY FOR THE VERY BASICS LIKE FOOD, HOUSING, MEDICAL CARE, AND HEATING?: NOT HARD AT ALL

## 2023-09-06 SDOH — ECONOMIC STABILITY: FOOD INSECURITY: WITHIN THE PAST 12 MONTHS, THE FOOD YOU BOUGHT JUST DIDN'T LAST AND YOU DIDN'T HAVE MONEY TO GET MORE.: NEVER TRUE

## 2023-09-06 SDOH — ECONOMIC STABILITY: FOOD INSECURITY: WITHIN THE PAST 12 MONTHS, YOU WORRIED THAT YOUR FOOD WOULD RUN OUT BEFORE YOU GOT MONEY TO BUY MORE.: NEVER TRUE

## 2023-09-06 ASSESSMENT — PATIENT HEALTH QUESTIONNAIRE - PHQ9
1. LITTLE INTEREST OR PLEASURE IN DOING THINGS: 0
SUM OF ALL RESPONSES TO PHQ QUESTIONS 1-9: 0
SUM OF ALL RESPONSES TO PHQ9 QUESTIONS 1 & 2: 0
SUM OF ALL RESPONSES TO PHQ QUESTIONS 1-9: 0
SUM OF ALL RESPONSES TO PHQ QUESTIONS 1-9: 0
2. FEELING DOWN, DEPRESSED OR HOPELESS: 0
SUM OF ALL RESPONSES TO PHQ QUESTIONS 1-9: 0

## 2023-09-06 NOTE — PROGRESS NOTES
Chief Complaint   Patient presents with    Hypertension     3 month follow-up          Health Maintenance Due   Topic Date Due    DTaP/Tdap/Td vaccine (1 - Tdap) Never done    COVID-19 Vaccine (2 - Pfizer series) 12/12/2022    Flu vaccine (1) 08/01/2023           1. \"Have you been to the ER, urgent care clinic since your last visit? Hospitalized since your last visit? \" No    2. \"Have you seen or consulted any other health care providers outside of the [address] [place] [address] since your last visit? \" No     3. For patients aged 43-73: Has the patient had a colonoscopy / FIT/ Cologuard? NA - based on age      If the patient is female:    4. For patients aged 43-66: Has the patient had a mammogram within the past 2 years? NA - based on age or sex      11. For patients aged 21-65: Has the patient had a pap smear?  NA - based on age or sex

## 2023-09-07 LAB
ALBUMIN SERPL-MCNC: 4 G/DL (ref 3.5–5)
ALBUMIN/GLOB SERPL: 1.2 (ref 1.1–2.2)
ALP SERPL-CCNC: 72 U/L (ref 45–117)
ALT SERPL-CCNC: 34 U/L (ref 12–78)
ANION GAP SERPL CALC-SCNC: 5 MMOL/L (ref 5–15)
AST SERPL-CCNC: 23 U/L (ref 15–37)
BILIRUB SERPL-MCNC: 0.6 MG/DL (ref 0.2–1)
BUN SERPL-MCNC: 20 MG/DL (ref 6–20)
BUN/CREAT SERPL: 22 (ref 12–20)
CALCIUM SERPL-MCNC: 9.3 MG/DL (ref 8.5–10.1)
CHLORIDE SERPL-SCNC: 108 MMOL/L (ref 97–108)
CHOLEST SERPL-MCNC: 224 MG/DL
CK SERPL-CCNC: 275 U/L (ref 26–192)
CO2 SERPL-SCNC: 26 MMOL/L (ref 21–32)
CREAT SERPL-MCNC: 0.89 MG/DL (ref 0.55–1.02)
EST. AVERAGE GLUCOSE BLD GHB EST-MCNC: 140 MG/DL
GLOBULIN SER CALC-MCNC: 3.3 G/DL (ref 2–4)
GLUCOSE SERPL-MCNC: 130 MG/DL (ref 65–100)
HBA1C MFR BLD: 6.5 % (ref 4–5.6)
HDLC SERPL-MCNC: 66 MG/DL
HDLC SERPL: 3.4 (ref 0–5)
LDLC SERPL CALC-MCNC: 92 MG/DL (ref 0–100)
POTASSIUM SERPL-SCNC: 4.9 MMOL/L (ref 3.5–5.1)
PROT SERPL-MCNC: 7.3 G/DL (ref 6.4–8.2)
SODIUM SERPL-SCNC: 139 MMOL/L (ref 136–145)
TRIGL SERPL-MCNC: 330 MG/DL
VLDLC SERPL CALC-MCNC: 66 MG/DL

## 2023-09-07 RX ORDER — VALSARTAN 160 MG/1
TABLET ORAL
Qty: 30 TABLET | Refills: 4 | Status: SHIPPED | OUTPATIENT
Start: 2023-09-07

## 2023-09-07 NOTE — TELEPHONE ENCOUNTER
PCP: Stephany Berry MD    Last appt: 9/6/2023  Future Appointments   Date Time Provider [address]   12/5/2023  9:20 AM Roc Mckeon MD [facility]       Requested Prescriptions     Pending Prescriptions Disp Refills    valsartan (DIOVAN) 160 MG tablet [Pharmacy Med Name: *VALSARTAN 160MG] 30 tablet 4     Sig: TAKE 1 TABLET BY MOUTH DAILY FOR BLOOD PRESSURE       Prior labs and Blood pressures:  BP Readings from Last 3 Encounters:   09/06/23 127/77   05/30/23 126/66   03/13/23 (!) 148/92     Lab Results   Component Value Date/Time     09/06/2023 10:29 AM    K 4.9 09/06/2023 10:29 AM     09/06/2023 10:29 AM    CO2 26 09/06/2023 10:29 AM    BUN 20 09/06/2023 10:29 AM    GFRAA >60 08/05/2022 10:58 AM     Lab Results   Component Value Date/Time    FRX6BPDF 5.9 05/06/2021 10:29 AM     Lab Results   Component Value Date/Time    CHOL 224 09/06/2023 10:29 AM    HDL 66 09/06/2023 10:29 AM    VLDL 50 02/04/2021 10:54 AM     No results found for: VITD3, VD3RIA        Lab Results   Component Value Date/Time    TSH 4.15 11/11/2022 11:18 AM

## 2023-09-26 ENCOUNTER — OFFICE VISIT (OUTPATIENT)
Facility: CLINIC | Age: 78
End: 2023-09-26
Payer: MEDICARE

## 2023-09-26 VITALS
SYSTOLIC BLOOD PRESSURE: 130 MMHG | HEIGHT: 67 IN | DIASTOLIC BLOOD PRESSURE: 80 MMHG | RESPIRATION RATE: 19 BRPM | BODY MASS INDEX: 29.19 KG/M2 | OXYGEN SATURATION: 95 % | WEIGHT: 186 LBS | HEART RATE: 94 BPM | TEMPERATURE: 98.2 F

## 2023-09-26 DIAGNOSIS — J20.9 ACUTE BRONCHITIS, UNSPECIFIED ORGANISM: Primary | ICD-10-CM

## 2023-09-26 PROCEDURE — 1123F ACP DISCUSS/DSCN MKR DOCD: CPT | Performed by: INTERNAL MEDICINE

## 2023-09-26 PROCEDURE — G8419 CALC BMI OUT NRM PARAM NOF/U: HCPCS | Performed by: INTERNAL MEDICINE

## 2023-09-26 PROCEDURE — 3075F SYST BP GE 130 - 139MM HG: CPT | Performed by: INTERNAL MEDICINE

## 2023-09-26 PROCEDURE — G8427 DOCREV CUR MEDS BY ELIG CLIN: HCPCS | Performed by: INTERNAL MEDICINE

## 2023-09-26 PROCEDURE — 99213 OFFICE O/P EST LOW 20 MIN: CPT | Performed by: INTERNAL MEDICINE

## 2023-09-26 PROCEDURE — G8399 PT W/DXA RESULTS DOCUMENT: HCPCS | Performed by: INTERNAL MEDICINE

## 2023-09-26 PROCEDURE — 1036F TOBACCO NON-USER: CPT | Performed by: INTERNAL MEDICINE

## 2023-09-26 PROCEDURE — 3079F DIAST BP 80-89 MM HG: CPT | Performed by: INTERNAL MEDICINE

## 2023-09-26 PROCEDURE — 1090F PRES/ABSN URINE INCON ASSESS: CPT | Performed by: INTERNAL MEDICINE

## 2023-09-26 RX ORDER — CEFUROXIME AXETIL 500 MG/1
500 TABLET ORAL 2 TIMES DAILY
Qty: 20 TABLET | Refills: 0 | Status: SHIPPED | OUTPATIENT
Start: 2023-09-26 | End: 2023-10-06

## 2023-09-26 SDOH — ECONOMIC STABILITY: FOOD INSECURITY: WITHIN THE PAST 12 MONTHS, YOU WORRIED THAT YOUR FOOD WOULD RUN OUT BEFORE YOU GOT MONEY TO BUY MORE.: NEVER TRUE

## 2023-09-26 SDOH — ECONOMIC STABILITY: INCOME INSECURITY: HOW HARD IS IT FOR YOU TO PAY FOR THE VERY BASICS LIKE FOOD, HOUSING, MEDICAL CARE, AND HEATING?: NOT HARD AT ALL

## 2023-09-26 SDOH — ECONOMIC STABILITY: FOOD INSECURITY: WITHIN THE PAST 12 MONTHS, THE FOOD YOU BOUGHT JUST DIDN'T LAST AND YOU DIDN'T HAVE MONEY TO GET MORE.: NEVER TRUE

## 2023-09-26 ASSESSMENT — PATIENT HEALTH QUESTIONNAIRE - PHQ9
SUM OF ALL RESPONSES TO PHQ QUESTIONS 1-9: 0
SUM OF ALL RESPONSES TO PHQ9 QUESTIONS 1 & 2: 0
1. LITTLE INTEREST OR PLEASURE IN DOING THINGS: 0
2. FEELING DOWN, DEPRESSED OR HOPELESS: 0

## 2023-09-26 NOTE — PROGRESS NOTES
Chief Complaint   Patient presents with    Cough    Head Congestion    Nasal Congestion         Health Maintenance Due   Topic Date Due    DTaP/Tdap/Td vaccine (1 - Tdap) Never done    Hepatitis B vaccine (1 of 3 - Risk 3-dose series) Never done    COVID-19 Vaccine (5 - Moderna series) 12/22/2022    Flu vaccine (1) 08/01/2023           1. \"Have you been to the ER, urgent care clinic since your last visit? Hospitalized since your last visit? \" No    2. \"Have you seen or consulted any other health care providers outside of the [address] [place] [address] since your last visit? \" No     3. For patients aged 43-73: Has the patient had a colonoscopy / FIT/ Cologuard? NA - based on age      If the patient is female:    4. For patients aged 43-66: Has the patient had a mammogram within the past 2 years? NA - based on age or sex      11. For patients aged 21-65: Has the patient had a pap smear?  NA - based on age or sex
Additional pack years: 0.00     Total pack years: 10.00     Types: Cigarettes     Quit date: 1979     Years since quittin.7    Smokeless tobacco: Never   Substance and Sexual Activity    Alcohol use: Yes    Drug use: No    Sexual activity: Not on file   Other Topics Concern    Not on file   Social History Narrative    Not on file     Social Determinants of Health     Financial Resource Strain: Low Risk  (2023)    Overall Financial Resource Strain (CARDIA)     Difficulty of Paying Living Expenses: Not hard at all   Food Insecurity: No Food Insecurity (2023)    Hunger Vital Sign     Worried About Running Out of Food in the Last Year: Never true     [address] in the Last Year: Never true   Transportation Needs: Unknown (2023)    PRAPARE - Transportation     Lack of Transportation (Medical): Not on file     Lack of Transportation (Non-Medical): No   Physical Activity: Insufficiently Active (2023)    Exercise Vital Sign     Days of Exercise per Week: 2 days     Minutes of Exercise per Session: 60 min   Stress: Not on file   Social Connections: Not on file   Intimate Partner Violence: Not on file   Housing Stability: Unknown (2023)    Housing Stability Vital Sign     Unable to Pay for Housing in the Last Year: Not on file     Number of [address] in the Last Year: Not on file     Unstable Housing in the Last Year: No     Prior to Admission medications    Medication Sig Start Date End Date Taking?  Authorizing Provider   cefUROXime (CEFTIN) 500 MG tablet Take 1 tablet by mouth 2 times daily for 10 days 9/26/23 10/6/23 Yes Fish Wang MD   valsartan (DIOVAN) 160 MG tablet TAKE 1 TABLET BY MOUTH DAILY FOR BLOOD PRESSURE 23   Fish Wang MD   Glucosamine-Fish Oil-EPA--498-00-40 MG CAPS Take 1 tablet by mouth daily    Historical Provider, MD   Multiple Vitamins-Minerals (THERAPEUTIC MULTIVITAMIN-MINERALS W/ IRON) TABS tablet Take 1 tablet by mouth daily

## 2023-10-13 RX ORDER — HYDROCHLOROTHIAZIDE 25 MG/1
25 TABLET ORAL DAILY
Qty: 90 TABLET | Refills: 1 | Status: SHIPPED | OUTPATIENT
Start: 2023-10-13

## 2023-10-13 NOTE — TELEPHONE ENCOUNTER
RX refill request from the patient/pharmacy. Patient last seen 09- with labs, and next appt. scheduled for 12-  Requested Prescriptions     Pending Prescriptions Disp Refills    hydroCHLOROthiazide (HYDRODIURIL) 25 MG tablet [Pharmacy Med Name: HYDROCHLOROTHIAZIDE 25MG] 90 tablet 1     Sig: TAKE 1 TABLET EVERY DAY    .

## 2023-10-23 ENCOUNTER — TRANSCRIBE ORDERS (OUTPATIENT)
Facility: HOSPITAL | Age: 78
End: 2023-10-23

## 2023-10-23 DIAGNOSIS — Z12.31 VISIT FOR SCREENING MAMMOGRAM: Primary | ICD-10-CM

## 2023-11-06 ENCOUNTER — HOSPITAL ENCOUNTER (OUTPATIENT)
Facility: HOSPITAL | Age: 78
Discharge: HOME | End: 2023-11-09
Attending: INTERNAL MEDICINE
Payer: MEDICARE

## 2023-11-06 VITALS — BODY MASS INDEX: 29.19 KG/M2 | WEIGHT: 186 LBS | HEIGHT: 67 IN

## 2023-11-06 DIAGNOSIS — Z12.31 VISIT FOR SCREENING MAMMOGRAM: ICD-10-CM

## 2023-11-06 PROCEDURE — 77067 SCR MAMMO BI INCL CAD: CPT

## 2023-12-04 RX ORDER — AMLODIPINE BESYLATE 5 MG/1
5 TABLET ORAL DAILY
Qty: 90 TABLET | Refills: 3 | Status: SHIPPED | OUTPATIENT
Start: 2023-12-04

## 2023-12-04 NOTE — TELEPHONE ENCOUNTER
RX refill request from the patient/pharmacy. Patient last seen 09- with labs, and next appt. scheduled for 12-  Requested Prescriptions     Pending Prescriptions Disp Refills    amLODIPine (NORVASC) 5 MG tablet [Pharmacy Med Name: AMLODIPINE 5MG] 90 tablet 3     Sig: TAKE 1 TABLET EVERY DAY    .

## 2023-12-05 ENCOUNTER — OFFICE VISIT (OUTPATIENT)
Facility: CLINIC | Age: 78
End: 2023-12-05
Payer: MEDICARE

## 2023-12-05 VITALS
OXYGEN SATURATION: 96 % | HEIGHT: 67 IN | DIASTOLIC BLOOD PRESSURE: 74 MMHG | WEIGHT: 185.7 LBS | HEART RATE: 87 BPM | RESPIRATION RATE: 18 BRPM | TEMPERATURE: 97.8 F | SYSTOLIC BLOOD PRESSURE: 117 MMHG | BODY MASS INDEX: 29.15 KG/M2

## 2023-12-05 DIAGNOSIS — I10 ESSENTIAL HYPERTENSION: Primary | ICD-10-CM

## 2023-12-05 DIAGNOSIS — N18.2 CONTROLLED TYPE 2 DIABETES MELLITUS WITH STAGE 2 CHRONIC KIDNEY DISEASE, WITHOUT LONG-TERM CURRENT USE OF INSULIN (HCC): ICD-10-CM

## 2023-12-05 DIAGNOSIS — M15.9 PRIMARY OSTEOARTHRITIS INVOLVING MULTIPLE JOINTS: ICD-10-CM

## 2023-12-05 DIAGNOSIS — I48.0 PAF (PAROXYSMAL ATRIAL FIBRILLATION) (HCC): ICD-10-CM

## 2023-12-05 DIAGNOSIS — E78.2 MIXED HYPERLIPIDEMIA: ICD-10-CM

## 2023-12-05 DIAGNOSIS — E11.22 CONTROLLED TYPE 2 DIABETES MELLITUS WITH STAGE 2 CHRONIC KIDNEY DISEASE, WITHOUT LONG-TERM CURRENT USE OF INSULIN (HCC): ICD-10-CM

## 2023-12-05 LAB
ALBUMIN SERPL-MCNC: 3.8 G/DL (ref 3.5–5)
ALBUMIN/GLOB SERPL: 1.2 (ref 1.1–2.2)
ALP SERPL-CCNC: 66 U/L (ref 45–117)
ALT SERPL-CCNC: 33 U/L (ref 12–78)
ANION GAP SERPL CALC-SCNC: 7 MMOL/L (ref 5–15)
AST SERPL-CCNC: 27 U/L (ref 15–37)
BILIRUB SERPL-MCNC: 0.4 MG/DL (ref 0.2–1)
BUN SERPL-MCNC: 27 MG/DL (ref 6–20)
BUN/CREAT SERPL: 33 (ref 12–20)
CALCIUM SERPL-MCNC: 8.8 MG/DL (ref 8.5–10.1)
CHLORIDE SERPL-SCNC: 108 MMOL/L (ref 97–108)
CHOLEST SERPL-MCNC: 205 MG/DL
CK SERPL-CCNC: 341 U/L (ref 26–192)
CO2 SERPL-SCNC: 26 MMOL/L (ref 21–32)
CREAT SERPL-MCNC: 0.82 MG/DL (ref 0.55–1.02)
GLOBULIN SER CALC-MCNC: 3.2 G/DL (ref 2–4)
GLUCOSE SERPL-MCNC: 140 MG/DL (ref 65–100)
HDLC SERPL-MCNC: 61 MG/DL
HDLC SERPL: 3.4 (ref 0–5)
LDLC SERPL CALC-MCNC: 96.4 MG/DL (ref 0–100)
POTASSIUM SERPL-SCNC: 4.2 MMOL/L (ref 3.5–5.1)
PROT SERPL-MCNC: 7 G/DL (ref 6.4–8.2)
SODIUM SERPL-SCNC: 141 MMOL/L (ref 136–145)
TRIGL SERPL-MCNC: 238 MG/DL
VLDLC SERPL CALC-MCNC: 47.6 MG/DL

## 2023-12-05 PROCEDURE — 3044F HG A1C LEVEL LT 7.0%: CPT | Performed by: INTERNAL MEDICINE

## 2023-12-05 PROCEDURE — 99214 OFFICE O/P EST MOD 30 MIN: CPT | Performed by: INTERNAL MEDICINE

## 2023-12-05 PROCEDURE — 3074F SYST BP LT 130 MM HG: CPT | Performed by: INTERNAL MEDICINE

## 2023-12-05 PROCEDURE — 1036F TOBACCO NON-USER: CPT | Performed by: INTERNAL MEDICINE

## 2023-12-05 PROCEDURE — 1123F ACP DISCUSS/DSCN MKR DOCD: CPT | Performed by: INTERNAL MEDICINE

## 2023-12-05 PROCEDURE — 3078F DIAST BP <80 MM HG: CPT | Performed by: INTERNAL MEDICINE

## 2023-12-05 PROCEDURE — G8484 FLU IMMUNIZE NO ADMIN: HCPCS | Performed by: INTERNAL MEDICINE

## 2023-12-05 PROCEDURE — G8428 CUR MEDS NOT DOCUMENT: HCPCS | Performed by: INTERNAL MEDICINE

## 2023-12-05 PROCEDURE — G8419 CALC BMI OUT NRM PARAM NOF/U: HCPCS | Performed by: INTERNAL MEDICINE

## 2023-12-05 PROCEDURE — G0008 ADMIN INFLUENZA VIRUS VAC: HCPCS | Performed by: INTERNAL MEDICINE

## 2023-12-05 PROCEDURE — 90694 VACC AIIV4 NO PRSRV 0.5ML IM: CPT | Performed by: INTERNAL MEDICINE

## 2023-12-05 PROCEDURE — 1090F PRES/ABSN URINE INCON ASSESS: CPT | Performed by: INTERNAL MEDICINE

## 2023-12-05 PROCEDURE — G8399 PT W/DXA RESULTS DOCUMENT: HCPCS | Performed by: INTERNAL MEDICINE

## 2023-12-06 LAB
EST. AVERAGE GLUCOSE BLD GHB EST-MCNC: 120 MG/DL
HBA1C MFR BLD: 5.8 % (ref 4–5.6)

## 2024-02-05 RX ORDER — VALSARTAN 160 MG/1
TABLET ORAL
Qty: 30 TABLET | Refills: 3 | Status: SHIPPED | OUTPATIENT
Start: 2024-02-05

## 2024-02-05 NOTE — TELEPHONE ENCOUNTER
Chief Complaint   Patient presents with    Medication Refill       Requested Prescriptions     Pending Prescriptions Disp Refills    valsartan (DIOVAN) 160 MG tablet [Pharmacy Med Name: VALSARTAN 160MG] 30 tablet 3     Sig: TAKE 1 TABLET BY MOUTH DAILY FOR BLOOD PRESSURE       Allergies:  Allergies   Allergen Reactions    Lisinopril Cough       Last visit with clinic:  12/5/2023   Next visit with clinic: 3/5/2024     Last visit with this provider: 12/5/2023   Next Visit with this provider: 3/5/2024    Signed by Michoacano HARRINGTON  02/05/24  1:58 PM

## 2024-03-04 NOTE — PROGRESS NOTES
Chief Complaint   Patient presents with    3 Month Follow-Up       SUBJECTIVE:    Trish Forbes is a 78 y.o. female who returns in follow-up for medical problems include hypertension, hyperlipidemia, diabetes, DJD, and other multiple medical problems.  She is taking medication transformers diet remains physically active.  Currently she notes no chest pain, shortness of breath or cardiac respiratory complaints.  There are no GI or  complaints.  There are no headaches, dizziness or neurologic complaints.  There are no current active arthritic complaints and there are no other complaints on complete review systems.      Current Outpatient Medications   Medication Sig Dispense Refill    valsartan (DIOVAN) 160 MG tablet TAKE 1 TABLET BY MOUTH DAILY FOR BLOOD PRESSURE 30 tablet 3    hydrocortisone 2.5 % cream       nystatin (MYCOSTATIN) 815700 UNIT/GM cream       predniSONE 5 MG (21) TBPK Use as directed 1 each 0    Pseudoephedrine-guaiFENesin (MUCINEX D PO) Take by mouth as needed      amLODIPine (NORVASC) 5 MG tablet TAKE 1 TABLET EVERY DAY 90 tablet 3    hydroCHLOROthiazide (HYDRODIURIL) 25 MG tablet TAKE 1 TABLET EVERY DAY 90 tablet 1    Glucosamine-Fish Oil-EPA--989-29-40 MG CAPS Take 1 tablet by mouth daily      Multiple Vitamins-Minerals (THERAPEUTIC MULTIVITAMIN-MINERALS W/ IRON) TABS tablet Take 1 tablet by mouth daily      diclofenac (VOLTAREN) 75 MG EC tablet TAKE 1 TABLET TWICE A DAY WITH FOOD FOR PAIN AND INFLAMMATION (WITH BREAKFAST AND SUPPER) 60 tablet 5    nateglinide (STARLIX) 120 MG tablet TAKE 1 TABLET BY MOUTH THREE TIMES DAILY BEFORE MEALS 270 tablet 3    metFORMIN (GLUCOPHAGE-XR) 500 MG extended release tablet TAKE 1 TABLET BY MOUTH TWICE A DAY WITH MEALS 180 tablet 3    levothyroxine (SYNTHROID) 50 MCG tablet TAKE 1 TABLET BY MOUTH EVERY DAY 90 tablet 3    calcium carb-cholecalciferol 600-10 MG-MCG TABS per tab Take 1 tablet by mouth daily       No current facility-administered

## 2024-03-05 ENCOUNTER — OFFICE VISIT (OUTPATIENT)
Facility: CLINIC | Age: 79
End: 2024-03-05
Payer: MEDICARE

## 2024-03-05 VITALS
BODY MASS INDEX: 28.96 KG/M2 | HEART RATE: 95 BPM | RESPIRATION RATE: 18 BRPM | TEMPERATURE: 98.1 F | OXYGEN SATURATION: 92 % | SYSTOLIC BLOOD PRESSURE: 134 MMHG | DIASTOLIC BLOOD PRESSURE: 84 MMHG | WEIGHT: 184.5 LBS | HEIGHT: 67 IN

## 2024-03-05 DIAGNOSIS — E11.22 CONTROLLED TYPE 2 DIABETES MELLITUS WITH STAGE 2 CHRONIC KIDNEY DISEASE, WITHOUT LONG-TERM CURRENT USE OF INSULIN (HCC): ICD-10-CM

## 2024-03-05 DIAGNOSIS — N18.2 CONTROLLED TYPE 2 DIABETES MELLITUS WITH STAGE 2 CHRONIC KIDNEY DISEASE, WITHOUT LONG-TERM CURRENT USE OF INSULIN (HCC): ICD-10-CM

## 2024-03-05 DIAGNOSIS — I48.0 PAF (PAROXYSMAL ATRIAL FIBRILLATION) (HCC): ICD-10-CM

## 2024-03-05 DIAGNOSIS — I10 ESSENTIAL HYPERTENSION: Primary | ICD-10-CM

## 2024-03-05 DIAGNOSIS — M15.9 PRIMARY OSTEOARTHRITIS INVOLVING MULTIPLE JOINTS: ICD-10-CM

## 2024-03-05 DIAGNOSIS — E78.2 MIXED HYPERLIPIDEMIA: ICD-10-CM

## 2024-03-05 DIAGNOSIS — K50.90 CROHN'S DISEASE WITHOUT COMPLICATION, UNSPECIFIED GASTROINTESTINAL TRACT LOCATION (HCC): ICD-10-CM

## 2024-03-05 LAB
ALBUMIN SERPL-MCNC: 3.8 G/DL (ref 3.5–5)
ALBUMIN/GLOB SERPL: 1.1 (ref 1.1–2.2)
ALP SERPL-CCNC: 91 U/L (ref 45–117)
ALT SERPL-CCNC: 24 U/L (ref 12–78)
ANION GAP SERPL CALC-SCNC: 3 MMOL/L (ref 5–15)
AST SERPL-CCNC: 22 U/L (ref 15–37)
BILIRUB SERPL-MCNC: 0.4 MG/DL (ref 0.2–1)
BUN SERPL-MCNC: 29 MG/DL (ref 6–20)
BUN/CREAT SERPL: 30 (ref 12–20)
CALCIUM SERPL-MCNC: 9.4 MG/DL (ref 8.5–10.1)
CHLORIDE SERPL-SCNC: 107 MMOL/L (ref 97–108)
CHOLEST SERPL-MCNC: 193 MG/DL
CK SERPL-CCNC: 177 U/L (ref 26–192)
CO2 SERPL-SCNC: 26 MMOL/L (ref 21–32)
CREAT SERPL-MCNC: 0.96 MG/DL (ref 0.55–1.02)
EST. AVERAGE GLUCOSE BLD GHB EST-MCNC: 143 MG/DL
GLOBULIN SER CALC-MCNC: 3.5 G/DL (ref 2–4)
GLUCOSE SERPL-MCNC: 168 MG/DL (ref 65–100)
HBA1C MFR BLD: 6.6 % (ref 4–5.6)
HDLC SERPL-MCNC: 68 MG/DL
HDLC SERPL: 2.8 (ref 0–5)
LDLC SERPL CALC-MCNC: 77 MG/DL (ref 0–100)
POTASSIUM SERPL-SCNC: 4.2 MMOL/L (ref 3.5–5.1)
PROT SERPL-MCNC: 7.3 G/DL (ref 6.4–8.2)
SODIUM SERPL-SCNC: 136 MMOL/L (ref 136–145)
TRIGL SERPL-MCNC: 240 MG/DL
VLDLC SERPL CALC-MCNC: 48 MG/DL

## 2024-03-05 PROCEDURE — 3078F DIAST BP <80 MM HG: CPT | Performed by: INTERNAL MEDICINE

## 2024-03-05 PROCEDURE — 1090F PRES/ABSN URINE INCON ASSESS: CPT | Performed by: INTERNAL MEDICINE

## 2024-03-05 PROCEDURE — 1123F ACP DISCUSS/DSCN MKR DOCD: CPT | Performed by: INTERNAL MEDICINE

## 2024-03-05 PROCEDURE — 3075F SYST BP GE 130 - 139MM HG: CPT | Performed by: INTERNAL MEDICINE

## 2024-03-05 PROCEDURE — G8484 FLU IMMUNIZE NO ADMIN: HCPCS | Performed by: INTERNAL MEDICINE

## 2024-03-05 PROCEDURE — G8399 PT W/DXA RESULTS DOCUMENT: HCPCS | Performed by: INTERNAL MEDICINE

## 2024-03-05 PROCEDURE — 1036F TOBACCO NON-USER: CPT | Performed by: INTERNAL MEDICINE

## 2024-03-05 PROCEDURE — 99214 OFFICE O/P EST MOD 30 MIN: CPT | Performed by: INTERNAL MEDICINE

## 2024-03-05 PROCEDURE — G8427 DOCREV CUR MEDS BY ELIG CLIN: HCPCS | Performed by: INTERNAL MEDICINE

## 2024-03-05 PROCEDURE — G8419 CALC BMI OUT NRM PARAM NOF/U: HCPCS | Performed by: INTERNAL MEDICINE

## 2024-03-05 NOTE — PROGRESS NOTES
Trish Forbes is a 78 y.o. female     Chief Complaint   Patient presents with    3 Month Follow-Up       BP (!) 144/77 (Site: Right Upper Arm, Position: Sitting, Cuff Size: Medium Adult)   Pulse 95   Temp 98.1 °F (36.7 °C) (Oral)   Resp 18   Ht 1.702 m (5' 7\")   Wt 83.7 kg (184 lb 8 oz)   SpO2 92%   BMI 28.90 kg/m²     Health Maintenance Due   Topic Date Due    DTaP/Tdap/Td vaccine (1 - Tdap) Never done    Respiratory Syncytial Virus (RSV) Pregnant or age 60 yrs+ (1 - 1-dose 60+ series) Never done    COVID-19 Vaccine (6 - 2023-24 season) 09/01/2023         \"Have you been to the ER, urgent care clinic since your last visit?  Hospitalized since your last visit?\"    NO    “Have you seen or consulted any other health care providers outside of [facility] since your last visit?”    NO

## 2024-03-18 ENCOUNTER — OFFICE VISIT (OUTPATIENT)
Facility: CLINIC | Age: 79
End: 2024-03-18
Payer: MEDICARE

## 2024-03-18 VITALS
DIASTOLIC BLOOD PRESSURE: 71 MMHG | BODY MASS INDEX: 28.3 KG/M2 | OXYGEN SATURATION: 95 % | SYSTOLIC BLOOD PRESSURE: 123 MMHG | TEMPERATURE: 98.2 F | HEART RATE: 85 BPM | WEIGHT: 180.7 LBS

## 2024-03-18 DIAGNOSIS — R09.1 PLEURISY: ICD-10-CM

## 2024-03-18 DIAGNOSIS — R05.9 COUGH, UNSPECIFIED TYPE: Primary | ICD-10-CM

## 2024-03-18 PROCEDURE — G8419 CALC BMI OUT NRM PARAM NOF/U: HCPCS | Performed by: INTERNAL MEDICINE

## 2024-03-18 PROCEDURE — 3078F DIAST BP <80 MM HG: CPT | Performed by: INTERNAL MEDICINE

## 2024-03-18 PROCEDURE — 1123F ACP DISCUSS/DSCN MKR DOCD: CPT | Performed by: INTERNAL MEDICINE

## 2024-03-18 PROCEDURE — G8427 DOCREV CUR MEDS BY ELIG CLIN: HCPCS | Performed by: INTERNAL MEDICINE

## 2024-03-18 PROCEDURE — G8484 FLU IMMUNIZE NO ADMIN: HCPCS | Performed by: INTERNAL MEDICINE

## 2024-03-18 PROCEDURE — 1090F PRES/ABSN URINE INCON ASSESS: CPT | Performed by: INTERNAL MEDICINE

## 2024-03-18 PROCEDURE — G8399 PT W/DXA RESULTS DOCUMENT: HCPCS | Performed by: INTERNAL MEDICINE

## 2024-03-18 PROCEDURE — 3074F SYST BP LT 130 MM HG: CPT | Performed by: INTERNAL MEDICINE

## 2024-03-18 PROCEDURE — 99213 OFFICE O/P EST LOW 20 MIN: CPT | Performed by: INTERNAL MEDICINE

## 2024-03-18 PROCEDURE — 1036F TOBACCO NON-USER: CPT | Performed by: INTERNAL MEDICINE

## 2024-03-18 RX ORDER — PREDNISONE 10 MG/1
TABLET ORAL
Qty: 1 EACH | Refills: 0 | Status: SHIPPED | OUTPATIENT
Start: 2024-03-18

## 2024-03-18 RX ORDER — AZITHROMYCIN 250 MG/1
TABLET, FILM COATED ORAL
Qty: 6 TABLET | Refills: 0 | Status: SHIPPED | OUTPATIENT
Start: 2024-03-18 | End: 2024-03-28

## 2024-03-18 NOTE — PROGRESS NOTES
Chief Complaint   Patient presents with    Pleurisy    Other    Hypertension    Cholesterol Problem    Diabetes    1. Have you been to the ER, urgent care clinic since your last visit?  Hospitalized since your last visit?no    2. Have you seen or consulted any other health care providers outside of the [facility] since your last visit?  Include any pap smears or colon screening. no

## 2024-03-18 NOTE — PROGRESS NOTES
Subjective:   Trish Forbes is a 79 y.o. female      Chief Complaint   Patient presents with    Pleurisy    Other    Hypertension    Cholesterol Problem    Diabetes        History of present illness: She presents complaining of a cough that has been present now for few days but not getting sputum up.  She does note a pleuritic type chest pain.  She notes no shortness of breath.  She has had no fevers or chills.      Patient Active Problem List   Diagnosis    Alcohol screening    Post-menopausal    Neck pain    PAF (paroxysmal atrial fibrillation) ([facility])    Hammer toe of second toe of left foot    Primary osteoarthritis involving multiple joints    SBO (small bowel obstruction) ([facility])    Essential hypertension    Controlled type 2 diabetes mellitus with stage 2 chronic kidney disease, without long-term current use of insulin ([facility])    Stage 2 chronic kidney disease    Medicare annual wellness visit, subsequent    Acquired hypothyroidism    Crohn disease ([facility])    Acute bronchitis    Acute non-recurrent maxillary sinusitis    Mixed hyperlipidemia    Edema    Colon cancer screening    Vitamin D deficiency    Presbyopia      Past Medical History:   Diagnosis Date    Acquired hypothyroidism 7/26/2017    CKD (chronic kidney disease) 7/26/2017    Crohn's disease (HCC)     Diabetes ([facility])     GERD (gastroesophageal reflux disease) 12/1995    Hypertension     On statin therapy 7/26/2017    Pleurisy 07/17/2019    Post-menopausal 7/26/2017      Allergies   Allergen Reactions    Lisinopril Cough      Family History   Problem Relation Age of Onset    Diabetes Mother     Heart Failure Mother     Cancer Father         stomach      Social History     Socioeconomic History    Marital status:      Spouse name: Not on file    Number of children: Not on file    Years of education: Not on file    Highest education level: Not on file   Occupational History    Not on file   Tobacco Use    Smoking status: Former     Current

## 2024-04-10 RX ORDER — HYDROCHLOROTHIAZIDE 25 MG/1
25 TABLET ORAL DAILY
Qty: 90 TABLET | Refills: 1 | Status: SHIPPED | OUTPATIENT
Start: 2024-04-10

## 2024-04-10 NOTE — TELEPHONE ENCOUNTER
PCP: Umang Arteaga MD    Last appt: 3/18/2024  Future Appointments   Date Time Provider Department Center   6/10/2024  9:30 AM Umang Arteaga MD [facility]       Requested Prescriptions     Pending Prescriptions Disp Refills    hydroCHLOROthiazide (HYDRODIURIL) 25 MG tablet 90 tablet 1     Sig: Take 1 tablet by mouth daily       Prior labs and Blood pressures:  BP Readings from Last 3 Encounters:   03/18/24 123/71   03/05/24 134/84   12/21/23 (!) 145/84     Lab Results   Component Value Date/Time     03/05/2024 10:18 AM    K 4.2 03/05/2024 10:18 AM     03/05/2024 10:18 AM    CO2 26 03/05/2024 10:18 AM    BUN 29 03/05/2024 10:18 AM    GFRAA >60 08/05/2022 10:58 AM     Lab Results   Component Value Date/Time    KYH4DYPF 5.9 05/06/2021 10:29 AM     Lab Results   Component Value Date/Time    CHOL 193 03/05/2024 10:18 AM    HDL 68 03/05/2024 10:18 AM    VLDL 50 02/04/2021 10:54 AM     No results found for: \"VITD3\", \"VD3RIA\"        Lab Results   Component Value Date/Time    TSH 4.15 11/11/2022 11:18 AM

## 2024-05-06 RX ORDER — METFORMIN HYDROCHLORIDE 500 MG/1
500 TABLET, EXTENDED RELEASE ORAL 2 TIMES DAILY WITH MEALS
Qty: 180 TABLET | Refills: 2 | Status: SHIPPED | OUTPATIENT
Start: 2024-05-06

## 2024-05-06 NOTE — TELEPHONE ENCOUNTER
PCP: Umang Arteaga MD    Last appt: 3/18/2024  Future Appointments   Date Time Provider Department Center   6/10/2024  9:30 AM Umang Arteaga MD [facility]       Requested Prescriptions     Pending Prescriptions Disp Refills    metFORMIN (GLUCOPHAGE-XR) 500 MG extended release tablet [Pharmacy Med Name: METFORMIN 500MG ER] 180 tablet 2     Sig: TAKE 1 TABLET BY MOUTH 2 TIMES DAILY (WITH MEALS)       Prior labs and Blood pressures:  BP Readings from Last 3 Encounters:   03/18/24 123/71   03/05/24 134/84   12/21/23 (!) 145/84     Lab Results   Component Value Date/Time     03/05/2024 10:18 AM    K 4.2 03/05/2024 10:18 AM     03/05/2024 10:18 AM    CO2 26 03/05/2024 10:18 AM    BUN 29 03/05/2024 10:18 AM    GFRAA >60 08/05/2022 10:58 AM     Lab Results   Component Value Date/Time    QYK9ZQWY 5.9 05/06/2021 10:29 AM     Lab Results   Component Value Date/Time    CHOL 193 03/05/2024 10:18 AM    HDL 68 03/05/2024 10:18 AM    LDL 77 03/05/2024 10:18 AM    VLDL 48 03/05/2024 10:18 AM    VLDL 50 02/04/2021 10:54 AM     No results found for: \"VITD3\", \"VD3RIA\"        Lab Results   Component Value Date/Time    TSH 4.15 11/11/2022 11:18 AM

## 2024-05-11 ENCOUNTER — OFFICE VISIT (OUTPATIENT)
Age: 79
End: 2024-05-11

## 2024-05-11 VITALS
DIASTOLIC BLOOD PRESSURE: 85 MMHG | BODY MASS INDEX: 27.31 KG/M2 | WEIGHT: 174 LBS | HEART RATE: 128 BPM | OXYGEN SATURATION: 95 % | HEIGHT: 67 IN | RESPIRATION RATE: 16 BRPM | SYSTOLIC BLOOD PRESSURE: 145 MMHG | TEMPERATURE: 98.9 F

## 2024-05-11 DIAGNOSIS — M54.6 ACUTE LEFT-SIDED THORACIC BACK PAIN: Primary | ICD-10-CM

## 2024-05-11 DIAGNOSIS — R05.1 ACUTE COUGH: ICD-10-CM

## 2024-05-11 RX ORDER — AZITHROMYCIN 250 MG/1
TABLET, FILM COATED ORAL
Qty: 6 TABLET | Refills: 0 | Status: SHIPPED | OUTPATIENT
Start: 2024-05-11 | End: 2024-05-21

## 2024-05-11 RX ORDER — PREDNISONE 10 MG/1
10 TABLET ORAL DAILY
Qty: 1 EACH | Refills: 0 | Status: SHIPPED | OUTPATIENT
Start: 2024-05-11

## 2024-05-11 ASSESSMENT — ENCOUNTER SYMPTOMS
BACK PAIN: 1
COUGH: 1

## 2024-05-11 NOTE — PROGRESS NOTES
with taking a deep breath   Lymphadenopathy:      Cervical: No cervical adenopathy.   Skin:     General: Skin is warm.   Neurological:      Mental Status: She is alert and oriented to person, place, and time.   Psychiatric:         Behavior: Behavior normal.            Assessment & Plan  Trish was seen today for back pain.    Diagnoses and all orders for this visit:    Acute left-sided thoracic back pain  -     XR CHEST (2 VIEWS); Future  -     azithromycin (ZITHROMAX) 250 MG tablet; 500mg on day 1 followed by 250mg on days 2 - 5  -     predniSONE 10 MG (21) TBPK; Take 10 mg by mouth daily    Acute cough  -     XR CHEST (2 VIEWS); Future  -     azithromycin (ZITHROMAX) 250 MG tablet; 500mg on day 1 followed by 250mg on days 2 - 5  -     predniSONE 10 MG (21) TBPK; Take 10 mg by mouth daily        Reviewed with patient medication side effects in detail   I answered all patient questions and concerns  Labs and testing done and/or upcoming labs/test were reviewed and discussed   Reviewed and discussed daily activity, exercise and diet      Return for follow up if symptoms persist.     Cristina Dang, MERRICK - NP

## 2024-05-15 RX ORDER — LEVOTHYROXINE SODIUM 0.05 MG/1
TABLET ORAL
Qty: 90 TABLET | Refills: 3 | Status: SHIPPED | OUTPATIENT
Start: 2024-05-15

## 2024-05-15 NOTE — TELEPHONE ENCOUNTER
RX refill request from the patient/pharmacy. Patient last seen 03- with labs, and next appt. scheduled for 06-  Requested Prescriptions     Pending Prescriptions Disp Refills    levothyroxine (SYNTHROID) 50 MCG tablet [Pharmacy Med Name: LEVOTHYROXIN 50MCG] 90 tablet 3     Sig: TAKE 1 TABLET BY MOUTH EVERY DAY    .

## 2024-06-03 RX ORDER — VALSARTAN 160 MG/1
TABLET ORAL
Qty: 30 TABLET | Refills: 2 | Status: SHIPPED | OUTPATIENT
Start: 2024-06-03

## 2024-06-03 NOTE — TELEPHONE ENCOUNTER
PCP: Umang Arteaga MD    Last appt: 3/18/2024  Future Appointments   Date Time Provider Department Center   6/10/2024  9:30 AM Umang Arteaga MD [facility]       Requested Prescriptions     Pending Prescriptions Disp Refills    valsartan (DIOVAN) 160 MG tablet [Pharmacy Med Name: VALSARTAN 160MG] 30 tablet 2     Sig: TAKE 1 TABLET BY MOUTH DAILY FOR BLOOD PRESSURE       Prior labs and Blood pressures:  BP Readings from Last 3 Encounters:   05/11/24 (!) 145/85   03/18/24 123/71   03/05/24 134/84     Lab Results   Component Value Date/Time     03/05/2024 10:18 AM    K 4.2 03/05/2024 10:18 AM     03/05/2024 10:18 AM    CO2 26 03/05/2024 10:18 AM    BUN 29 03/05/2024 10:18 AM    GFRAA >60 08/05/2022 10:58 AM     Lab Results   Component Value Date/Time    QGU9SVWA 5.9 05/06/2021 10:29 AM     Lab Results   Component Value Date/Time    CHOL 193 03/05/2024 10:18 AM    HDL 68 03/05/2024 10:18 AM    LDL 77 03/05/2024 10:18 AM    VLDL 48 03/05/2024 10:18 AM    VLDL 50 02/04/2021 10:54 AM     No results found for: \"VITD3\"        Lab Results   Component Value Date/Time    TSH 4.15 11/11/2022 11:18 AM

## 2024-06-10 ENCOUNTER — OFFICE VISIT (OUTPATIENT)
Facility: CLINIC | Age: 79
End: 2024-06-10
Payer: MEDICARE

## 2024-06-10 VITALS
OXYGEN SATURATION: 98 % | RESPIRATION RATE: 17 BRPM | HEIGHT: 67 IN | WEIGHT: 176 LBS | HEART RATE: 83 BPM | DIASTOLIC BLOOD PRESSURE: 72 MMHG | TEMPERATURE: 98.7 F | SYSTOLIC BLOOD PRESSURE: 115 MMHG | BODY MASS INDEX: 27.62 KG/M2

## 2024-06-10 DIAGNOSIS — E11.22 CONTROLLED TYPE 2 DIABETES MELLITUS WITH STAGE 2 CHRONIC KIDNEY DISEASE, WITHOUT LONG-TERM CURRENT USE OF INSULIN (HCC): ICD-10-CM

## 2024-06-10 DIAGNOSIS — Z13.39 ALCOHOL SCREENING: ICD-10-CM

## 2024-06-10 DIAGNOSIS — I48.0 PAF (PAROXYSMAL ATRIAL FIBRILLATION) (HCC): ICD-10-CM

## 2024-06-10 DIAGNOSIS — E55.9 VITAMIN D DEFICIENCY: ICD-10-CM

## 2024-06-10 DIAGNOSIS — E03.9 ACQUIRED HYPOTHYROIDISM: ICD-10-CM

## 2024-06-10 DIAGNOSIS — E78.2 MIXED HYPERLIPIDEMIA: ICD-10-CM

## 2024-06-10 DIAGNOSIS — Z00.00 MEDICARE ANNUAL WELLNESS VISIT, SUBSEQUENT: ICD-10-CM

## 2024-06-10 DIAGNOSIS — N18.2 CONTROLLED TYPE 2 DIABETES MELLITUS WITH STAGE 2 CHRONIC KIDNEY DISEASE, WITHOUT LONG-TERM CURRENT USE OF INSULIN (HCC): ICD-10-CM

## 2024-06-10 DIAGNOSIS — I10 ESSENTIAL HYPERTENSION: Primary | ICD-10-CM

## 2024-06-10 DIAGNOSIS — M15.9 PRIMARY OSTEOARTHRITIS INVOLVING MULTIPLE JOINTS: ICD-10-CM

## 2024-06-10 DIAGNOSIS — K50.90 CROHN'S DISEASE WITHOUT COMPLICATION, UNSPECIFIED GASTROINTESTINAL TRACT LOCATION (HCC): ICD-10-CM

## 2024-06-10 LAB
25(OH)D3 SERPL-MCNC: 28.3 NG/ML (ref 30–100)
ALBUMIN SERPL-MCNC: 3.7 G/DL (ref 3.5–5)
ALBUMIN/GLOB SERPL: 1.2 (ref 1.1–2.2)
ALP SERPL-CCNC: 69 U/L (ref 45–117)
ALT SERPL-CCNC: 26 U/L (ref 12–78)
ANION GAP SERPL CALC-SCNC: 5 MMOL/L (ref 5–15)
AST SERPL-CCNC: 20 U/L (ref 15–37)
BASOPHILS # BLD: 0.1 K/UL (ref 0–0.1)
BASOPHILS NFR BLD: 1 % (ref 0–1)
BILIRUB SERPL-MCNC: 0.6 MG/DL (ref 0.2–1)
BUN SERPL-MCNC: 19 MG/DL (ref 6–20)
BUN/CREAT SERPL: 21 (ref 12–20)
CALCIUM SERPL-MCNC: 9.4 MG/DL (ref 8.5–10.1)
CHLORIDE SERPL-SCNC: 109 MMOL/L (ref 97–108)
CHOLEST SERPL-MCNC: 187 MG/DL
CK SERPL-CCNC: 132 U/L (ref 26–192)
CO2 SERPL-SCNC: 26 MMOL/L (ref 21–32)
CREAT SERPL-MCNC: 0.91 MG/DL (ref 0.55–1.02)
DIFFERENTIAL METHOD BLD: ABNORMAL
EOSINOPHIL # BLD: 0.7 K/UL (ref 0–0.4)
EOSINOPHIL NFR BLD: 9 % (ref 0–7)
ERYTHROCYTE [DISTWIDTH] IN BLOOD BY AUTOMATED COUNT: 14.1 % (ref 11.5–14.5)
EST. AVERAGE GLUCOSE BLD GHB EST-MCNC: 126 MG/DL
GLOBULIN SER CALC-MCNC: 3.1 G/DL (ref 2–4)
GLUCOSE SERPL-MCNC: 132 MG/DL (ref 65–100)
HBA1C MFR BLD: 6 % (ref 4–5.6)
HCT VFR BLD AUTO: 34.2 % (ref 35–47)
HDLC SERPL-MCNC: 64 MG/DL
HDLC SERPL: 2.9 (ref 0–5)
HGB BLD-MCNC: 11.5 G/DL (ref 11.5–16)
IMM GRANULOCYTES # BLD AUTO: 0 K/UL (ref 0–0.04)
IMM GRANULOCYTES NFR BLD AUTO: 0 % (ref 0–0.5)
LDLC SERPL CALC-MCNC: 77 MG/DL (ref 0–100)
LYMPHOCYTES # BLD: 3.1 K/UL (ref 0.8–3.5)
LYMPHOCYTES NFR BLD: 42 % (ref 12–49)
MCH RBC QN AUTO: 32.6 PG (ref 26–34)
MCHC RBC AUTO-ENTMCNC: 33.6 G/DL (ref 30–36.5)
MCV RBC AUTO: 96.9 FL (ref 80–99)
MONOCYTES # BLD: 0.5 K/UL (ref 0–1)
MONOCYTES NFR BLD: 7 % (ref 5–13)
NEUTS SEG # BLD: 3 K/UL (ref 1.8–8)
NEUTS SEG NFR BLD: 41 % (ref 32–75)
NRBC # BLD: 0 K/UL (ref 0–0.01)
NRBC BLD-RTO: 0 PER 100 WBC
PLATELET # BLD AUTO: 324 K/UL (ref 150–400)
PMV BLD AUTO: 9.2 FL (ref 8.9–12.9)
POTASSIUM SERPL-SCNC: 4.4 MMOL/L (ref 3.5–5.1)
PROT SERPL-MCNC: 6.8 G/DL (ref 6.4–8.2)
RBC # BLD AUTO: 3.53 M/UL (ref 3.8–5.2)
SODIUM SERPL-SCNC: 140 MMOL/L (ref 136–145)
T4 FREE SERPL-MCNC: 1.2 NG/DL (ref 0.8–1.5)
TRIGL SERPL-MCNC: 230 MG/DL
TSH SERPL DL<=0.05 MIU/L-ACNC: 2.33 UIU/ML (ref 0.36–3.74)
VLDLC SERPL CALC-MCNC: 46 MG/DL
WBC # BLD AUTO: 7.3 K/UL (ref 3.6–11)

## 2024-06-10 PROCEDURE — 3044F HG A1C LEVEL LT 7.0%: CPT | Performed by: INTERNAL MEDICINE

## 2024-06-10 PROCEDURE — G0442 ANNUAL ALCOHOL SCREEN 15 MIN: HCPCS | Performed by: INTERNAL MEDICINE

## 2024-06-10 PROCEDURE — 1123F ACP DISCUSS/DSCN MKR DOCD: CPT | Performed by: INTERNAL MEDICINE

## 2024-06-10 PROCEDURE — 1036F TOBACCO NON-USER: CPT | Performed by: INTERNAL MEDICINE

## 2024-06-10 PROCEDURE — 99214 OFFICE O/P EST MOD 30 MIN: CPT | Performed by: INTERNAL MEDICINE

## 2024-06-10 PROCEDURE — 3078F DIAST BP <80 MM HG: CPT | Performed by: INTERNAL MEDICINE

## 2024-06-10 PROCEDURE — G0403 EKG FOR INITIAL PREVENT EXAM: HCPCS | Performed by: INTERNAL MEDICINE

## 2024-06-10 PROCEDURE — 3074F SYST BP LT 130 MM HG: CPT | Performed by: INTERNAL MEDICINE

## 2024-06-10 PROCEDURE — 1090F PRES/ABSN URINE INCON ASSESS: CPT | Performed by: INTERNAL MEDICINE

## 2024-06-10 PROCEDURE — G8427 DOCREV CUR MEDS BY ELIG CLIN: HCPCS | Performed by: INTERNAL MEDICINE

## 2024-06-10 PROCEDURE — G0439 PPPS, SUBSEQ VISIT: HCPCS | Performed by: INTERNAL MEDICINE

## 2024-06-10 PROCEDURE — G8419 CALC BMI OUT NRM PARAM NOF/U: HCPCS | Performed by: INTERNAL MEDICINE

## 2024-06-10 PROCEDURE — G8399 PT W/DXA RESULTS DOCUMENT: HCPCS | Performed by: INTERNAL MEDICINE

## 2024-06-10 ASSESSMENT — PATIENT HEALTH QUESTIONNAIRE - PHQ9
SUM OF ALL RESPONSES TO PHQ QUESTIONS 1-9: 0
1. LITTLE INTEREST OR PLEASURE IN DOING THINGS: NOT AT ALL
2. FEELING DOWN, DEPRESSED OR HOPELESS: NOT AT ALL
SUM OF ALL RESPONSES TO PHQ QUESTIONS 1-9: 0
SUM OF ALL RESPONSES TO PHQ9 QUESTIONS 1 & 2: 0

## 2024-06-10 ASSESSMENT — LIFESTYLE VARIABLES
HOW MANY STANDARD DRINKS CONTAINING ALCOHOL DO YOU HAVE ON A TYPICAL DAY: PATIENT DOES NOT DRINK
HOW OFTEN DO YOU HAVE A DRINK CONTAINING ALCOHOL: MONTHLY OR LESS

## 2024-06-10 NOTE — PROGRESS NOTES
Trish Forbes is a 79 y.o. female     Chief Complaint   Patient presents with    Medicare AWV       /72 (Site: Left Upper Arm, Position: Sitting, Cuff Size: Medium Adult)   Pulse 83   Temp 98.7 °F (37.1 °C) (Temporal)   Resp 17   Ht 1.702 m (5' 7\")   Wt 79.8 kg (176 lb)   SpO2 98%   BMI 27.57 kg/m²     Health Maintenance Due   Topic Date Due    DTaP/Tdap/Td vaccine (1 - Tdap) Never done    Respiratory Syncytial Virus (RSV) Pregnant or age 60 yrs+ (1 - 1-dose 60+ series) Never done    COVID-19 Vaccine (6 - 2023-24 season) 09/01/2023    Annual Wellness Visit (Medicare)  05/30/2024         \"Have you been to the ER, urgent care clinic since your last visit?  Hospitalized since your last visit?\"    Urgent care 4/2024    “Have you seen or consulted any other health care providers outside of [facility] since your last visit?”    NO

## 2024-06-10 NOTE — PROGRESS NOTES
Medicare Annual Wellness Visit    Trish Forbes is here for Medicare AWV    Assessment & Plan   Essential hypertension  -     Urinalysis with Microscopic; Future  -     TSH; Future  -     T4, Free; Future  -     Comprehensive Metabolic Panel; Future  -     CBC with Auto Differential; Future  Controlled type 2 diabetes mellitus with stage 2 chronic kidney disease, without long-term current use of insulin (HCC)  -     Hemoglobin A1C; Future  -     Microalbumin / Creatinine Urine Ratio; Future  Mixed hyperlipidemia  -     Lipid Panel; Future  -     CK; Future  PAF (paroxysmal atrial fibrillation) (HCC)  -     AMB POC EKG ROUTINE W/ 12 LEADS, SCREEN ()  Primary osteoarthritis involving multiple joints  Acquired hypothyroidism  Crohn's disease without complication, unspecified gastrointestinal tract location (HCC)  Vitamin D deficiency  -     Vitamin D 25 Hydroxy; Future  Alcohol screening  -     DC ANNUAL ALCOHOL SCREEN 15 MIN  Medicare annual wellness visit, subsequent    ASSESSMENT:   1. Essential hypertension    2. Controlled type 2 diabetes mellitus with stage 2 chronic kidney disease, without long-term current use of insulin (HCC)    3. Mixed hyperlipidemia    4. PAF (paroxysmal atrial fibrillation) (HCC)    5. Primary osteoarthritis involving multiple joints    6. Acquired hypothyroidism    7. Crohn's disease without complication, unspecified gastrointestinal tract location (HCC)    8. Vitamin D deficiency    9. Alcohol screening    10. Medicare annual wellness visit, subsequent      Impression  1.  Hypertension is controlled so continue current therapy reviewed with her.  2.  Diabetes repeat status pending prior lab reviewed I will adjust treatment if necessary.  3 hyperlipidemia prior lab reviewed repeat status is pending  4 paroxysmal atrial fibrillation no recent symptoms.  EKG obtained today no acute process with normal sinus rhythm, decreased voltage, poor R wave progression, nonspecific anterior

## 2024-06-12 LAB
APPEARANCE UR: CLEAR
BACTERIA URNS QL MICRO: NEGATIVE /HPF
BILIRUB UR QL: NEGATIVE
COLOR UR: NORMAL
CREAT UR-MCNC: 94 MG/DL
EPITH CASTS URNS QL MICRO: NORMAL /LPF
GLUCOSE UR STRIP.AUTO-MCNC: NEGATIVE MG/DL
HGB UR QL STRIP: NEGATIVE
HYALINE CASTS URNS QL MICRO: NORMAL /LPF (ref 0–5)
KETONES UR QL STRIP.AUTO: NEGATIVE MG/DL
LEUKOCYTE ESTERASE UR QL STRIP.AUTO: NEGATIVE
MICROALBUMIN UR-MCNC: 0.8 MG/DL
MICROALBUMIN/CREAT UR-RTO: 9 MG/G (ref 0–30)
NITRITE UR QL STRIP.AUTO: NEGATIVE
PH UR STRIP: 5 (ref 5–8)
PROT UR STRIP-MCNC: NEGATIVE MG/DL
RBC #/AREA URNS HPF: NORMAL /HPF (ref 0–5)
SP GR UR REFRACTOMETRY: 1.02 (ref 1–1.03)
UROBILINOGEN UR QL STRIP.AUTO: 0.2 EU/DL (ref 0.2–1)
WBC URNS QL MICRO: NORMAL /HPF (ref 0–4)

## 2024-08-06 ENCOUNTER — OFFICE VISIT (OUTPATIENT)
Age: 79
End: 2024-08-06

## 2024-08-06 VITALS
HEART RATE: 98 BPM | DIASTOLIC BLOOD PRESSURE: 75 MMHG | BODY MASS INDEX: 26.16 KG/M2 | TEMPERATURE: 98.8 F | WEIGHT: 167 LBS | RESPIRATION RATE: 17 BRPM | SYSTOLIC BLOOD PRESSURE: 133 MMHG | OXYGEN SATURATION: 98 %

## 2024-08-06 DIAGNOSIS — B96.89 BACTERIAL URI: Primary | ICD-10-CM

## 2024-08-06 DIAGNOSIS — J06.9 BACTERIAL URI: Primary | ICD-10-CM

## 2024-08-06 RX ORDER — AZITHROMYCIN 250 MG/1
TABLET, FILM COATED ORAL
Qty: 6 TABLET | Refills: 0 | Status: SHIPPED | OUTPATIENT
Start: 2024-08-06 | End: 2024-08-16

## 2024-08-06 NOTE — PROGRESS NOTES
Substance Use Topics    Alcohol use: Yes    Drug use: No       Vitals:    08/06/24 1002   BP: 133/75   Pulse: 98   Resp: 17   Temp: 98.8 °F (37.1 °C)   SpO2: 98%       Review of Systems   Constitutional:  Negative for chills and fever.   Respiratory:  Positive for cough. Negative for shortness of breath.    Cardiovascular:  Negative for chest pain.       Objective     Physical Exam  Vitals and nursing note reviewed.   Constitutional:       General: She is not in acute distress.     Appearance: Normal appearance. She is not ill-appearing.   HENT:      Head: Normocephalic and atraumatic.   Cardiovascular:      Rate and Rhythm: Normal rate and regular rhythm.      Pulses: Normal pulses.      Heart sounds: Normal heart sounds.   Pulmonary:      Effort: Pulmonary effort is normal.      Breath sounds: Normal breath sounds.   Skin:     General: Skin is warm and dry.   Neurological:      Mental Status: She is alert and oriented to person, place, and time.         Assessment & Plan     Diagnoses and all orders for this visit:  Bacterial URI  -     XR CHEST (2 VIEWS); Future  Other orders  -     azithromycin (ZITHROMAX) 250 MG tablet; 500mg on day 1 followed by 250mg on days 2 - 5      No orders to display       Results for orders placed or performed in visit on 08/06/24   XR CHEST (2 VIEWS)    Narrative    Examination  Description: Chest xray, frontal and lateral views    Comparisons:  None provided.      Findings  The cardiac silhouette is within normal limits. There is mild dilatation and   tortuosity of the aorta     The lungs demonstrate minimal linear airspace opacities in both lung bases   without felix consolidation or vascular congestion. Early basilar   atelectatic/infiltrative change is suspected however.. There is mild asymmetric   elevation of the right hemidiaphragm     No pleural effusions are seen.    There is no pneumothorax present.     Diffuse degenerative changes noted in the spine.      Impression     VIEW ALL

## 2024-08-06 NOTE — PATIENT INSTRUCTIONS
Thank you for visiting [facility] today.    For relief at home, you may use the following to help with your cough:  Throat lozenges, hot tea or honey  Minimize contact with irritants such as cigarette smoke  Zyrtec, Claritin, Allegra or Xyzal may provide relief  Ibuprofen/Tylenol for pain or muscle aches.  Do not take ibuprofen if you have been prescribed prednisone.  Vicks VapoRub on the soles of feet with socks at night time  Saline nasal spray 8-10 times daily  Increase humidification in your home, preferably cool mist  Cough medications as prescribed  Vitamin C 75-90 mg daily  Zinc 40 mg daily    Follow up with your PCP if no improvement in 2 weeks.

## 2024-08-08 ASSESSMENT — ENCOUNTER SYMPTOMS
SHORTNESS OF BREATH: 0
COUGH: 1

## 2024-08-12 DIAGNOSIS — E11.22 CONTROLLED TYPE 2 DIABETES MELLITUS WITH STAGE 2 CHRONIC KIDNEY DISEASE, WITHOUT LONG-TERM CURRENT USE OF INSULIN (HCC): Primary | ICD-10-CM

## 2024-08-12 DIAGNOSIS — N18.2 CONTROLLED TYPE 2 DIABETES MELLITUS WITH STAGE 2 CHRONIC KIDNEY DISEASE, WITHOUT LONG-TERM CURRENT USE OF INSULIN (HCC): Primary | ICD-10-CM

## 2024-08-12 RX ORDER — NATEGLINIDE 120 MG/1
TABLET ORAL
Qty: 270 TABLET | Refills: 0 | Status: SHIPPED | OUTPATIENT
Start: 2024-08-12

## 2024-08-12 RX ORDER — NATEGLINIDE 120 MG/1
TABLET ORAL
Qty: 270 TABLET | Refills: 2 | OUTPATIENT
Start: 2024-08-12

## 2024-08-12 NOTE — TELEPHONE ENCOUNTER
PCP: Umang Arteaga MD    Last appt: 6/10/2024  Future Appointments   Date Time Provider Department Center   10/2/2024  9:00 AM Umang Arteaga MD [facility]       Requested Prescriptions     Pending Prescriptions Disp Refills    nateglinide (STARLIX) 120 MG tablet [Pharmacy Med Name: *NATEGLINIDE 120MG] 270 tablet 2     Sig: TAKE 1 TABLET BY MOUTH THREE TIMES DAILY BEFORE MEALS       Prior labs and Blood pressures:  BP Readings from Last 3 Encounters:   08/06/24 133/75   06/10/24 115/72   05/11/24 (!) 145/85     Lab Results   Component Value Date/Time     06/10/2024 10:46 AM    K 4.4 06/10/2024 10:46 AM     06/10/2024 10:46 AM    CO2 26 06/10/2024 10:46 AM    BUN 19 06/10/2024 10:46 AM    GFRAA >60 08/05/2022 10:58 AM     Lab Results   Component Value Date/Time    EBX8IFIH 5.9 05/06/2021 10:29 AM     Lab Results   Component Value Date/Time    CHOL 187 06/10/2024 10:46 AM    HDL 64 06/10/2024 10:46 AM    LDL 77 06/10/2024 10:46 AM    LDL 77 03/05/2024 10:18 AM    VLDL 46 06/10/2024 10:46 AM    VLDL 50 02/04/2021 10:54 AM     No results found for: \"VITD3\"        Lab Results   Component Value Date/Time    TSH 2.33 06/10/2024 10:46 AM

## 2024-08-12 NOTE — TELEPHONE ENCOUNTER
RX refill request from the patient/pharmacy. Patient last seen 06- with labs, and next appt. scheduled for 10-  Requested Prescriptions     Pending Prescriptions Disp Refills    nateglinide (STARLIX) 120 MG tablet 270 tablet 3     Sig: Take 1 tablet by mouth three times a day before meals.    .

## 2024-09-03 RX ORDER — VALSARTAN 160 MG/1
160 TABLET ORAL DAILY
Qty: 90 TABLET | Refills: 1 | Status: SHIPPED | OUTPATIENT
Start: 2024-09-03

## 2024-09-03 NOTE — TELEPHONE ENCOUNTER
RX refill request from the patient/pharmacy. Patient last seen 06- with labs, and next appt. scheduled for 10-  Requested Prescriptions     Pending Prescriptions Disp Refills    valsartan (DIOVAN) 160 MG tablet 90 tablet 1     Sig: Take 1 tablet by mouth daily    .

## 2024-09-24 ENCOUNTER — TELEPHONE (OUTPATIENT)
Facility: CLINIC | Age: 79
End: 2024-09-24

## 2024-09-24 NOTE — TELEPHONE ENCOUNTER
Patient called in stating that she has tested positive for covid x 2 today. States her symptoms started yesterday she is only having a mild scratchy throat that is not painful and a low grade fever. Would like to know if Dr. Arteaga will call in [place] for her?

## 2024-09-25 NOTE — PATIENT INSTRUCTIONS
Arthritis: Care Instructions Your Care Instructions Arthritis, also called osteoarthritis, is a breakdown of the cartilage that cushions your joints. When the cartilage wears down, your bones rub against each other. This causes pain and stiffness. Many people have some arthritis as they age. Arthritis most often affects the joints of the spine, hands, hips, knees, or feet. You can take simple measures to protect your joints, ease your pain, and help you stay active. Follow-up care is a key part of your treatment and safety. Be sure to make and go to all appointments, and call your doctor if you are having problems. It's also a good idea to know your test results and keep a list of the medicines you take. How can you care for yourself at home? · Stay at a healthy weight. Being overweight puts extra strain on your joints. · Talk to your doctor or physical therapist about exercises that will help ease joint pain. ? Stretch. You may enjoy gentle forms of yoga to help keep your joints and muscles flexible. ? Walk instead of jog. Other types of exercise that are less stressful on the joints include riding a bicycle, swimming, ginna chi, or water exercise. ? Lift weights. Strong muscles help reduce stress on your joints. Stronger thigh muscles, for example, take some of the stress off of the knees and hips. Learn the right way to lift weights so you do not make joint pain worse. · Take your medicines exactly as prescribed. Call your doctor if you think you are having a problem with your medicine. · Take pain medicines exactly as directed. ? If the doctor gave you a prescription medicine for pain, take it as prescribed. ? If you are not taking a prescription pain medicine, ask your doctor if you can take an over-the-counter medicine. · Use a cane, crutch, walker, or another device if you need help to get around. These can help rest your joints.  You also can use other things to make life easier, such as a higher toilet seat and padded handles on kitchen utensils. · Do not sit in low chairs, which can make it hard to get up. · Put heat or cold on your sore joints as needed. Use whichever helps you most. You also can take turns with hot and cold packs. ? Apply heat 2 or 3 times a day for 20 to 30 minutesusing a heating pad, hot shower, or hot packto relieve pain and stiffness. ? Put ice or a cold pack on your sore joint for 10 to 20 minutes at a time. Put a thin cloth between the ice and your skin. When should you call for help? Call your doctor now or seek immediate medical care if: 
  · You have sudden swelling, warmth, or pain in any joint.  
  · You have joint pain and a fever or rash.  
  · You have such bad pain that you cannot use a joint.  
 Watch closely for changes in your health, and be sure to contact your doctor if: 
  · You have mild joint symptoms that continue even with more than 6 weeks of care at home.  
  · You have stomach pain or other problems with your medicine. Where can you learn more? Go to http://michel-brittny.info/. Enter J189 in the search box to learn more about \"Arthritis: Care Instructions. \" Current as of: [place] 10, 2018 Content Version: 11.9 © 5586-2645 [employer]. Care instructions adapted under license by [employer] (which disclaims liability or warranty for this information). If you have questions about a medical condition or this instruction, always ask your healthcare professional. [place] [address] any warranty or liability for your use of this information. No

## 2024-09-26 ENCOUNTER — OFFICE VISIT (OUTPATIENT)
Age: 79
End: 2024-09-26

## 2024-09-26 VITALS
OXYGEN SATURATION: 96 % | WEIGHT: 174 LBS | BODY MASS INDEX: 27.25 KG/M2 | HEART RATE: 92 BPM | TEMPERATURE: 98.6 F | DIASTOLIC BLOOD PRESSURE: 72 MMHG | SYSTOLIC BLOOD PRESSURE: 138 MMHG

## 2024-09-26 DIAGNOSIS — B96.89 ACUTE BACTERIAL BRONCHITIS: ICD-10-CM

## 2024-09-26 DIAGNOSIS — U07.1 COVID-19: Primary | ICD-10-CM

## 2024-09-26 DIAGNOSIS — J20.8 ACUTE BACTERIAL BRONCHITIS: ICD-10-CM

## 2024-09-26 DIAGNOSIS — J00 ACUTE NASOPHARYNGITIS: ICD-10-CM

## 2024-09-26 RX ORDER — DOXYCYCLINE HYCLATE 100 MG
100 TABLET ORAL 2 TIMES DAILY
Qty: 14 TABLET | Refills: 0 | Status: SHIPPED | OUTPATIENT
Start: 2024-09-26 | End: 2024-10-03

## 2024-09-26 RX ORDER — BENZONATATE 200 MG/1
200 CAPSULE ORAL 3 TIMES DAILY PRN
Qty: 30 CAPSULE | Refills: 0 | Status: SHIPPED | OUTPATIENT
Start: 2024-09-26 | End: 2024-10-06

## 2024-09-26 RX ORDER — METAXALONE 800 MG/1
800 TABLET ORAL
COMMUNITY
Start: 2023-11-12

## 2024-09-27 ENCOUNTER — TELEPHONE (OUTPATIENT)
Facility: CLINIC | Age: 79
End: 2024-09-27

## 2024-10-02 ENCOUNTER — OFFICE VISIT (OUTPATIENT)
Facility: CLINIC | Age: 79
End: 2024-10-02
Payer: MEDICARE

## 2024-10-02 VITALS
OXYGEN SATURATION: 95 % | DIASTOLIC BLOOD PRESSURE: 60 MMHG | SYSTOLIC BLOOD PRESSURE: 130 MMHG | HEART RATE: 100 BPM | HEIGHT: 67 IN | RESPIRATION RATE: 18 BRPM | WEIGHT: 170.2 LBS | BODY MASS INDEX: 26.71 KG/M2 | TEMPERATURE: 98.3 F

## 2024-10-02 DIAGNOSIS — I48.0 PAF (PAROXYSMAL ATRIAL FIBRILLATION) (HCC): ICD-10-CM

## 2024-10-02 DIAGNOSIS — E78.2 MIXED HYPERLIPIDEMIA: ICD-10-CM

## 2024-10-02 DIAGNOSIS — E11.22 CONTROLLED TYPE 2 DIABETES MELLITUS WITH STAGE 2 CHRONIC KIDNEY DISEASE, WITHOUT LONG-TERM CURRENT USE OF INSULIN (HCC): ICD-10-CM

## 2024-10-02 DIAGNOSIS — I10 ESSENTIAL HYPERTENSION: Primary | ICD-10-CM

## 2024-10-02 DIAGNOSIS — N18.2 CONTROLLED TYPE 2 DIABETES MELLITUS WITH STAGE 2 CHRONIC KIDNEY DISEASE, WITHOUT LONG-TERM CURRENT USE OF INSULIN (HCC): ICD-10-CM

## 2024-10-02 DIAGNOSIS — M15.0 PRIMARY OSTEOARTHRITIS INVOLVING MULTIPLE JOINTS: ICD-10-CM

## 2024-10-02 DIAGNOSIS — K50.90 CROHN'S DISEASE WITHOUT COMPLICATION, UNSPECIFIED GASTROINTESTINAL TRACT LOCATION (HCC): ICD-10-CM

## 2024-10-02 PROCEDURE — 1090F PRES/ABSN URINE INCON ASSESS: CPT | Performed by: INTERNAL MEDICINE

## 2024-10-02 PROCEDURE — 3044F HG A1C LEVEL LT 7.0%: CPT | Performed by: INTERNAL MEDICINE

## 2024-10-02 PROCEDURE — G8427 DOCREV CUR MEDS BY ELIG CLIN: HCPCS | Performed by: INTERNAL MEDICINE

## 2024-10-02 PROCEDURE — G8419 CALC BMI OUT NRM PARAM NOF/U: HCPCS | Performed by: INTERNAL MEDICINE

## 2024-10-02 PROCEDURE — 1036F TOBACCO NON-USER: CPT | Performed by: INTERNAL MEDICINE

## 2024-10-02 PROCEDURE — G8484 FLU IMMUNIZE NO ADMIN: HCPCS | Performed by: INTERNAL MEDICINE

## 2024-10-02 PROCEDURE — 99214 OFFICE O/P EST MOD 30 MIN: CPT | Performed by: INTERNAL MEDICINE

## 2024-10-02 PROCEDURE — 3078F DIAST BP <80 MM HG: CPT | Performed by: INTERNAL MEDICINE

## 2024-10-02 PROCEDURE — 3075F SYST BP GE 130 - 139MM HG: CPT | Performed by: INTERNAL MEDICINE

## 2024-10-02 PROCEDURE — G8399 PT W/DXA RESULTS DOCUMENT: HCPCS | Performed by: INTERNAL MEDICINE

## 2024-10-02 PROCEDURE — 1123F ACP DISCUSS/DSCN MKR DOCD: CPT | Performed by: INTERNAL MEDICINE

## 2024-10-02 NOTE — PROGRESS NOTES
Trish Forbes is a 79 y.o. female     Chief Complaint   Patient presents with    Diabetes     3 month f/u     Hypertension    Cholesterol Problem       /60 (Site: Left Upper Arm, Position: Sitting, Cuff Size: Large Adult)   Pulse 100   Temp 98.3 °F (36.8 °C) (Oral)   Resp 18   Ht 1.702 m (5' 7\")   Wt 77.2 kg (170 lb 3.2 oz)   SpO2 95%   BMI 26.66 kg/m²     Health Maintenance Due   Topic Date Due    DTaP/Tdap/Td vaccine (1 - Tdap) Never done    Respiratory Syncytial Virus (RSV) Pregnant or age 60 yrs+ (1 - 1-dose 60+ series) Never done    Flu vaccine (1) 08/01/2024    COVID-19 Vaccine (6 - 2023-24 season) 09/01/2024         \"Have you been to the ER, urgent care clinic since your last visit?  Hospitalized since your last visit?\"    NO    “Have you seen or consulted any other health care providers outside of [facility] since your last visit?”    NO                    
nourished, appears age appropriate  EYES: sclera anicteric, PERRL, EOMI  ENMT:nares clear, moist mucous membranes, pharynx clear  NECK: supple. Thyroid normal, No JVD or bruits  RESPIRATORY: Chest: clear to ascultation and percussion, normal inspiratory effort  CARDIOVASCULAR: Heart: regular rate and rhythm no murmurs, rubs or gallops, PMI not displaced, No thrills, no peripheral edema  GASTROINTESTINAL: Abdomen: non distended, soft, non tender, bowel sounds normal  HEMATOLOGIC: no purpura, petechiae or bruising  LYMPHATIC: No lymph node enlargemant  MUSCULOSKELETAL: Extremities: no active synovitis, pulse 1+   INTEGUMENT: No unusual rashes or suspicious skin lesions noted. Nails appear normal.  PERIPHERAL VASCULAR: normal pulses femoral, PT and DP  NEUROLOGIC: non-focal exam, A & O X 3  PSYCHIATRIC:, appropriate affect     ASSESSMENT:   1. Essential hypertension    2. Controlled type 2 diabetes mellitus with stage 2 chronic kidney disease, without long-term current use of insulin ([facility])    3. Mixed hyperlipidemia    4. Primary osteoarthritis involving multiple joints    5. PAF (paroxysmal atrial fibrillation) ([facility])    6. Crohn's disease without complication, unspecified gastrointestinal tract location ([facility])      Impression  1.  Hypertension that is controlled so continue current therapy reviewed with her.  2.  Diabetes repeat status pending prior lab reviewed I will adjust treatment if needed.  3 hyperlipidemia prior lab reviewed repeat status pending I will adjust treatment if needed.  4 DJD that is stable  5 paroxysmal atrial fibrillation no recent symptoms  6 Crohn's disease stable  7 recent COVID infection she seems to be doing well  At this point since she just recently had COVID only a week ago I would probably wait about a month to get the flu shot and she will get that at her pharmacy.  Follow-up with me again in 3 months or sooner if there is a problem.  I will call her lab results in the

## 2024-10-03 RX ORDER — HYDROCHLOROTHIAZIDE 25 MG/1
25 TABLET ORAL DAILY
Qty: 90 TABLET | Refills: 3 | Status: SHIPPED | OUTPATIENT
Start: 2024-10-03

## 2024-10-03 NOTE — TELEPHONE ENCOUNTER
RX refill request from the patient/pharmacy. Patient last seen 10- with labs, and next appt. scheduled for 01-  Requested Prescriptions     Pending Prescriptions Disp Refills    hydroCHLOROthiazide (HYDRODIURIL) 25 MG tablet 90 tablet 3     Sig: Take 1 tablet by mouth daily    .

## 2024-10-03 NOTE — TELEPHONE ENCOUNTER
Pharmacy: [facility]    hydroCHLOROthiazide (HYDRODIURIL) 25 MG tablet [[id number]]    Order Details  Dose: 25 mg Route: Oral Frequency: DAILY   Dispense Quantity: 90 tablet Refills: 1          Sig: Take 1 tablet by mouth daily

## 2024-10-05 LAB
ALBUMIN SERPL-MCNC: 3.9 G/DL (ref 3.5–5)
ALBUMIN/GLOB SERPL: 1.2 (ref 1.1–2.2)
ALP SERPL-CCNC: 86 U/L (ref 45–117)
ALT SERPL-CCNC: 25 U/L (ref 12–78)
ANION GAP SERPL CALC-SCNC: 8 MMOL/L (ref 2–12)
AST SERPL-CCNC: 17 U/L (ref 15–37)
BILIRUB SERPL-MCNC: 0.8 MG/DL (ref 0.2–1)
BUN SERPL-MCNC: 41 MG/DL (ref 6–20)
BUN/CREAT SERPL: 34 (ref 12–20)
CALCIUM SERPL-MCNC: 10.1 MG/DL (ref 8.5–10.1)
CHLORIDE SERPL-SCNC: 101 MMOL/L (ref 97–108)
CHOLEST SERPL-MCNC: 174 MG/DL
CK SERPL-CCNC: 102 U/L (ref 26–192)
CO2 SERPL-SCNC: 27 MMOL/L (ref 21–32)
CREAT SERPL-MCNC: 1.22 MG/DL (ref 0.55–1.02)
EST. AVERAGE GLUCOSE BLD GHB EST-MCNC: 123 MG/DL
GLOBULIN SER CALC-MCNC: 3.3 G/DL (ref 2–4)
GLUCOSE SERPL-MCNC: 162 MG/DL (ref 65–100)
HBA1C MFR BLD: 5.9 % (ref 4–5.6)
HDLC SERPL-MCNC: 73 MG/DL
HDLC SERPL: 2.4 (ref 0–5)
LDLC SERPL CALC-MCNC: 57.6 MG/DL (ref 0–100)
POTASSIUM SERPL-SCNC: 4.8 MMOL/L (ref 3.5–5.1)
PROT SERPL-MCNC: 7.2 G/DL (ref 6.4–8.2)
SODIUM SERPL-SCNC: 136 MMOL/L (ref 136–145)
TRIGL SERPL-MCNC: 217 MG/DL
VLDLC SERPL CALC-MCNC: 43.4 MG/DL

## 2024-10-22 ENCOUNTER — LAB (OUTPATIENT)
Facility: CLINIC | Age: 79
End: 2024-10-22

## 2024-10-22 DIAGNOSIS — N18.2 STAGE 2 CHRONIC KIDNEY DISEASE: Primary | ICD-10-CM

## 2024-10-22 NOTE — PROGRESS NOTES
Per Dr. Arteaga lab order generated a lab order for [facility] and signed.

## 2024-10-23 LAB
ANION GAP SERPL CALC-SCNC: 4 MMOL/L (ref 2–12)
BUN SERPL-MCNC: 28 MG/DL (ref 6–20)
BUN/CREAT SERPL: 26 (ref 12–20)
CALCIUM SERPL-MCNC: 10 MG/DL (ref 8.5–10.1)
CHLORIDE SERPL-SCNC: 104 MMOL/L (ref 97–108)
CO2 SERPL-SCNC: 28 MMOL/L (ref 21–32)
CREAT SERPL-MCNC: 1.07 MG/DL (ref 0.55–1.02)
GLUCOSE SERPL-MCNC: 78 MG/DL (ref 65–100)
POTASSIUM SERPL-SCNC: 4.2 MMOL/L (ref 3.5–5.1)
SODIUM SERPL-SCNC: 136 MMOL/L (ref 136–145)

## 2024-12-05 ENCOUNTER — OFFICE VISIT (OUTPATIENT)
Age: 79
End: 2024-12-05

## 2024-12-05 VITALS
HEART RATE: 98 BPM | BODY MASS INDEX: 29.13 KG/M2 | WEIGHT: 186 LBS | TEMPERATURE: 97.9 F | OXYGEN SATURATION: 96 % | DIASTOLIC BLOOD PRESSURE: 85 MMHG | SYSTOLIC BLOOD PRESSURE: 159 MMHG

## 2024-12-05 DIAGNOSIS — J06.9 VIRAL URI WITH COUGH: Primary | ICD-10-CM

## 2024-12-05 RX ORDER — METHYLPREDNISOLONE 4 MG/1
TABLET ORAL
Qty: 1 KIT | Refills: 0 | Status: SHIPPED | OUTPATIENT
Start: 2024-12-05 | End: 2024-12-11

## 2024-12-05 NOTE — PROGRESS NOTES
Patient Name: Trish Forbes   YOB: 1945   Patient Status: Established patient,   Chief Complaint: Pleurisy (Pt present for pain in back while breathing left side )      ____________________________________________________________________________________________    External Records Reviewed: None    Limitation to History: None    Outside Historian: None    SUBJECTIVE/OBJECTIVE:  Trish Forbse is a 79 y.o. female presents with complaint of cough, congestion and pain to left lower chest with deep breathing.  Symptoms began 5 day(s) ago and are show no change since onset.  Patient reports that she usually gets pleurisy when she gets a cold and is always treated with steroids that usually help.  She declines chest x-ray stating she had one in September that showed the pleurisy and this feels the same way so would like the same treatment.   Patient describes pain as sharp moderate in severity, intermittent and without radiation.  Symptoms are aggravated by deep breathing and alleviated by nothing The patient denies fever, shortness of breath, and chest pain.  No other acute symptoms reported at this time.          PAST MEDICAL HISTORY:   Medical: Pt  has a past medical history of Acquired hypothyroidism (7/26/2017), CKD (chronic kidney disease) (7/26/2017), Crohn's disease ([facility]), Diabetes (HCC), GERD (gastroesophageal reflux disease) (12/1995), Hypertension, On statin therapy (7/26/2017), Pleurisy (07/17/2019), and Post-menopausal (7/26/2017).  Surgical: Pt  has a past surgical history that includes colonoscopy w/biopsy single/multiple (04/09/2014); Breast surgery; gi; orthopedic surgery; other surgical history; colonoscopy,biopsy (10/08/2021); Colonoscopy (N/A, 05/01/2018); Breast biopsy (Right); colonoscopy,biopsy (10/05/2021); Colonoscopy (N/A, 10/05/2021); and colonoscopy,biopsy (05/01/2018).  Family: Pt family history includes Cancer in her father; Diabetes in her mother; Heart Failure in her

## 2024-12-16 ENCOUNTER — TRANSCRIBE ORDERS (OUTPATIENT)
Facility: HOSPITAL | Age: 79
End: 2024-12-16

## 2024-12-16 DIAGNOSIS — Z12.31 VISIT FOR SCREENING MAMMOGRAM: Primary | ICD-10-CM

## 2024-12-18 ENCOUNTER — HOSPITAL ENCOUNTER (OUTPATIENT)
Facility: HOSPITAL | Age: 79
Discharge: HOME | End: 2024-12-21
Attending: INTERNAL MEDICINE
Payer: MEDICARE

## 2024-12-18 VITALS — HEIGHT: 67 IN | WEIGHT: 186 LBS | BODY MASS INDEX: 29.19 KG/M2

## 2024-12-18 DIAGNOSIS — Z12.31 VISIT FOR SCREENING MAMMOGRAM: ICD-10-CM

## 2024-12-18 PROCEDURE — 77063 BREAST TOMOSYNTHESIS BI: CPT

## 2025-01-02 NOTE — PROGRESS NOTES
Chief Complaint   Patient presents with    3 Month Follow-Up       SUBJECTIVE:    Trish Forbes is a 79 y.o. female who returns in follow-up for medical problems include hypertension, diabetes, hyperlipidemia, DJD, paroxysmal atrial fibrillation, Crohn's disease and other medical problems.  She has had problems in the past with recurrent pleurisy and she is developed an recurrence of that now.  It does hurt on the right side of her chest when she takes a deep breath which is the same has been before.  It has always responded to steroid Dosepaks in the past.  She notes no chest pain otherwise no palpitations, PND, orthopnea or other cardiac or respiratory complaints.  There are no current GI or  complaints.  She notes no headaches, dizziness or neurologic complaints.  There are no new arthritic complaints and no other complaints Ogilvy review of systems.      Current Outpatient Medications   Medication Sig Dispense Refill    predniSONE 5 MG (21) TBPK Take prednisone 5 mg 6-day Dosepak as directed 1 each 0    hydroCHLOROthiazide (HYDRODIURIL) 25 MG tablet Take 1 tablet by mouth daily 90 tablet 3    metaxalone (SKELAXIN) 800 MG tablet 1 tablet by NOT APPLICABLE route      valsartan (DIOVAN) 160 MG tablet Take 1 tablet by mouth daily 90 tablet 1    nateglinide (STARLIX) 120 MG tablet Take 1 tablet by mouth three times a day before meals. 270 tablet 0    levothyroxine (SYNTHROID) 50 MCG tablet TAKE 1 TABLET BY MOUTH EVERY DAY 90 tablet 3    metFORMIN (GLUCOPHAGE-XR) 500 MG extended release tablet TAKE 1 TABLET BY MOUTH 2 TIMES DAILY (WITH MEALS) 180 tablet 2    hydrocortisone 2.5 % cream       amLODIPine (NORVASC) 5 MG tablet TAKE 1 TABLET EVERY DAY 90 tablet 3    Glucosamine-Fish Oil-EPA--630-08-40 MG CAPS Take 1 tablet by mouth daily      Multiple Vitamins-Minerals (THERAPEUTIC MULTIVITAMIN-MINERALS W/ IRON) TABS tablet Take 1 tablet by mouth daily      diclofenac (VOLTAREN) 75 MG EC tablet TAKE 1

## 2025-01-03 ENCOUNTER — OFFICE VISIT (OUTPATIENT)
Facility: CLINIC | Age: 80
End: 2025-01-03

## 2025-01-03 VITALS
HEIGHT: 67 IN | OXYGEN SATURATION: 94 % | SYSTOLIC BLOOD PRESSURE: 127 MMHG | RESPIRATION RATE: 16 BRPM | WEIGHT: 176.6 LBS | DIASTOLIC BLOOD PRESSURE: 70 MMHG | TEMPERATURE: 98.4 F | HEART RATE: 96 BPM | BODY MASS INDEX: 27.72 KG/M2

## 2025-01-03 DIAGNOSIS — E78.2 MIXED HYPERLIPIDEMIA: ICD-10-CM

## 2025-01-03 DIAGNOSIS — N18.2 CONTROLLED TYPE 2 DIABETES MELLITUS WITH STAGE 2 CHRONIC KIDNEY DISEASE, WITHOUT LONG-TERM CURRENT USE OF INSULIN (HCC): ICD-10-CM

## 2025-01-03 DIAGNOSIS — I48.0 PAF (PAROXYSMAL ATRIAL FIBRILLATION) (HCC): ICD-10-CM

## 2025-01-03 DIAGNOSIS — K56.609 SBO (SMALL BOWEL OBSTRUCTION) (HCC): ICD-10-CM

## 2025-01-03 DIAGNOSIS — E11.22 CONTROLLED TYPE 2 DIABETES MELLITUS WITH STAGE 2 CHRONIC KIDNEY DISEASE, WITHOUT LONG-TERM CURRENT USE OF INSULIN (HCC): ICD-10-CM

## 2025-01-03 DIAGNOSIS — I10 ESSENTIAL HYPERTENSION: Primary | ICD-10-CM

## 2025-01-03 DIAGNOSIS — K50.90 CROHN'S DISEASE WITHOUT COMPLICATION, UNSPECIFIED GASTROINTESTINAL TRACT LOCATION (HCC): ICD-10-CM

## 2025-01-03 DIAGNOSIS — M15.0 PRIMARY OSTEOARTHRITIS INVOLVING MULTIPLE JOINTS: ICD-10-CM

## 2025-01-03 LAB
ALBUMIN SERPL-MCNC: 3.5 G/DL (ref 3.5–5)
ALBUMIN/GLOB SERPL: 1 (ref 1.1–2.2)
ALP SERPL-CCNC: 90 U/L (ref 45–117)
ALT SERPL-CCNC: 29 U/L (ref 12–78)
ANION GAP SERPL CALC-SCNC: 8 MMOL/L (ref 2–12)
AST SERPL-CCNC: 21 U/L (ref 15–37)
BILIRUB SERPL-MCNC: 0.4 MG/DL (ref 0.2–1)
BUN SERPL-MCNC: 26 MG/DL (ref 6–20)
BUN/CREAT SERPL: 22 (ref 12–20)
CALCIUM SERPL-MCNC: 9.3 MG/DL (ref 8.5–10.1)
CHLORIDE SERPL-SCNC: 104 MMOL/L (ref 97–108)
CHOLEST SERPL-MCNC: 219 MG/DL
CK SERPL-CCNC: 151 U/L (ref 26–192)
CO2 SERPL-SCNC: 24 MMOL/L (ref 21–32)
CREAT SERPL-MCNC: 1.2 MG/DL (ref 0.55–1.02)
EST. AVERAGE GLUCOSE BLD GHB EST-MCNC: 137 MG/DL
GLOBULIN SER CALC-MCNC: 3.6 G/DL (ref 2–4)
GLUCOSE SERPL-MCNC: 185 MG/DL (ref 65–100)
HBA1C MFR BLD: 6.4 % (ref 4–5.6)
HDLC SERPL-MCNC: 75 MG/DL
HDLC SERPL: 2.9 (ref 0–5)
LDLC SERPL CALC-MCNC: 94 MG/DL (ref 0–100)
POTASSIUM SERPL-SCNC: 3.7 MMOL/L (ref 3.5–5.1)
PROT SERPL-MCNC: 7.1 G/DL (ref 6.4–8.2)
SODIUM SERPL-SCNC: 136 MMOL/L (ref 136–145)
TRIGL SERPL-MCNC: 250 MG/DL
VLDLC SERPL CALC-MCNC: 50 MG/DL

## 2025-01-03 RX ORDER — PREDNISONE 5 MG/1
TABLET ORAL
Qty: 1 EACH | Refills: 0 | Status: SHIPPED | OUTPATIENT
Start: 2025-01-03

## 2025-01-03 SDOH — ECONOMIC STABILITY: TRANSPORTATION INSECURITY
IN THE PAST 12 MONTHS, HAS LACK OF TRANSPORTATION KEPT YOU FROM MEETINGS, WORK, OR FROM GETTING THINGS NEEDED FOR DAILY LIVING?: NO

## 2025-01-03 SDOH — ECONOMIC STABILITY: FOOD INSECURITY: WITHIN THE PAST 12 MONTHS, THE FOOD YOU BOUGHT JUST DIDN'T LAST AND YOU DIDN'T HAVE MONEY TO GET MORE.: NEVER TRUE

## 2025-01-03 SDOH — ECONOMIC STABILITY: FOOD INSECURITY: WITHIN THE PAST 12 MONTHS, YOU WORRIED THAT YOUR FOOD WOULD RUN OUT BEFORE YOU GOT MONEY TO BUY MORE.: NEVER TRUE

## 2025-01-03 SDOH — ECONOMIC STABILITY: INCOME INSECURITY: HOW HARD IS IT FOR YOU TO PAY FOR THE VERY BASICS LIKE FOOD, HOUSING, MEDICAL CARE, AND HEATING?: NOT HARD AT ALL

## 2025-01-03 ASSESSMENT — PATIENT HEALTH QUESTIONNAIRE - PHQ9
SUM OF ALL RESPONSES TO PHQ QUESTIONS 1-9: 0
2. FEELING DOWN, DEPRESSED OR HOPELESS: NOT AT ALL
1. LITTLE INTEREST OR PLEASURE IN DOING THINGS: NOT AT ALL
SUM OF ALL RESPONSES TO PHQ QUESTIONS 1-9: 0
SUM OF ALL RESPONSES TO PHQ QUESTIONS 1-9: 0
SUM OF ALL RESPONSES TO PHQ9 QUESTIONS 1 & 2: 0
SUM OF ALL RESPONSES TO PHQ QUESTIONS 1-9: 0

## 2025-01-03 NOTE — PROGRESS NOTES
Chief Complaint   Patient presents with    3 Month Follow-Up     /70   Pulse (!) 104   Temp 98.4 °F (36.9 °C)   Resp 16   Ht 1.702 m (5' 7.01\")   Wt 80.1 kg (176 lb 9.6 oz)   SpO2 94%   BMI 27.65 kg/m²   \"Have you been to the ER, urgent care clinic since your last visit?  Hospitalized since your last visit?\"    NO    “Have you seen or consulted any other health care providers outside our system since your last visit?”    NO

## 2025-01-10 DIAGNOSIS — N18.2 CONTROLLED TYPE 2 DIABETES MELLITUS WITH STAGE 2 CHRONIC KIDNEY DISEASE, WITHOUT LONG-TERM CURRENT USE OF INSULIN (HCC): ICD-10-CM

## 2025-01-10 DIAGNOSIS — E11.22 CONTROLLED TYPE 2 DIABETES MELLITUS WITH STAGE 2 CHRONIC KIDNEY DISEASE, WITHOUT LONG-TERM CURRENT USE OF INSULIN (HCC): ICD-10-CM

## 2025-01-10 RX ORDER — NATEGLINIDE 120 MG/1
TABLET ORAL
Qty: 270 TABLET | Refills: 0 | Status: SHIPPED | OUTPATIENT
Start: 2025-01-10

## 2025-01-10 NOTE — TELEPHONE ENCOUNTER
PCP: Umang Arteaga MD    Last appt: Visit date not found    Future Appointments   Date Time Provider Department Center   4/9/2025  8:20 AM Umang Arteaga MD [facility] DEP       Requested Prescriptions     Pending Prescriptions Disp Refills    nateglinide (STARLIX) 120 MG tablet [Pharmacy Med Name: nateglinide 120 mg tablet] 270 tablet 0     Sig: TAKE 1 TABLET BY MOUTH THREE TIMES A DAY BEFORE MEALS.

## 2025-01-28 RX ORDER — AMLODIPINE BESYLATE 5 MG/1
5 TABLET ORAL DAILY
Qty: 90 TABLET | Refills: 3 | Status: SHIPPED | OUTPATIENT
Start: 2025-01-28

## 2025-01-28 NOTE — TELEPHONE ENCOUNTER
RX refill request from the patient/pharmacy. Patient last seen 01- with labs, and next appt. scheduled for 04-  Requested Prescriptions     Pending Prescriptions Disp Refills    amLODIPine (NORVASC) 5 MG tablet [Pharmacy Med Name: amlodipine 5 mg tablet] 90 tablet 3     Sig: TAKE 1 TABLET EVERY DAY    .

## 2025-02-05 RX ORDER — METFORMIN HYDROCHLORIDE 500 MG/1
500 TABLET, EXTENDED RELEASE ORAL 2 TIMES DAILY WITH MEALS
Qty: 180 TABLET | Refills: 2 | Status: SHIPPED | OUTPATIENT
Start: 2025-02-05

## 2025-02-27 RX ORDER — VALSARTAN 160 MG/1
160 TABLET ORAL DAILY
Qty: 90 TABLET | Refills: 1 | Status: SHIPPED | OUTPATIENT
Start: 2025-02-27

## 2025-02-27 NOTE — TELEPHONE ENCOUNTER
RX refill request from the patient/pharmacy. Patient last seen 01/03/2025 with labs, and next appt. scheduled for 04/09/2025.   Requested Prescriptions     Pending Prescriptions Disp Refills    valsartan (DIOVAN) 160 MG tablet 90 tablet 1     Sig: Take 1 tablet by mouth daily

## 2025-04-01 RX ORDER — SODIUM CHLORIDE 0.9 % (FLUSH) 0.9 %
5-40 SYRINGE (ML) INJECTION EVERY 12 HOURS SCHEDULED
Status: CANCELLED | OUTPATIENT
Start: 2025-04-01

## 2025-04-01 RX ORDER — SODIUM CHLORIDE 0.9 % (FLUSH) 0.9 %
5-40 SYRINGE (ML) INJECTION PRN
Status: CANCELLED | OUTPATIENT
Start: 2025-04-01

## 2025-04-01 RX ORDER — SODIUM CHLORIDE 9 MG/ML
INJECTION, SOLUTION INTRAVENOUS PRN
Status: CANCELLED | OUTPATIENT
Start: 2025-04-01

## 2025-04-02 ENCOUNTER — HOSPITAL ENCOUNTER (OUTPATIENT)
Facility: HOSPITAL | Age: 80
Setting detail: OUTPATIENT SURGERY
Discharge: HOME | End: 2025-04-02
Attending: INTERNAL MEDICINE | Admitting: INTERNAL MEDICINE
Payer: MEDICARE

## 2025-04-02 ENCOUNTER — ANESTHESIA EVENT (OUTPATIENT)
Facility: HOSPITAL | Age: 80
End: 2025-04-02
Payer: MEDICARE

## 2025-04-02 ENCOUNTER — ANESTHESIA (OUTPATIENT)
Facility: HOSPITAL | Age: 80
End: 2025-04-02
Payer: MEDICARE

## 2025-04-02 VITALS
WEIGHT: 180.7 LBS | BODY MASS INDEX: 28.36 KG/M2 | DIASTOLIC BLOOD PRESSURE: 70 MMHG | SYSTOLIC BLOOD PRESSURE: 138 MMHG | HEIGHT: 67 IN | OXYGEN SATURATION: 98 % | RESPIRATION RATE: 31 BRPM | HEART RATE: 96 BPM | TEMPERATURE: 97.8 F

## 2025-04-02 PROCEDURE — 3600007502: Performed by: INTERNAL MEDICINE

## 2025-04-02 PROCEDURE — 3700000001 HC ADD 15 MINUTES (ANESTHESIA): Performed by: INTERNAL MEDICINE

## 2025-04-02 PROCEDURE — 7100000010 HC PHASE II RECOVERY - FIRST 15 MIN: Performed by: INTERNAL MEDICINE

## 2025-04-02 PROCEDURE — 3600007512: Performed by: INTERNAL MEDICINE

## 2025-04-02 PROCEDURE — 7100000011 HC PHASE II RECOVERY - ADDTL 15 MIN: Performed by: INTERNAL MEDICINE

## 2025-04-02 PROCEDURE — 88305 TISSUE EXAM BY PATHOLOGIST: CPT

## 2025-04-02 PROCEDURE — 6360000002 HC RX W HCPCS: Performed by: REGISTERED NURSE

## 2025-04-02 PROCEDURE — 3700000000 HC ANESTHESIA ATTENDED CARE: Performed by: INTERNAL MEDICINE

## 2025-04-02 PROCEDURE — 2709999900 HC NON-CHARGEABLE SUPPLY: Performed by: INTERNAL MEDICINE

## 2025-04-02 PROCEDURE — 2580000003 HC RX 258: Performed by: REGISTERED NURSE

## 2025-04-02 RX ORDER — SODIUM CHLORIDE 9 MG/ML
INJECTION, SOLUTION INTRAVENOUS
Status: DISCONTINUED | OUTPATIENT
Start: 2025-04-02 | End: 2025-04-02 | Stop reason: SDUPTHER

## 2025-04-02 RX ADMIN — SODIUM CHLORIDE: 9 INJECTION, SOLUTION INTRAVENOUS at 08:08

## 2025-04-02 RX ADMIN — PROPOFOL 80 MG: 10 INJECTION, EMULSION INTRAVENOUS at 08:12

## 2025-04-02 RX ADMIN — PROPOFOL 40 MG: 10 INJECTION, EMULSION INTRAVENOUS at 08:19

## 2025-04-02 RX ADMIN — LIDOCAINE HYDROCHLORIDE 50 MG: 20 INJECTION, SOLUTION EPIDURAL; INFILTRATION; INTRACAUDAL; PERINEURAL at 08:12

## 2025-04-02 RX ADMIN — PROPOFOL 30 MG: 10 INJECTION, EMULSION INTRAVENOUS at 08:16

## 2025-04-02 RX ADMIN — PROPOFOL 30 MG: 10 INJECTION, EMULSION INTRAVENOUS at 08:23

## 2025-04-02 ASSESSMENT — PAIN - FUNCTIONAL ASSESSMENT: PAIN_FUNCTIONAL_ASSESSMENT: 0-10

## 2025-04-02 NOTE — OP NOTE
[facility]                  Colonoscopy Operative Report    4/2/2025      Trish Forbes  [id number]  1945    Procedure Type:   Colonoscopy --screening     Indications:   Hx of Crohn's disease      Pre-operative Diagnosis: see indication above    Post-operative Diagnosis:  See findings below    :  CHELSIE Erickson Jr, MD    Referring Provider: Umang Arteaga MD      Sedation:  MAC anesthesia Propofol    Pre-Procedural Exam:      Airway: clear,  No airway problems anticipated  Heart: RRR, without gallops or rubs  Lungs: clear bilaterally without wheezes, crackles, or rhonchi  Abdomen: soft, nontender, nondistended, bowel sounds present  Mental Status: awake, alert and oriented to person, place and time     Procedure Details:  After informed consent was obtained with all risks and benefits of procedure explained and preoperative exam completed, the patient was taken to the endoscopy suite and placed in the left lateral decubitus position.  Upon sequential sedation as per above, a digital rectal exam was performed .  The Olympus videocolonoscope  was inserted in the rectum and carefully advanced to the surgical anastomosis .   The quality of preparation was good.  The colonoscope was slowly withdrawn with careful evaluation between folds. Retroflexion in the rectum was completed demonstrating internal hemorrhoids.     Findings:    Rectum: Grade 1 internal and external hemorrhoid(s)   Mucosal scarring, lack of vascularity--biopsied;   Sigmoid: Mucosal scarring, lack of vascularity, multiple pseudopolyps--biopsied;   Descending Colon:  Mucosal scarring, lack of vascularity, multiple pseudopolyps--biopsied;   Transverse Colon:  Mucosal scarring, lack of vascularity, multiple pseudopolyps--biopsied;   Ascending Colon: surgically absent   Cecum: surgically absent   Terminal Ileum: not intubated    Specimen Removed:   Biopsies of transverse; descending; sigmoid and

## 2025-04-02 NOTE — PROGRESS NOTES
Endoscopy Case End Note:    0827:  Procedure scope was pre-cleaned, per protocol, at bedside by David/Jacob.      0829:  Report received from anesthesia - SUNIL Machado.  See anesthesia flowsheet for intra-procedure vital signs and events.    0830:  glasses returned to patient.

## 2025-04-02 NOTE — DISCHARGE INSTRUCTIONS
LILY Erickson MD  [facility]  [address], [address]  [place] [address] [phone]  [email or web address]    Trish Forbes  [id number]  1945    COLON DISCHARGE INSTRUCTIONS  Discomfort:  Redness at IV site- apply warm compress to area; if redness or soreness persist- contact your physician  There may be a slight amount of blood passed from the rectum  Gaseous discomfort- walking, belching will help relieve any discomfort  You may not operate a vehicle for 12 hours  You may not engage in an occupation involving machinery or appliances for rest of today  You may not drink alcoholic beverages for at least 12 hours  Avoid making any critical decisions for at least 24 hour  DIET:   High fiber diet.   - however -  remember your colon is empty and a heavy meal will produce gas.   Avoid these foods:  vegetables, fried / greasy foods, carbonated drinks for today      ACTIVITY:  You may resume your normal daily activities it is recommended that you spend the remainder of the day resting -  avoid any strenuous activity.    CALL M.D.  ANY SIGN OF:   Increasing pain, nausea, vomiting  Abdominal distension (swelling)  New increased bleeding (oral or rectal)  Fever (chills)  Pain in chest area  Bloody discharge from nose or mouth  Shortness of breath     COLONOSCOPY FINDINGS:  Your colonoscopy showed: No active Crohn's disease. Took surveillance biopsies.    Follow-up Instructions:   Call Dr. CHELSIE Erickson if any questions or problems.   Telephone # [phone]  Dr. Erickson's office will notify you of the biopsy results within 7 to 10 days.    Patient Education on Sedation / Analgesia Administered for Procedure      For 24 hours after general anesthesia or intravenous analgesia / sedation:  Have someone responsible help you with your care  Limit your activities  Do not drive and operate hazardous machinery  Do not make important personal, legal or

## 2025-04-02 NOTE — ANESTHESIA POSTPROCEDURE EVALUATION
Department of Anesthesiology  Postprocedure Note    Patient: Trish Forbes  MRN: [id number]  YOB: 1945  Date of evaluation: 4/2/2025    Procedure Summary       Date: 04/02/25 Room / Location: [facility] 04 / [facility] ENDOSCOPY    Anesthesia Start: 0808 Anesthesia Stop: 0830    Procedure: COLONOSCOPY (Lower GI Region) Diagnosis:       Crohn's disease with complication, unspecified gastrointestinal tract location (HCC)      (Crohn's disease with complication, unspecified gastrointestinal tract location (HCC) [K50.919])    Surgeons: Jamari Erickson Jr., MD Responsible Provider: Silvino Blakcwood MD    Anesthesia Type: MAC ASA Status: 3            Anesthesia Type: MAC    Corey Phase I: Corey Score: 10    Corey Phase II: Corey Score: 10    Anesthesia Post Evaluation    Patient location during evaluation: PACU  Patient participation: complete - patient participated  Level of consciousness: sleepy but conscious and responsive to verbal stimuli  Pain score: 2  Airway patency: patent  Nausea & Vomiting: no vomiting and no nausea  Cardiovascular status: blood pressure returned to baseline and hemodynamically stable  Respiratory status: acceptable  Hydration status: stable  Multimodal analgesia pain management approach  Pain management: adequate    No notable events documented.

## 2025-04-02 NOTE — PROGRESS NOTES
ARRIVAL INFORMATION:  Verified patient name and date of birth, scheduled procedure, and informed consent.     : Araceli benoit   contact number: [phone]  Physician and staff can share information with the .     Receive texts: yes    Belongings with patient include:  Clothing,Glasses    GI FOCUSED ASSESSMENT:  Neuro: Awake, alert, oriented x4  Respiratory: even and unlabored   GI: soft and non-distended  EKG Rhythm: normal sinus rhythm    Education:Reviewed general discharge instructions and  information.

## 2025-04-02 NOTE — H&P
LILY Erickson MD  [facility]  [address], [address]  [place] [address] [phone]  [email or web address]    Gastroenterology Outpatient History and Physical    Patient: Trish Forbes    Physician: CHELSIE Erickson MD    Vital Signs: Blood pressure 125/73, pulse 97, resp. rate 20, height 1.702 m (5' 7\"), weight 82 kg (180 lb 11.2 oz), SpO2 97%.    Allergies:   Allergies   Allergen Reactions    Lisinopril Cough       Chief Complaint: Screening colonoscopy. Follow up Crohn's    History of Present Illness: Here for a screening colonoscopy.  Last colonoscopy was 2021 and showed inactive Crohn's. Currently has no GI symptoms. No FH of colon cancer or polyps.    History:  Past Medical History:   Diagnosis Date    Acquired hypothyroidism 7/26/2017    CKD (chronic kidney disease) 7/26/2017    Crohn's disease (HCC)     Diabetes (HCC)     type 2    GERD (gastroesophageal reflux disease) 12/1995    Hypertension     On statin therapy 7/26/2017    Pleurisy 07/17/2019    Post-menopausal 7/26/2017      Past Surgical History:   Procedure Laterality Date    BREAST BIOPSY Right     over 30 years  negative    BREAST SURGERY      cyst removed right    COLONOSCOPY N/A 05/01/2018    COLONOSCOPY performed by Jamari Erickson Jr., MD at [facility] ENDOSCOPY    COLONOSCOPY N/A 10/05/2021    COLONOSCOPY performed by Jamari Erickson Jr., MD at [facility] ENDOSCOPY    COLONOSCOPY W/BIOPSY SINGLE/MULTIPLE  04/09/2014         COLONOSCOPY,BIOPSY  10/08/2021         COLONOSCOPY,BIOPSY  10/05/2021         COLONOSCOPY,BIOPSY  05/01/2018         GI      tumor from colon removed-benign    ORTHOPEDIC SURGERY      right wrist-fx repair plate in place    OTHER SURGICAL HISTORY      Bowel Obstruction      Social History     Socioeconomic History    Marital status:      Spouse name: None    Number of children: None    Years of education: None    Highest education level: None

## 2025-04-02 NOTE — ANESTHESIA PRE PROCEDURE
Department of Anesthesiology  Preprocedure Note       Name:  Trish Forbes   Age:  80 y.o.  :  1945                                          MRN:  [id number]         Date:  2025      Surgeon: Surgeon(s):  Jamari Erickson Jr., MD    Procedure: Procedure(s):  COLONOSCOPY    Medications prior to admission:   Prior to Admission medications    Medication Sig Start Date End Date Taking? Authorizing Provider   valsartan (DIOVAN) 160 MG tablet Take 1 tablet by mouth daily 25   Umang Arteaga MD   metFORMIN (GLUCOPHAGE-XR) 500 MG extended release tablet TAKE 1 TABLET BY MOUTH 2 TIMES DAILY (WITH MEALS) 25   Umang Arteaga MD   amLODIPine (NORVASC) 5 MG tablet TAKE 1 TABLET EVERY DAY 25   Umang Arteaga MD   nateglinide (STARLIX) 120 MG tablet TAKE 1 TABLET BY MOUTH THREE TIMES A DAY BEFORE MEALS. 1/10/25   Umang Arteaga MD   predniSONE 5 MG (21) TBPK Take prednisone 5 mg 6-day Dosepak as directed  Patient taking differently: as needed Take prednisone 5 mg 6-day Dosepak as directed 1/3/25   Umang Arteaga MD   hydroCHLOROthiazide (HYDRODIURIL) 25 MG tablet Take 1 tablet by mouth daily 10/3/24   Umang Arteaga MD   metaxalone (SKELAXIN) 800 MG tablet 1 tablet by NOT APPLICABLE route as needed 23   Kt Juares MD   levothyroxine (SYNTHROID) 50 MCG tablet TAKE 1 TABLET BY MOUTH EVERY DAY 5/15/24   Umang Arteaga MD   hydrocortisone 2.5 % cream daily 23   Kt Juares MD   Glucosamine-Fish Oil-EPA--681-91-40 MG CAPS Take 1 tablet by mouth daily    Kt Juares MD   Multiple Vitamins-Minerals (THERAPEUTIC MULTIVITAMIN-MINERALS W/ IRON) TABS tablet Take 1 tablet by mouth daily    Kt Juares MD   diclofenac (VOLTAREN) 75 MG EC tablet TAKE 1 TABLET TWICE A DAY WITH FOOD FOR PAIN AND INFLAMMATION (WITH BREAKFAST AND SUPPER) 7/10/23   Umang Arteaga MD   calcium carb-cholecalciferol 600-10 MG-MCG TABS

## 2025-04-08 SDOH — ECONOMIC STABILITY: INCOME INSECURITY: IN THE LAST 12 MONTHS, WAS THERE A TIME WHEN YOU WERE NOT ABLE TO PAY THE MORTGAGE OR RENT ON TIME?: NO

## 2025-04-08 NOTE — PROGRESS NOTES
Chief Complaint   Patient presents with    Hypertension     3 month f/up    Diabetes    Hyperlipidemia    Hypothyroidism       SUBJECTIVE:    Trish Forbes is a 80 y.o. female who returns in follow-up of her medical problems include hypertension, hyperlipidemia, paroxysmal atrial fibrillation, diabetes, Crohn's disease with history of small bowel obstruction and other multiple medical problems.  She recently had a colonoscopy and everything seemed to be clear there.  She notes no chest pain, shortness of breath or cardiac or respiratory complaints.  There are no current GI or  complaints.  She has no headaches, dizziness or new neurologic complaints.  There are no current active arthritic complaints and she has no other complaints on complete review of systems.      Current Outpatient Medications   Medication Sig Dispense Refill    valsartan (DIOVAN) 160 MG tablet Take 1 tablet by mouth daily 90 tablet 1    metFORMIN (GLUCOPHAGE-XR) 500 MG extended release tablet TAKE 1 TABLET BY MOUTH 2 TIMES DAILY (WITH MEALS) 180 tablet 2    amLODIPine (NORVASC) 5 MG tablet TAKE 1 TABLET EVERY DAY 90 tablet 3    nateglinide (STARLIX) 120 MG tablet TAKE 1 TABLET BY MOUTH THREE TIMES A DAY BEFORE MEALS. 270 tablet 0    hydroCHLOROthiazide (HYDRODIURIL) 25 MG tablet Take 1 tablet by mouth daily 90 tablet 3    levothyroxine (SYNTHROID) 50 MCG tablet TAKE 1 TABLET BY MOUTH EVERY DAY 90 tablet 3    Glucosamine-Fish Oil-EPA--195-41-40 MG CAPS Take 1 tablet by mouth daily      Multiple Vitamins-Minerals (THERAPEUTIC MULTIVITAMIN-MINERALS W/ IRON) TABS tablet Take 1 tablet by mouth daily      calcium carb-cholecalciferol 600-10 MG-MCG TABS per tab Take 1 tablet by mouth daily       No current facility-administered medications for this visit.     Past Medical History:   Diagnosis Date    Acquired hypothyroidism 7/26/2017    CKD (chronic kidney disease) 7/26/2017    Crohn's disease (HCC)     Diabetes (HCC)     type 2

## 2025-04-09 ENCOUNTER — OFFICE VISIT (OUTPATIENT)
Facility: CLINIC | Age: 80
End: 2025-04-09
Payer: MEDICARE

## 2025-04-09 ENCOUNTER — RESULTS FOLLOW-UP (OUTPATIENT)
Facility: CLINIC | Age: 80
End: 2025-04-09

## 2025-04-09 VITALS
DIASTOLIC BLOOD PRESSURE: 80 MMHG | HEART RATE: 94 BPM | SYSTOLIC BLOOD PRESSURE: 122 MMHG | HEIGHT: 67 IN | OXYGEN SATURATION: 97 % | BODY MASS INDEX: 28.52 KG/M2 | TEMPERATURE: 98.6 F | WEIGHT: 181.7 LBS

## 2025-04-09 DIAGNOSIS — K50.90 CROHN'S DISEASE WITHOUT COMPLICATION, UNSPECIFIED GASTROINTESTINAL TRACT LOCATION (HCC): ICD-10-CM

## 2025-04-09 DIAGNOSIS — N18.2 CONTROLLED TYPE 2 DIABETES MELLITUS WITH STAGE 2 CHRONIC KIDNEY DISEASE, WITHOUT LONG-TERM CURRENT USE OF INSULIN (HCC): ICD-10-CM

## 2025-04-09 DIAGNOSIS — I10 ESSENTIAL HYPERTENSION: Primary | ICD-10-CM

## 2025-04-09 DIAGNOSIS — I48.0 PAF (PAROXYSMAL ATRIAL FIBRILLATION) (HCC): ICD-10-CM

## 2025-04-09 DIAGNOSIS — E11.22 CONTROLLED TYPE 2 DIABETES MELLITUS WITH STAGE 2 CHRONIC KIDNEY DISEASE, WITHOUT LONG-TERM CURRENT USE OF INSULIN (HCC): ICD-10-CM

## 2025-04-09 DIAGNOSIS — E78.2 MIXED HYPERLIPIDEMIA: ICD-10-CM

## 2025-04-09 DIAGNOSIS — M15.0 PRIMARY OSTEOARTHRITIS INVOLVING MULTIPLE JOINTS: ICD-10-CM

## 2025-04-09 LAB
ALBUMIN SERPL-MCNC: 3.8 G/DL (ref 3.5–5)
ALBUMIN/GLOB SERPL: 1.1 (ref 1.1–2.2)
ALP SERPL-CCNC: 81 U/L (ref 45–117)
ALT SERPL-CCNC: 26 U/L (ref 12–78)
ANION GAP SERPL CALC-SCNC: 6 MMOL/L (ref 2–12)
AST SERPL-CCNC: 21 U/L (ref 15–37)
BILIRUB SERPL-MCNC: 0.4 MG/DL (ref 0.2–1)
BUN SERPL-MCNC: 21 MG/DL (ref 6–20)
BUN/CREAT SERPL: 19 (ref 12–20)
CALCIUM SERPL-MCNC: 9.4 MG/DL (ref 8.5–10.1)
CHLORIDE SERPL-SCNC: 105 MMOL/L (ref 97–108)
CHOLEST SERPL-MCNC: 180 MG/DL
CK SERPL-CCNC: 159 U/L (ref 26–192)
CO2 SERPL-SCNC: 28 MMOL/L (ref 21–32)
CREAT SERPL-MCNC: 1.1 MG/DL (ref 0.55–1.02)
EST. AVERAGE GLUCOSE BLD GHB EST-MCNC: 146 MG/DL
GLOBULIN SER CALC-MCNC: 3.4 G/DL (ref 2–4)
GLUCOSE SERPL-MCNC: 146 MG/DL (ref 65–100)
HBA1C MFR BLD: 6.7 % (ref 4–5.6)
HDLC SERPL-MCNC: 76 MG/DL
HDLC SERPL: 2.4 (ref 0–5)
LDLC SERPL CALC-MCNC: 66.4 MG/DL (ref 0–100)
POTASSIUM SERPL-SCNC: 5 MMOL/L (ref 3.5–5.1)
PROT SERPL-MCNC: 7.2 G/DL (ref 6.4–8.2)
SODIUM SERPL-SCNC: 139 MMOL/L (ref 136–145)
TRIGL SERPL-MCNC: 188 MG/DL
VLDLC SERPL CALC-MCNC: 37.6 MG/DL

## 2025-04-09 PROCEDURE — G8399 PT W/DXA RESULTS DOCUMENT: HCPCS | Performed by: INTERNAL MEDICINE

## 2025-04-09 PROCEDURE — 3074F SYST BP LT 130 MM HG: CPT | Performed by: INTERNAL MEDICINE

## 2025-04-09 PROCEDURE — 1123F ACP DISCUSS/DSCN MKR DOCD: CPT | Performed by: INTERNAL MEDICINE

## 2025-04-09 PROCEDURE — 1160F RVW MEDS BY RX/DR IN RCRD: CPT | Performed by: INTERNAL MEDICINE

## 2025-04-09 PROCEDURE — G8427 DOCREV CUR MEDS BY ELIG CLIN: HCPCS | Performed by: INTERNAL MEDICINE

## 2025-04-09 PROCEDURE — 99214 OFFICE O/P EST MOD 30 MIN: CPT | Performed by: INTERNAL MEDICINE

## 2025-04-09 PROCEDURE — 1159F MED LIST DOCD IN RCRD: CPT | Performed by: INTERNAL MEDICINE

## 2025-04-09 PROCEDURE — G8419 CALC BMI OUT NRM PARAM NOF/U: HCPCS | Performed by: INTERNAL MEDICINE

## 2025-04-09 PROCEDURE — 3079F DIAST BP 80-89 MM HG: CPT | Performed by: INTERNAL MEDICINE

## 2025-04-09 PROCEDURE — 1036F TOBACCO NON-USER: CPT | Performed by: INTERNAL MEDICINE

## 2025-04-09 PROCEDURE — 3044F HG A1C LEVEL LT 7.0%: CPT | Performed by: INTERNAL MEDICINE

## 2025-04-09 PROCEDURE — 1090F PRES/ABSN URINE INCON ASSESS: CPT | Performed by: INTERNAL MEDICINE

## 2025-04-09 PROCEDURE — 1126F AMNT PAIN NOTED NONE PRSNT: CPT | Performed by: INTERNAL MEDICINE

## 2025-04-09 ASSESSMENT — PATIENT HEALTH QUESTIONNAIRE - PHQ9
SUM OF ALL RESPONSES TO PHQ QUESTIONS 1-9: 0
SUM OF ALL RESPONSES TO PHQ QUESTIONS 1-9: 0
2. FEELING DOWN, DEPRESSED OR HOPELESS: NOT AT ALL
SUM OF ALL RESPONSES TO PHQ QUESTIONS 1-9: 0
SUM OF ALL RESPONSES TO PHQ QUESTIONS 1-9: 0
1. LITTLE INTEREST OR PLEASURE IN DOING THINGS: NOT AT ALL

## 2025-04-09 NOTE — PROGRESS NOTES
Trish Forbes is a 80 y.o. female     Chief Complaint   Patient presents with    Hypertension     3 month f/up    Diabetes    Hyperlipidemia    Hypothyroidism       \"Have you been to the ER, urgent care clinic since your last visit?  Hospitalized since your last visit?\"    Saw Dr. Erickson; colonoscopy done 4-2-2025    “Have you seen or consulted any other health care providers outside of [facility] since your last visit?”    NO

## 2025-04-22 ENCOUNTER — OFFICE VISIT (OUTPATIENT)
Facility: CLINIC | Age: 80
End: 2025-04-22
Payer: MEDICARE

## 2025-04-22 VITALS
SYSTOLIC BLOOD PRESSURE: 123 MMHG | OXYGEN SATURATION: 96 % | HEART RATE: 78 BPM | DIASTOLIC BLOOD PRESSURE: 73 MMHG | RESPIRATION RATE: 15 BRPM | WEIGHT: 183.5 LBS | BODY MASS INDEX: 28.8 KG/M2 | TEMPERATURE: 97.6 F | HEIGHT: 67 IN

## 2025-04-22 DIAGNOSIS — E11.22 CONTROLLED TYPE 2 DIABETES MELLITUS WITH STAGE 2 CHRONIC KIDNEY DISEASE, WITHOUT LONG-TERM CURRENT USE OF INSULIN (HCC): ICD-10-CM

## 2025-04-22 DIAGNOSIS — N18.2 CONTROLLED TYPE 2 DIABETES MELLITUS WITH STAGE 2 CHRONIC KIDNEY DISEASE, WITHOUT LONG-TERM CURRENT USE OF INSULIN (HCC): ICD-10-CM

## 2025-04-22 DIAGNOSIS — R07.81 PLEURITIC CHEST PAIN: Primary | ICD-10-CM

## 2025-04-22 PROCEDURE — 1090F PRES/ABSN URINE INCON ASSESS: CPT | Performed by: INTERNAL MEDICINE

## 2025-04-22 PROCEDURE — G8427 DOCREV CUR MEDS BY ELIG CLIN: HCPCS | Performed by: INTERNAL MEDICINE

## 2025-04-22 PROCEDURE — G8419 CALC BMI OUT NRM PARAM NOF/U: HCPCS | Performed by: INTERNAL MEDICINE

## 2025-04-22 PROCEDURE — 3078F DIAST BP <80 MM HG: CPT | Performed by: INTERNAL MEDICINE

## 2025-04-22 PROCEDURE — 1123F ACP DISCUSS/DSCN MKR DOCD: CPT | Performed by: INTERNAL MEDICINE

## 2025-04-22 PROCEDURE — G8399 PT W/DXA RESULTS DOCUMENT: HCPCS | Performed by: INTERNAL MEDICINE

## 2025-04-22 PROCEDURE — 3074F SYST BP LT 130 MM HG: CPT | Performed by: INTERNAL MEDICINE

## 2025-04-22 PROCEDURE — 1036F TOBACCO NON-USER: CPT | Performed by: INTERNAL MEDICINE

## 2025-04-22 PROCEDURE — 3044F HG A1C LEVEL LT 7.0%: CPT | Performed by: INTERNAL MEDICINE

## 2025-04-22 PROCEDURE — 1125F AMNT PAIN NOTED PAIN PRSNT: CPT | Performed by: INTERNAL MEDICINE

## 2025-04-22 PROCEDURE — 1159F MED LIST DOCD IN RCRD: CPT | Performed by: INTERNAL MEDICINE

## 2025-04-22 PROCEDURE — 99213 OFFICE O/P EST LOW 20 MIN: CPT | Performed by: INTERNAL MEDICINE

## 2025-04-22 RX ORDER — PREDNISONE 5 MG/1
TABLET ORAL
Qty: 1 EACH | Refills: 0 | Status: SHIPPED | OUTPATIENT
Start: 2025-04-22

## 2025-04-22 SDOH — ECONOMIC STABILITY: FOOD INSECURITY: WITHIN THE PAST 12 MONTHS, THE FOOD YOU BOUGHT JUST DIDN'T LAST AND YOU DIDN'T HAVE MONEY TO GET MORE.: NEVER TRUE

## 2025-04-22 SDOH — ECONOMIC STABILITY: FOOD INSECURITY: WITHIN THE PAST 12 MONTHS, YOU WORRIED THAT YOUR FOOD WOULD RUN OUT BEFORE YOU GOT MONEY TO BUY MORE.: NEVER TRUE

## 2025-04-22 ASSESSMENT — PATIENT HEALTH QUESTIONNAIRE - PHQ9
SUM OF ALL RESPONSES TO PHQ QUESTIONS 1-9: 0
1. LITTLE INTEREST OR PLEASURE IN DOING THINGS: NOT AT ALL
2. FEELING DOWN, DEPRESSED OR HOPELESS: NOT AT ALL
SUM OF ALL RESPONSES TO PHQ QUESTIONS 1-9: 0

## 2025-04-22 NOTE — PROGRESS NOTES
Subjective:   Trish Forbes is a 80 y.o. female      Chief Complaint   Patient presents with    Pleurisy     Patient states she had pleurisy 2 months ago.        History of present illness: She presents complaining of right lower lobe pleuritic chest pain which has recurred.  She has had multiple episodes of this with the most recent episode being January of this year.  All of these respond to prednisone Dosepak.  As in the episodes before she has minimal amount of cough and no sputum production.  She notes no fevers or chills.  There is no shortness of breath but it just hurts in the right posterior lower chest wall area to take a deep breath.  She has noted no other associated complaints.  She has had multiple chest x-rays over the years the most recent which was August of last year showing some atelectatic change in the right lower lobe.  On her last episode of pleurisy in January we discussed doing a CT scan and she wanted to defer until it recurred 1 now it has recurred so I think we need to do a CT scan    Patient Active Problem List   Diagnosis    Alcohol screening    Post-menopausal    Neck pain    PAF (paroxysmal atrial fibrillation) ([facility])    Hammer toe of second toe of left foot    Primary osteoarthritis involving multiple joints    SBO (small bowel obstruction) (HCC)    Essential hypertension    Controlled type 2 diabetes mellitus with stage 2 chronic kidney disease, without long-term current use of insulin (HCC)    Stage 2 chronic kidney disease    Medicare annual wellness visit, subsequent    Acquired hypothyroidism    Crohn disease (HCC)    Acute bronchitis    Acute non-recurrent maxillary sinusitis    Mixed hyperlipidemia    Edema    Colon cancer screening    Vitamin D deficiency    Presbyopia      Past Medical History:   Diagnosis Date    Acquired hypothyroidism 7/26/2017    CKD (chronic kidney disease) 7/26/2017    Crohn's disease (HCC)     Diabetes (HCC)     type 2    GERD (gastroesophageal

## 2025-04-22 NOTE — PROGRESS NOTES
Trish Forbes is a 80 y.o. female  Chief Complaint   Patient presents with    Pleurisy     Vitals:    04/22/25 1351   BP: 123/73   BP Site: Left Upper Arm   Patient Position: Sitting   Pulse: 78   Resp: 15   Temp: 97.6 °F (36.4 °C)   SpO2: 96%   Weight: 83.2 kg (183 lb 8 oz)   Height: 1.702 m (5' 7\")         Health Maintenance Due   Topic Date Due    DTaP/Tdap/Td vaccine (1 - Tdap) Never done    Respiratory Syncytial Virus (RSV) Pregnant or age 60 yrs+ (1 - 1-dose 75+ series) Never done    COVID-19 Vaccine (6 - 2024-25 season) 09/01/2024         \"Have you been to the ER, urgent care clinic since your last visit?  Hospitalized since your last visit?\"    NO    “Have you seen or consulted any other health care providers outside of [facility] since your last visit?”    NO

## 2025-04-30 ENCOUNTER — HOSPITAL ENCOUNTER (OUTPATIENT)
Facility: HOSPITAL | Age: 80
Discharge: HOME | End: 2025-05-03
Attending: INTERNAL MEDICINE
Payer: MEDICARE

## 2025-04-30 DIAGNOSIS — R07.81 PLEURITIC CHEST PAIN: ICD-10-CM

## 2025-04-30 PROCEDURE — 71260 CT THORAX DX C+: CPT

## 2025-04-30 PROCEDURE — 6360000004 HC RX CONTRAST MEDICATION: Performed by: INTERNAL MEDICINE

## 2025-04-30 RX ORDER — IOPAMIDOL 755 MG/ML
100 INJECTION, SOLUTION INTRAVASCULAR
Status: COMPLETED | OUTPATIENT
Start: 2025-04-30 | End: 2025-04-30

## 2025-04-30 RX ADMIN — IOPAMIDOL 100 ML: 755 INJECTION, SOLUTION INTRAVENOUS at 08:42

## 2025-05-02 ENCOUNTER — OFFICE VISIT (OUTPATIENT)
Facility: CLINIC | Age: 80
End: 2025-05-02

## 2025-05-02 VITALS
OXYGEN SATURATION: 94 % | TEMPERATURE: 98.1 F | WEIGHT: 180.5 LBS | DIASTOLIC BLOOD PRESSURE: 76 MMHG | SYSTOLIC BLOOD PRESSURE: 126 MMHG | BODY MASS INDEX: 28.27 KG/M2 | HEART RATE: 90 BPM

## 2025-05-02 DIAGNOSIS — R07.81 PLEURITIC CHEST PAIN: Primary | ICD-10-CM

## 2025-05-02 RX ORDER — PREDNISONE 10 MG/1
TABLET ORAL
Qty: 1 EACH | Refills: 0 | Status: SHIPPED | OUTPATIENT
Start: 2025-05-02

## 2025-05-02 NOTE — PROGRESS NOTES
Subjective:   Trish Forbes is a 80 y.o. female      Chief Complaint   Patient presents with    Pleurisy     Pt comes in today for a follow up after ct scan. Pt states that she still has pain. Pt rates the pain at 8/10        History of present illness: She presents complaining of continued pain on the right side of her chest which did get a little better when she took the prednisone 5 mg 6-day Dosepak but did not go away like it has before.  She did have a CT scan that I was actually done 3 days ago however I pulled up the CT scan yesterday as well as currently and is still pending and unable for me to see the results.  She has a little cough but no congestion.  She notes that it hurts to take a deep breath in the right lower posterior chest wall.  She notes no other complaints on complete review of systems.    Patient Active Problem List   Diagnosis    Alcohol screening    Post-menopausal    Neck pain    PAF (paroxysmal atrial fibrillation) ([facility])    Hammer toe of second toe of left foot    Primary osteoarthritis involving multiple joints    SBO (small bowel obstruction) ([facility])    Essential hypertension    Controlled type 2 diabetes mellitus with stage 2 chronic kidney disease, without long-term current use of insulin (HCC)    Stage 2 chronic kidney disease    Medicare annual wellness visit, subsequent    Acquired hypothyroidism    Crohn disease (HCC)    Acute bronchitis    Acute non-recurrent maxillary sinusitis    Mixed hyperlipidemia    Edema    Colon cancer screening    Vitamin D deficiency    Presbyopia    Pleuritic chest pain      Past Medical History:   Diagnosis Date    Acquired hypothyroidism 7/26/2017    CKD (chronic kidney disease) 7/26/2017    Crohn's disease (HCC)     Diabetes (HCC)     type 2    GERD (gastroesophageal reflux disease) 12/1995    Hypertension     On statin therapy 7/26/2017    Pleurisy 07/17/2019    Post-menopausal 7/26/2017      Allergies   Allergen Reactions    Lisinopril Cough

## 2025-05-04 ENCOUNTER — RESULTS FOLLOW-UP (OUTPATIENT)
Facility: CLINIC | Age: 80
End: 2025-05-04

## 2025-05-04 DIAGNOSIS — K86.89 DILATED PANCREATIC DUCT: Primary | ICD-10-CM

## 2025-05-14 RX ORDER — LEVOTHYROXINE SODIUM 50 UG/1
50 TABLET ORAL DAILY
Qty: 90 TABLET | Refills: 1 | Status: SHIPPED | OUTPATIENT
Start: 2025-05-14 | End: 2025-05-15

## 2025-05-14 NOTE — TELEPHONE ENCOUNTER
PCP: Umang Arteaga MD    Last appt: 5/2/2025    Future Appointments   Date Time Provider Department Center   5/27/2025 10:15 AM [facility] MRI 2 [facility]   7/9/2025  8:50 AM Umang Arteaga MD [facility]       Requested Prescriptions     Pending Prescriptions Disp Refills    levothyroxine (SYNTHROID) 50 MCG tablet [Pharmacy Med Name: levothyroxine 50 mcg tablet] 90 tablet 1     Sig: TAKE 1 TABLET BY MOUTH EVERY DAY

## 2025-05-14 NOTE — TELEPHONE ENCOUNTER
Approved  PA Detail   Prior authorization approved  Payer: [employer] Patient's Payer Case ID: [id number]    [phone]  Note from payer: CaseId:[id number];Status:Approved;Review Type:Prior Auth;Coverage Start Date:04/14/2025;Coverage End Date:05/14/2026;  Approval Details    Authorized from April 14, 2025 to May 14, 2026  Electronic appeal: Not supported  Prior auth initiated by: [facility] - [place], [place] - [address] - P [phone] - F [phone]    [phone]  View History  Medication Being Authorized    valsartan (DIOVAN) 160 MG tablet  Take 1 tablet by mouth daily  Dispense: 90 tablet Refills: 1

## 2025-05-15 RX ORDER — LEVOTHYROXINE SODIUM 50 UG/1
50 TABLET ORAL DAILY
Qty: 90 TABLET | Refills: 3 | Status: SHIPPED | OUTPATIENT
Start: 2025-05-15

## 2025-05-27 ENCOUNTER — HOSPITAL ENCOUNTER (OUTPATIENT)
Facility: HOSPITAL | Age: 80
Discharge: HOME | End: 2025-05-30
Attending: INTERNAL MEDICINE
Payer: MEDICARE

## 2025-05-27 DIAGNOSIS — K86.89 DILATED PANCREATIC DUCT: ICD-10-CM

## 2025-05-27 DIAGNOSIS — N18.2 CONTROLLED TYPE 2 DIABETES MELLITUS WITH STAGE 2 CHRONIC KIDNEY DISEASE, WITHOUT LONG-TERM CURRENT USE OF INSULIN (HCC): ICD-10-CM

## 2025-05-27 DIAGNOSIS — E11.22 CONTROLLED TYPE 2 DIABETES MELLITUS WITH STAGE 2 CHRONIC KIDNEY DISEASE, WITHOUT LONG-TERM CURRENT USE OF INSULIN (HCC): ICD-10-CM

## 2025-05-27 PROCEDURE — 74183 MRI ABD W/O CNTR FLWD CNTR: CPT

## 2025-05-27 PROCEDURE — 6360000004 HC RX CONTRAST MEDICATION: Performed by: INTERNAL MEDICINE

## 2025-05-27 PROCEDURE — A9579 GAD-BASE MR CONTRAST NOS,1ML: HCPCS | Performed by: INTERNAL MEDICINE

## 2025-05-27 RX ORDER — NATEGLINIDE 120 MG/1
TABLET ORAL
Qty: 270 TABLET | Refills: 0 | Status: SHIPPED | OUTPATIENT
Start: 2025-05-27

## 2025-05-27 RX ADMIN — GADOTERIDOL 17 ML: 279.3 INJECTION, SOLUTION INTRAVENOUS at 10:49

## 2025-05-27 NOTE — TELEPHONE ENCOUNTER
PCP: Umang Arteaga MD    Last appt: Visit date not found    Future Appointments   Date Time Provider Department Center   7/9/2025  8:50 AM Umang Arteaga MD [facility] DEP       Requested Prescriptions     Pending Prescriptions Disp Refills    nateglinide (STARLIX) 120 MG tablet 270 tablet 0     Sig: Take 1 tablet by mouth three times a day before meals.

## 2025-06-03 PROBLEM — Q45.3 PANCREATIC ABNORMALITY: Status: ACTIVE | Noted: 2025-06-03

## 2025-06-04 ENCOUNTER — RESULTS FOLLOW-UP (OUTPATIENT)
Facility: CLINIC | Age: 80
End: 2025-06-04

## 2025-06-04 ENCOUNTER — OFFICE VISIT (OUTPATIENT)
Facility: CLINIC | Age: 80
End: 2025-06-04

## 2025-06-04 VITALS
OXYGEN SATURATION: 92 % | HEART RATE: 102 BPM | BODY MASS INDEX: 29.29 KG/M2 | SYSTOLIC BLOOD PRESSURE: 121 MMHG | DIASTOLIC BLOOD PRESSURE: 71 MMHG | TEMPERATURE: 98.6 F | WEIGHT: 187 LBS

## 2025-06-04 DIAGNOSIS — R07.81 PLEURITIC CHEST PAIN: ICD-10-CM

## 2025-06-04 DIAGNOSIS — Q45.3 PANCREATIC ABNORMALITY: Primary | ICD-10-CM

## 2025-06-04 NOTE — PROGRESS NOTES
Have you been to the ER, urgent care clinic since your last visit?  Hospitalized since your last visit?   NO    Have you seen or consulted any other health care providers outside our system since your last visit?   NO            
Heart Failure Mother     Cancer Father         stomach       OBJECTIVE:     /71   Pulse (!) 102   Temp 98.6 °F (37 °C) (Oral)   Wt 84.8 kg (187 lb)   SpO2 92%   BMI 29.29 kg/m²   CONSTITUTIONAL:   well nourished, appears age appropriate  EYES: sclera anicteric, PERRL, EOMI  ENMT:nares clear, moist mucous membranes, pharynx clear  NECK: supple. Thyroid normal, No JVD or bruits  RESPIRATORY: Chest: clear to ascultation and percussion, normal inspiratory effort  CARDIOVASCULAR: Heart: regular rate and rhythm no murmurs, rubs or gallops, PMI not displaced, No thrills, no peripheral edema  GASTROINTESTINAL: Abdomen: non distended, soft, non tender, bowel sounds normal  HEMATOLOGIC: no purpura, petechiae or bruising  LYMPHATIC: No lymph node enlargemant  MUSCULOSKELETAL: Extremities: no active synovitis, pulse 1+   INTEGUMENT: No unusual rashes or suspicious skin lesions noted. Nails appear normal.  PERIPHERAL VASCULAR: normal pulses femoral, PT and DP  NEUROLOGIC: non-focal exam, A & O X 3  PSYCHIATRIC:, appropriate affect     ASSESSMENT:   1. Pancreatic abnormality    2. Pleuritic chest pain      Impression  1.  Pancreatic abnormality which could be related to a pancreatic tumor certainly needs to be evaluated further.  I have discussed this in detail with her.  I have also discussed this in phone consultation with Dr. Connor Schumacher while the patient was here today.  He will set her up for endoscopic ultrasound in the near future.  2.  Pleuritic chest pain resolved  30 minutes spent in direct care of this patient today including consultation and phone with gastroenterology.  Greater than 50% of this time spent in counseling and coordination of care.    PLAN:  1. Pancreatic abnormality  2. Pleuritic chest pain          ATTENTION:   This medical record was transcribed using an electronic medical records system.  Although proofread, it may and can contain electronic and spelling errors.  Other human spelling and

## 2025-06-30 RX ORDER — HYDROCHLOROTHIAZIDE 25 MG/1
25 TABLET ORAL DAILY
Qty: 90 TABLET | Refills: 3 | Status: SHIPPED | OUTPATIENT
Start: 2025-06-30

## 2025-07-08 NOTE — PROGRESS NOTES
This is a Subsequent Medicare Annual Wellness Visit providing Personalized Prevention Plan Services (PPPS) (Performed 12 months after initial AWV and PPPS )    I have reviewed the patient's medical history in detail and updated the computerized patient record.  She presents today for Medicare subsequent annual wellness examination and screening questionnaire.    She is also in follow-up of her multiple medical problems include hypertension, diabetes, hyperlipidemia, paroxysmal atrial fibrillation, history of small bowel obstruction, DJD, CKD stage II, vitamin D deficiency, and intermittent problems with pleurisy for which she has again developed some problems with some pleurisy in the right side.  Other than the pleuritic chest pain she notes no chest pain, palpitations, PND, orthopnea or other cardiac or respiratory complaints.  She has no current GI or  complaints.  She has no headaches, dizziness or new neurologic complaints.  She has no new arthritic complaints and there are no other complaints on complete review of systems.    History     Past Medical History:   Diagnosis Date    Acquired hypothyroidism 7/26/2017    CKD (chronic kidney disease) 7/26/2017    Crohn's disease (HCC)     Diabetes (HCC)     type 2    GERD (gastroesophageal reflux disease) 12/1995    Hypertension     On statin therapy 7/26/2017    Pleurisy 07/17/2019    Post-menopausal 7/26/2017      Past Surgical History:   Procedure Laterality Date    BREAST BIOPSY Right     over 30 years  negative    BREAST SURGERY      cyst removed right    COLONOSCOPY N/A 05/01/2018    COLONOSCOPY performed by Jamari Erickson Jr., MD at [facility] ENDOSCOPY    COLONOSCOPY N/A 10/05/2021    COLONOSCOPY performed by Jamari Erickson Jr., MD at [facility] ENDOSCOPY    COLONOSCOPY N/A 04/02/2025    COLONOSCOPY performed by Jamari Erickson Jr., MD at [facility] ENDOSCOPY    COLONOSCOPY  04/02/2025    Dr. Erickson    COLONOSCOPY W/BIOPSY SINGLE/MULTIPLE

## 2025-07-09 ENCOUNTER — OFFICE VISIT (OUTPATIENT)
Facility: CLINIC | Age: 80
End: 2025-07-09

## 2025-07-09 VITALS
WEIGHT: 183.8 LBS | OXYGEN SATURATION: 96 % | SYSTOLIC BLOOD PRESSURE: 118 MMHG | BODY MASS INDEX: 28.85 KG/M2 | TEMPERATURE: 98.2 F | HEIGHT: 67 IN | DIASTOLIC BLOOD PRESSURE: 71 MMHG | RESPIRATION RATE: 13 BRPM | HEART RATE: 87 BPM

## 2025-07-09 DIAGNOSIS — E55.9 VITAMIN D DEFICIENCY: ICD-10-CM

## 2025-07-09 DIAGNOSIS — N18.2 CONTROLLED TYPE 2 DIABETES MELLITUS WITH STAGE 2 CHRONIC KIDNEY DISEASE, WITHOUT LONG-TERM CURRENT USE OF INSULIN (HCC): ICD-10-CM

## 2025-07-09 DIAGNOSIS — M15.0 PRIMARY OSTEOARTHRITIS INVOLVING MULTIPLE JOINTS: ICD-10-CM

## 2025-07-09 DIAGNOSIS — I10 ESSENTIAL HYPERTENSION: Primary | ICD-10-CM

## 2025-07-09 DIAGNOSIS — R07.81 PLEURITIC CHEST PAIN: ICD-10-CM

## 2025-07-09 DIAGNOSIS — E03.9 ACQUIRED HYPOTHYROIDISM: ICD-10-CM

## 2025-07-09 DIAGNOSIS — I48.0 PAF (PAROXYSMAL ATRIAL FIBRILLATION) (HCC): ICD-10-CM

## 2025-07-09 DIAGNOSIS — E11.22 CONTROLLED TYPE 2 DIABETES MELLITUS WITH STAGE 2 CHRONIC KIDNEY DISEASE, WITHOUT LONG-TERM CURRENT USE OF INSULIN (HCC): ICD-10-CM

## 2025-07-09 DIAGNOSIS — Z00.00 MEDICARE ANNUAL WELLNESS VISIT, SUBSEQUENT: ICD-10-CM

## 2025-07-09 DIAGNOSIS — K50.90 CROHN'S DISEASE WITHOUT COMPLICATION, UNSPECIFIED GASTROINTESTINAL TRACT LOCATION (HCC): ICD-10-CM

## 2025-07-09 DIAGNOSIS — Z13.39 ALCOHOL SCREENING: ICD-10-CM

## 2025-07-09 DIAGNOSIS — E78.2 MIXED HYPERLIPIDEMIA: ICD-10-CM

## 2025-07-09 RX ORDER — PREDNISONE 5 MG/1
TABLET ORAL
Qty: 1 EACH | Refills: 0 | Status: SHIPPED | OUTPATIENT
Start: 2025-07-09

## 2025-07-09 ASSESSMENT — PATIENT HEALTH QUESTIONNAIRE - PHQ9
SUM OF ALL RESPONSES TO PHQ QUESTIONS 1-9: 0
SUM OF ALL RESPONSES TO PHQ QUESTIONS 1-9: 0
1. LITTLE INTEREST OR PLEASURE IN DOING THINGS: NOT AT ALL
SUM OF ALL RESPONSES TO PHQ QUESTIONS 1-9: 0
SUM OF ALL RESPONSES TO PHQ QUESTIONS 1-9: 0
2. FEELING DOWN, DEPRESSED OR HOPELESS: NOT AT ALL

## 2025-07-09 NOTE — PROGRESS NOTES
Trish Forbes is a 80 y.o. female    Chief Complaint   Patient presents with    3 Month Follow-Up     Vitals:    07/09/25 0845   BP: 118/71   BP Site: Left Upper Arm   Patient Position: Sitting   Pulse: 87   Resp: 13   Temp: 98.2 °F (36.8 °C)   SpO2: 96%   Weight: 83.4 kg (183 lb 12.8 oz)   Height: 1.702 m (5' 7\")         Health Maintenance Due   Topic Date Due    DTaP/Tdap/Td vaccine (1 - Tdap) Never done    Respiratory Syncytial Virus (RSV) Pregnant or age 60 yrs+ (1 - 1-dose 75+ series) Never done    COVID-19 Vaccine (6 - 2024-25 season) 09/01/2024    Annual Wellness Visit (Medicare)  06/11/2025    Diabetic Alb to Cr ratio (uACR) test  06/11/2025         \"Have you been to the ER, urgent care clinic since your last visit?  Hospitalized since your last visit?\"    NO    “Have you seen or consulted any other health care providers outside of [facility] since your last visit?”    NO

## 2025-07-10 LAB
APPEARANCE UR: CLEAR
BACTERIA URNS QL MICRO: NEGATIVE /HPF
BILIRUB UR QL: NEGATIVE
COLOR UR: NORMAL
CREAT UR-MCNC: 110 MG/DL
EPITH CASTS URNS QL MICRO: NORMAL /LPF
GLUCOSE UR STRIP.AUTO-MCNC: NEGATIVE MG/DL
HGB UR QL STRIP: NEGATIVE
HYALINE CASTS URNS QL MICRO: NORMAL /LPF (ref 0–5)
KETONES UR QL STRIP.AUTO: NEGATIVE MG/DL
LEUKOCYTE ESTERASE UR QL STRIP.AUTO: NEGATIVE
MICROALBUMIN UR-MCNC: 2.08 MG/DL
MICROALBUMIN/CREAT UR-RTO: 19 MG/G (ref 0–30)
NITRITE UR QL STRIP.AUTO: NEGATIVE
PH UR STRIP: 5.5 (ref 5–8)
PROT UR STRIP-MCNC: NEGATIVE MG/DL
RBC #/AREA URNS HPF: NORMAL /HPF (ref 0–5)
SP GR UR REFRACTOMETRY: 1.02 (ref 1–1.03)
SPECIMEN HOLD: NORMAL
UROBILINOGEN UR QL STRIP.AUTO: 0.2 EU/DL (ref 0.2–1)
WBC URNS QL MICRO: NORMAL /HPF (ref 0–4)

## 2025-07-11 LAB
25(OH)D3 SERPL-MCNC: 51.5 NG/ML (ref 30–100)
ALBUMIN SERPL-MCNC: 3.6 G/DL (ref 3.5–5)
ALBUMIN/GLOB SERPL: 1.2 (ref 1.1–2.2)
ALP SERPL-CCNC: 64 U/L (ref 45–117)
ALT SERPL-CCNC: 25 U/L (ref 12–78)
ANION GAP SERPL CALC-SCNC: 6 MMOL/L (ref 2–12)
AST SERPL-CCNC: 19 U/L (ref 15–37)
BASOPHILS # BLD: 0.07 K/UL (ref 0–0.1)
BASOPHILS NFR BLD: 0.9 % (ref 0–1)
BILIRUB SERPL-MCNC: 0.5 MG/DL (ref 0.2–1)
BUN SERPL-MCNC: 22 MG/DL (ref 6–20)
BUN/CREAT SERPL: 20 (ref 12–20)
CALCIUM SERPL-MCNC: 9.3 MG/DL (ref 8.5–10.1)
CHLORIDE SERPL-SCNC: 110 MMOL/L (ref 97–108)
CHOLEST SERPL-MCNC: 180 MG/DL
CK SERPL-CCNC: 132 U/L (ref 26–192)
CO2 SERPL-SCNC: 23 MMOL/L (ref 21–32)
CREAT SERPL-MCNC: 1.11 MG/DL (ref 0.55–1.02)
DIFFERENTIAL METHOD BLD: ABNORMAL
EOSINOPHIL # BLD: 0.86 K/UL (ref 0–0.4)
EOSINOPHIL NFR BLD: 11.7 % (ref 0–7)
ERYTHROCYTE [DISTWIDTH] IN BLOOD BY AUTOMATED COUNT: 14.2 % (ref 11.5–14.5)
EST. AVERAGE GLUCOSE BLD GHB EST-MCNC: 131 MG/DL
GLOBULIN SER CALC-MCNC: 3 G/DL (ref 2–4)
GLUCOSE SERPL-MCNC: 121 MG/DL (ref 65–100)
HBA1C MFR BLD: 6.2 % (ref 4–5.6)
HCT VFR BLD AUTO: 33.2 % (ref 35–47)
HDLC SERPL-MCNC: 65 MG/DL
HDLC SERPL: 2.8 (ref 0–5)
HGB BLD-MCNC: 10.1 G/DL (ref 11.5–16)
IMM GRANULOCYTES # BLD AUTO: 0.01 K/UL (ref 0–0.04)
IMM GRANULOCYTES NFR BLD AUTO: 0.1 % (ref 0–0.5)
LDLC SERPL CALC-MCNC: 71.6 MG/DL (ref 0–100)
LYMPHOCYTES # BLD: 2.67 K/UL (ref 0.8–3.5)
LYMPHOCYTES NFR BLD: 36.2 % (ref 12–49)
MCH RBC QN AUTO: 29.4 PG (ref 26–34)
MCHC RBC AUTO-ENTMCNC: 30.4 G/DL (ref 30–36.5)
MCV RBC AUTO: 96.8 FL (ref 80–99)
MONOCYTES # BLD: 0.61 K/UL (ref 0–1)
MONOCYTES NFR BLD: 8.3 % (ref 5–13)
NEUTS SEG # BLD: 3.15 K/UL (ref 1.8–8)
NEUTS SEG NFR BLD: 42.8 % (ref 32–75)
NRBC # BLD: 0 K/UL (ref 0–0.01)
NRBC BLD-RTO: 0 PER 100 WBC
PLATELET # BLD AUTO: 272 K/UL (ref 150–400)
PMV BLD AUTO: 9.2 FL (ref 8.9–12.9)
POTASSIUM SERPL-SCNC: 4.8 MMOL/L (ref 3.5–5.1)
PROT SERPL-MCNC: 6.6 G/DL (ref 6.4–8.2)
RBC # BLD AUTO: 3.43 M/UL (ref 3.8–5.2)
SODIUM SERPL-SCNC: 139 MMOL/L (ref 136–145)
T4 FREE SERPL-MCNC: 1.2 NG/DL (ref 0.8–1.5)
TRIGL SERPL-MCNC: 217 MG/DL
TSH SERPL DL<=0.05 MIU/L-ACNC: 2.32 UIU/ML (ref 0.36–3.74)
VLDLC SERPL CALC-MCNC: 43.4 MG/DL
WBC # BLD AUTO: 7.4 K/UL (ref 3.6–11)

## 2025-07-16 ENCOUNTER — LAB (OUTPATIENT)
Facility: CLINIC | Age: 80
End: 2025-07-16
Payer: MEDICARE

## 2025-07-16 DIAGNOSIS — D64.9 ANEMIA, UNSPECIFIED TYPE: Primary | ICD-10-CM

## 2025-07-16 PROCEDURE — 99211 OFF/OP EST MAY X REQ PHY/QHP: CPT | Performed by: INTERNAL MEDICINE

## 2025-07-17 LAB
BASOPHILS # BLD: 0.09 K/UL (ref 0–0.1)
BASOPHILS NFR BLD: 1 % (ref 0–1)
DIFFERENTIAL METHOD BLD: ABNORMAL
EOSINOPHIL # BLD: 1.42 K/UL (ref 0–0.4)
EOSINOPHIL NFR BLD: 16 % (ref 0–7)
ERYTHROCYTE [DISTWIDTH] IN BLOOD BY AUTOMATED COUNT: 14 % (ref 11.5–14.5)
FERRITIN SERPL-MCNC: 11 NG/ML (ref 8–252)
FOLATE SERPL-MCNC: 43.1 NG/ML (ref 5–21)
HCT VFR BLD AUTO: 36.4 % (ref 35–47)
HGB BLD-MCNC: 11.3 G/DL (ref 11.5–16)
IMM GRANULOCYTES # BLD AUTO: 0 K/UL
IMM GRANULOCYTES NFR BLD AUTO: 0 %
IRON SATN MFR SERPL: 14 % (ref 20–50)
IRON SERPL-MCNC: 81 UG/DL (ref 35–150)
LYMPHOCYTES # BLD: 2.76 K/UL (ref 0.8–3.5)
LYMPHOCYTES NFR BLD: 31 % (ref 12–49)
MCH RBC QN AUTO: 29.5 PG (ref 26–34)
MCHC RBC AUTO-ENTMCNC: 31 G/DL (ref 30–36.5)
MCV RBC AUTO: 95 FL (ref 80–99)
MONOCYTES # BLD: 0.62 K/UL (ref 0–1)
MONOCYTES NFR BLD: 7 % (ref 5–13)
NEUTS BAND NFR BLD MANUAL: 1 % (ref 0–6)
NEUTS SEG # BLD: 4.01 K/UL (ref 1.8–8)
NEUTS SEG NFR BLD: 44 % (ref 32–75)
NRBC # BLD: 0 K/UL (ref 0–0.01)
NRBC BLD-RTO: 0 PER 100 WBC
PLATELET # BLD AUTO: 325 K/UL (ref 150–400)
PMV BLD AUTO: 9.6 FL (ref 8.9–12.9)
RBC # BLD AUTO: 3.83 M/UL (ref 3.8–5.2)
RBC MORPH BLD: ABNORMAL
TIBC SERPL-MCNC: 574 UG/DL (ref 250–450)
VIT B12 SERPL-MCNC: 299 PG/ML (ref 193–986)
WBC # BLD AUTO: 8.9 K/UL (ref 3.6–11)

## 2025-07-21 ENCOUNTER — LAB (OUTPATIENT)
Facility: CLINIC | Age: 80
End: 2025-07-21
Payer: MEDICARE

## 2025-07-21 DIAGNOSIS — D72.10 EOSINOPHILIA, UNSPECIFIED TYPE: Primary | ICD-10-CM

## 2025-07-21 PROCEDURE — 99211 OFF/OP EST MAY X REQ PHY/QHP: CPT | Performed by: INTERNAL MEDICINE

## 2025-07-22 LAB — PERIPHERAL SMEAR, MD REVIEW: NORMAL

## 2025-07-29 RX ORDER — MULTIVIT WITH MINERALS/LUTEIN
250 TABLET ORAL 2 TIMES DAILY
COMMUNITY

## 2025-07-30 ENCOUNTER — ANESTHESIA EVENT (OUTPATIENT)
Facility: HOSPITAL | Age: 80
End: 2025-07-30
Payer: MEDICARE

## 2025-07-30 ENCOUNTER — HOSPITAL ENCOUNTER (OUTPATIENT)
Facility: HOSPITAL | Age: 80
Setting detail: OUTPATIENT SURGERY
Discharge: HOME | End: 2025-07-30
Attending: INTERNAL MEDICINE | Admitting: INTERNAL MEDICINE
Payer: MEDICARE

## 2025-07-30 ENCOUNTER — ANESTHESIA (OUTPATIENT)
Facility: HOSPITAL | Age: 80
End: 2025-07-30
Payer: MEDICARE

## 2025-07-30 VITALS
SYSTOLIC BLOOD PRESSURE: 121 MMHG | RESPIRATION RATE: 17 BRPM | BODY MASS INDEX: 29.18 KG/M2 | TEMPERATURE: 97.2 F | HEIGHT: 66 IN | OXYGEN SATURATION: 98 % | WEIGHT: 181.6 LBS | HEART RATE: 95 BPM | DIASTOLIC BLOOD PRESSURE: 59 MMHG

## 2025-07-30 LAB
AMYLASE FLD-CCNC: 3509 U/L
CEA FLD-MCNC: NORMAL NG/ML
SPECIMEN SOURCE FLD: NORMAL
SPECIMEN SOURCE FLD: NORMAL

## 2025-07-30 PROCEDURE — 88112 CYTOPATH CELL ENHANCE TECH: CPT

## 2025-07-30 PROCEDURE — 3600007512: Performed by: INTERNAL MEDICINE

## 2025-07-30 PROCEDURE — 3600007502: Performed by: INTERNAL MEDICINE

## 2025-07-30 PROCEDURE — 3700000001 HC ADD 15 MINUTES (ANESTHESIA): Performed by: INTERNAL MEDICINE

## 2025-07-30 PROCEDURE — 82150 ASSAY OF AMYLASE: CPT

## 2025-07-30 PROCEDURE — 7100000011 HC PHASE II RECOVERY - ADDTL 15 MIN: Performed by: INTERNAL MEDICINE

## 2025-07-30 PROCEDURE — 2580000003 HC RX 258: Performed by: INTERNAL MEDICINE

## 2025-07-30 PROCEDURE — 3700000000 HC ANESTHESIA ATTENDED CARE: Performed by: INTERNAL MEDICINE

## 2025-07-30 PROCEDURE — 2709999900 HC NON-CHARGEABLE SUPPLY: Performed by: INTERNAL MEDICINE

## 2025-07-30 PROCEDURE — 6360000002 HC RX W HCPCS

## 2025-07-30 PROCEDURE — 88313 SPECIAL STAINS GROUP 2: CPT

## 2025-07-30 PROCEDURE — 82378 CARCINOEMBRYONIC ANTIGEN: CPT

## 2025-07-30 PROCEDURE — 88305 TISSUE EXAM BY PATHOLOGIST: CPT

## 2025-07-30 PROCEDURE — 6360000002 HC RX W HCPCS: Performed by: INTERNAL MEDICINE

## 2025-07-30 PROCEDURE — 7100000010 HC PHASE II RECOVERY - FIRST 15 MIN: Performed by: INTERNAL MEDICINE

## 2025-07-30 RX ORDER — SODIUM CHLORIDE 0.9 % (FLUSH) 0.9 %
5-40 SYRINGE (ML) INJECTION PRN
Status: DISCONTINUED | OUTPATIENT
Start: 2025-07-30 | End: 2025-07-30 | Stop reason: HOSPADM

## 2025-07-30 RX ORDER — SODIUM CHLORIDE 9 MG/ML
INJECTION, SOLUTION INTRAVENOUS PRN
Status: DISCONTINUED | OUTPATIENT
Start: 2025-07-30 | End: 2025-07-30 | Stop reason: HOSPADM

## 2025-07-30 RX ORDER — PHENYLEPHRINE HCL IN 0.9% NACL 0.4MG/10ML
SYRINGE (ML) INTRAVENOUS
Status: DISCONTINUED | OUTPATIENT
Start: 2025-07-30 | End: 2025-07-30 | Stop reason: SDUPTHER

## 2025-07-30 RX ORDER — LEVOFLOXACIN 500 MG/1
500 TABLET, FILM COATED ORAL DAILY
Qty: 3 TABLET | Refills: 0 | Status: SHIPPED | OUTPATIENT
Start: 2025-07-31 | End: 2025-08-03

## 2025-07-30 RX ORDER — LEVOFLOXACIN 5 MG/ML
500 INJECTION, SOLUTION INTRAVENOUS
Status: CANCELLED | OUTPATIENT
Start: 2025-07-30

## 2025-07-30 RX ORDER — SODIUM CHLORIDE 0.9 % (FLUSH) 0.9 %
5-40 SYRINGE (ML) INJECTION EVERY 12 HOURS SCHEDULED
Status: DISCONTINUED | OUTPATIENT
Start: 2025-07-30 | End: 2025-07-30 | Stop reason: HOSPADM

## 2025-07-30 RX ORDER — LEVOFLOXACIN 5 MG/ML
500 INJECTION, SOLUTION INTRAVENOUS ONCE
Status: COMPLETED | OUTPATIENT
Start: 2025-07-30 | End: 2025-07-30

## 2025-07-30 RX ADMIN — Medication 200 MCG: at 13:16

## 2025-07-30 RX ADMIN — LEVOFLOXACIN 500 MG: 5 INJECTION, SOLUTION INTRAVENOUS at 12:02

## 2025-07-30 RX ADMIN — Medication 80 MCG: at 13:04

## 2025-07-30 RX ADMIN — Medication 80 MCG: at 12:51

## 2025-07-30 RX ADMIN — PROPOFOL 75 MCG/KG/MIN: 10 INJECTION, EMULSION INTRAVENOUS at 12:14

## 2025-07-30 RX ADMIN — SODIUM CHLORIDE: 9 INJECTION, SOLUTION INTRAVENOUS at 12:05

## 2025-07-30 RX ADMIN — Medication 40 MCG: at 12:23

## 2025-07-30 RX ADMIN — Medication 80 MCG: at 12:37

## 2025-07-30 RX ADMIN — Medication 80 MCG: at 12:40

## 2025-07-30 RX ADMIN — Medication 240 MCG: at 13:09

## 2025-07-30 RX ADMIN — Medication 40 MCG: at 12:28

## 2025-07-30 RX ADMIN — Medication 80 MCG: at 12:32

## 2025-07-30 RX ADMIN — LIDOCAINE HYDROCHLORIDE 100 MG: 20 INJECTION, SOLUTION EPIDURAL; INFILTRATION; INTRACAUDAL; PERINEURAL at 12:13

## 2025-07-30 RX ADMIN — Medication 80 MCG: at 12:43

## 2025-07-30 RX ADMIN — Medication 80 MCG: at 12:31

## 2025-07-30 RX ADMIN — PROPOFOL 100 MG: 10 INJECTION, EMULSION INTRAVENOUS at 12:13

## 2025-07-30 RX ADMIN — Medication 100 MCG: at 12:58

## 2025-07-30 RX ADMIN — Medication 80 MCG: at 12:45

## 2025-07-30 ASSESSMENT — PAIN - FUNCTIONAL ASSESSMENT: PAIN_FUNCTIONAL_ASSESSMENT: NONE - DENIES PAIN

## 2025-07-30 NOTE — PROGRESS NOTES
ARRIVAL INFORMATION:  Verified patient name and date of birth, scheduled procedure, and informed consent.     : Nathaly (friend) contact number: [phone]    Physician and staff can share information with the .     Receive texts: Yes    Belongings with patient include:  Clothing,Glasses    GI FOCUSED ASSESSMENT:  Neuro: Awake, alert, oriented x4  Respiratory: even and unlabored   GI: soft and non-distended  EKG Rhythm: normal sinus rhythm    Education:Reviewed general discharge instructions and  information.      The risks and benefits of the bite block have been explained to patient.  Patient verbalizes understanding.

## 2025-07-30 NOTE — ANESTHESIA PRE PROCEDURE
Department of Anesthesiology  Preprocedure Note       Name:  Trish Forbes   Age:  80 y.o.  :  1945                                          MRN:  [id number]         Date:  2025      Surgeon: Surgeon(s):  Connor Schumacher MD    Procedure: Procedure(s):  ESOPHAGOGASTRODUODENOSCOPY ULTRASOUND    Medications prior to admission:   Prior to Admission medications    Medication Sig Start Date End Date Taking? Authorizing Provider   Iron, Ferrous Sulfate, 325 (65 Fe) MG TABS Take 1 tablet by mouth in the morning and at bedtime   Yes Kt Juares MD   Ascorbic Acid (VITAMIN C) 250 MG tablet Take 1 tablet by mouth in the morning and at bedtime   Yes Kt Juares MD   VITAMIN D PO Take by mouth   Yes Kt Juares MD   hydroCHLOROthiazide (HYDRODIURIL) 25 MG tablet Take 1 tablet by mouth daily 25  Yes Umang Arteaga MD   nateglinide (STARLIX) 120 MG tablet Take 1 tablet by mouth three times a day before meals. 25  Yes Umang Arteaga MD   levothyroxine (SYNTHROID) 50 MCG tablet TAKE 1 TABLET BY MOUTH EVERY DAY 5/15/25  Yes Umang Arteaga MD   valsartan (DIOVAN) 160 MG tablet Take 1 tablet by mouth daily 25  Yes Umang Arteaga MD   metFORMIN (GLUCOPHAGE-XR) 500 MG extended release tablet TAKE 1 TABLET BY MOUTH 2 TIMES DAILY (WITH MEALS) 25  Yes Umang Arteaga MD   amLODIPine (NORVASC) 5 MG tablet TAKE 1 TABLET EVERY DAY 25  Yes Umang Arteaga MD   Glucosamine-Fish Oil-EPA--280-32-40 MG CAPS Take 1 tablet by mouth daily   Yes ProviderKt MD   Multiple Vitamins-Minerals (THERAPEUTIC MULTIVITAMIN-MINERALS W/ IRON) TABS tablet Take 1 tablet by mouth daily   Yes Kt Juares MD   calcium carb-cholecalciferol 600-10 MG-MCG TABS per tab Take 1 tablet by mouth daily   Yes Automatic Reconciliation, Ar       Current medications:    Current Facility-Administered Medications   Medication Dose Route Frequency Provider Last

## 2025-07-30 NOTE — DISCHARGE INSTRUCTIONS
Trish Forbes  [id number]  1945    EGD/EUS-FNA DISCHARGE INSTRUCTIONS  Discomfort:  Sore throat- throat lozenges or warm salt water gargle  redness at IV site- apply warm compress to area; if redness or soreness persist- contact your physician  Gaseous discomfort- walking, belching will help relieve any discomfort  You may not operate a vehicle for 12 hours  You may not engage in an occupation involving machinery or appliances for rest of today  You may not drink alcoholic beverages for at least 12 hours  Avoid making any critical decisions for at least 24 hour  DIET  You may have minimal sips at this time-- do not eat or drink for two hours.  You may eat and drink after 145pm today  You may resume your regular diet - however -  remember your colon is empty and a heavy meal will produce gas.   Avoid these foods:  vegetables, fried / greasy foods, carbonated drinks    MEDICATIONS:  [unfilled]    ACTIVITY  You may resume your normal daily activities until tomorrow AM;  Spend the remainder of the day resting -  avoid any strenuous activity.  CALL M.D.  ANY SIGN OF   Increasing pain, nausea, vomiting  Abdominal distension (swelling)  New increased bleeding (oral or rectal)  Fever (chills)  Pain in chest area  Bloody discharge from nose or mouth  Shortness of breath    IMPRESSION:  -Small 2cm hiatal hernia from 38-40cm  -Normal appearance of papilla as seen with the duodenoscope  -Normal esophageal, gastric, and duodenal mucosa; biopsies of stomach obtained to exclude inflammation  -Macrocystic cyst formation within pancreatic tail measuring maximally 1cm x 3.1 cm for largest cyst.with thin septations and minimal residual debris in cyst, but no increased vascularity, mass, or mural nodule noted..Needle aspiration obtained as two separate passes obtain adequate thin slightly cloudy fluid, sent for CEA, cytology with mucin-staining, and amylase  -No lymphadenopathy    Follow-up Instructions:   Call Dr. Schumacher

## 2025-07-30 NOTE — ANESTHESIA POSTPROCEDURE EVALUATION
Department of Anesthesiology  Postprocedure Note    Patient: Trish Forbes  MRN: [id number]  YOB: 1945  Date of evaluation: 7/30/2025    Procedure Summary       Date: 07/30/25 Room / Location: [facility] ENDO 04 / [facility] ENDOSCOPY    Anesthesia Start: 1205 Anesthesia Stop: 1323    Procedures:       ESOPHAGOGASTRODUODENOSCOPY ENDOSCOPIC ULTRASOUND FINE NEEDLE ASPIRATION (Upper GI Region)      ESOPHAGOGASTRODUODENOSCOPY BIOPSY Diagnosis:       Abnormal findings on radiological examination of gastrointestinal tract      Iron deficiency anemia secondary to blood loss (chronic)      (Abnormal findings on radiological examination of gastrointestinal tract [R93.3])    Surgeons: Connor Schumacher MD Responsible Provider: Agustín Mott MD    Anesthesia Type: MAC ASA Status: 3            Anesthesia Type: MAC    Corey Phase I: Corey Score: 10    Corey Phase II: Corey Score: 10    Anesthesia Post Evaluation    Patient location during evaluation: bedside  Patient participation: complete - patient participated  Level of consciousness: awake  Pain score: 0  Airway patency: patent  Nausea & Vomiting: no nausea and no vomiting  Cardiovascular status: hemodynamically stable  Respiratory status: acceptable  Hydration status: euvolemic  Pain management: adequate    No notable events documented.

## 2025-07-30 NOTE — H&P
Sexual Activity    Alcohol use: Yes     Comment: occas    Drug use: No    Sexual activity: Not Currently     Social Drivers of Health     Financial Resource Strain: Low Risk  (9/26/2023)    Overall Financial Resource Strain (CARDIA)     Difficulty of Paying Living Expenses: Not hard at all   Food Insecurity: No Food Insecurity (4/22/2025)    Hunger Vital Sign     Worried About Running Out of Food in the Last Year: Never true     Ran Out of Food in the Last Year: Never true   Transportation Needs: No Transportation Needs (4/22/2025)    PRAPARE - Transportation     Lack of Transportation (Medical): No     Lack of Transportation (Non-Medical): No   Physical Activity: Insufficiently Active (7/9/2025)    Exercise Vital Sign     Days of Exercise per Week: 2 days     Minutes of Exercise per Session: 60 min   Housing Stability: Low Risk  (4/22/2025)    Housing Stability Vital Sign     Unable to Pay for Housing in the Last Year: No     Number of Times Moved in the Last Year: 0     Homeless in the Last Year: No      Family History   Problem Relation Age of Onset    Diabetes Mother     Heart Failure Mother     Cancer Father         stomach      Patient Active Problem List   Diagnosis    Alcohol screening    Post-menopausal    Neck pain    PAF (paroxysmal atrial fibrillation) (HCC)    Hammer toe of second toe of left foot    Primary osteoarthritis involving multiple joints    SBO (small bowel obstruction) (HCC)    Essential hypertension    Controlled type 2 diabetes mellitus with stage 2 chronic kidney disease, without long-term current use of insulin (HCC)    Stage 2 chronic kidney disease    Medicare annual wellness visit, subsequent    Acquired hypothyroidism    Crohn disease (HCC)    Acute bronchitis    Acute non-recurrent maxillary sinusitis    Mixed hyperlipidemia    Edema    Colon cancer screening    Vitamin D deficiency    Presbyopia    Pleuritic chest pain    Pancreatic abnormality       Allergies:   Allergies

## 2025-07-30 NOTE — OP NOTE
NAME:  Trish Forbes   :   1945   MRN:   [id number]     ABIDA SHEPARD [facility]    Date/Time:  2025   Procedure Type: EUS-FNA, EGD with Biopsy    Indications: Abnormal CT scan showing pancreatic cyst, Fe def anemia    Pre-operative Diagnosis: see indication above    Post-operative Diagnosis:  See findings below    : Connor Schumacher MD  Referring Provider: -Leif Erickson MD-Umang Arteaga MD    Procedure Details:    Exam:  Airway: clear, no airway problems anticipated  Heart: RRR, without gallops or rubs  Lungs: clear bilaterally without wheezes, crackles, or rhonchi  Abdomen: soft, nontender, nondistended, bowel sounds present  Mental Status: awake, alert and oriented to person, place and time     Anethesia/Sedation:  MAC anesthesia Propofol 665mg IV      Procedure Details   After infom consent was obtained for the procedure, with all risks and benefits of procedure explained the patient was taken to the endoscopy suite and placed in the left lateral decubitus position.  Following sequential administration of sedation as per above, the upper endoscope, then the duodenoscope, and finally the linear echoendoscope was inserted into the mouth and advanced under direct vision to second portion of the duodenum.  A careful inspection was made as the gastroscope was withdrawn, including a retroflexed view of the proximal stomach; findings and interventions are described below.   Findings:     Endoscopic:  -Small 2cm hiatal hernia from 38-40cm  -Normal appearance of papilla as seen with the duodenoscope  -Normal esophageal, gastric, and duodenal mucosa; biopsies of stomach obtained to exclude inflammation    Ultrasound:   Esophagus: normal findings   Stomach: normal findings   Pancreas:     Areas examined: the entire gland    Parenchyma: -Macrocystic cyst formation within pancreatic tail measuring maximally 1cm x 3.1 cm for largest cyst.with thin septations and minimal residual debris in cyst, but no

## 2025-07-30 NOTE — PROGRESS NOTES
Endoscopy Case End Note:     Each Procedure scope was pre-cleaned, per protocol, at bedside by VANESSA Boyce.       Report received from anesthesia.  See anesthesia flowsheet for intra-procedure vital signs and events.    Glasses returned to patient. Belongings remain under stretcher with patient.

## 2025-07-31 LAB — CYTOLOGY-NON GYN: NORMAL

## 2025-08-01 ENCOUNTER — TRANSCRIBE ORDERS (OUTPATIENT)
Facility: HOSPITAL | Age: 80
End: 2025-08-01

## 2025-08-01 DIAGNOSIS — S86.012A RUPTURE OF LEFT ACHILLES TENDON, INITIAL ENCOUNTER: Primary | ICD-10-CM

## 2025-08-07 ENCOUNTER — CLINICAL DOCUMENTATION (OUTPATIENT)
Age: 80
End: 2025-08-07

## 2025-08-10 ENCOUNTER — HOSPITAL ENCOUNTER (OUTPATIENT)
Facility: HOSPITAL | Age: 80
Discharge: HOME | End: 2025-08-13
Payer: MEDICARE

## 2025-08-10 DIAGNOSIS — S86.012A RUPTURE OF LEFT ACHILLES TENDON, INITIAL ENCOUNTER: ICD-10-CM

## 2025-08-10 PROCEDURE — 73721 MRI JNT OF LWR EXTRE W/O DYE: CPT

## 2025-08-18 RX ORDER — VALSARTAN 160 MG/1
160 TABLET ORAL DAILY
Qty: 90 TABLET | Refills: 1 | Status: SHIPPED | OUTPATIENT
Start: 2025-08-18

## 2025-08-28 ENCOUNTER — CLINICAL DOCUMENTATION (OUTPATIENT)
Age: 80
End: 2025-08-28

## 2025-08-28 ENCOUNTER — OFFICE VISIT (OUTPATIENT)
Age: 80
End: 2025-08-28
Payer: MEDICARE

## 2025-08-28 VITALS
OXYGEN SATURATION: 98 % | BODY MASS INDEX: 28.93 KG/M2 | RESPIRATION RATE: 17 BRPM | HEIGHT: 66 IN | HEART RATE: 97 BPM | WEIGHT: 180 LBS | DIASTOLIC BLOOD PRESSURE: 74 MMHG | TEMPERATURE: 98 F | SYSTOLIC BLOOD PRESSURE: 137 MMHG

## 2025-08-28 DIAGNOSIS — D49.0 IPMN (INTRADUCTAL PAPILLARY MUCINOUS NEOPLASM): Primary | ICD-10-CM

## 2025-08-28 DIAGNOSIS — Z90.81 HISTORY OF SPLENECTOMY: ICD-10-CM

## 2025-08-28 PROCEDURE — 1036F TOBACCO NON-USER: CPT | Performed by: SURGERY

## 2025-08-28 PROCEDURE — 1123F ACP DISCUSS/DSCN MKR DOCD: CPT | Performed by: SURGERY

## 2025-08-28 PROCEDURE — 3078F DIAST BP <80 MM HG: CPT | Performed by: SURGERY

## 2025-08-28 PROCEDURE — 1159F MED LIST DOCD IN RCRD: CPT | Performed by: SURGERY

## 2025-08-28 PROCEDURE — 99205 OFFICE O/P NEW HI 60 MIN: CPT | Performed by: SURGERY

## 2025-08-28 PROCEDURE — 1090F PRES/ABSN URINE INCON ASSESS: CPT | Performed by: SURGERY

## 2025-08-28 PROCEDURE — G8399 PT W/DXA RESULTS DOCUMENT: HCPCS | Performed by: SURGERY

## 2025-08-28 PROCEDURE — 1160F RVW MEDS BY RX/DR IN RCRD: CPT | Performed by: SURGERY

## 2025-08-28 PROCEDURE — G8419 CALC BMI OUT NRM PARAM NOF/U: HCPCS | Performed by: SURGERY

## 2025-08-28 PROCEDURE — 3075F SYST BP GE 130 - 139MM HG: CPT | Performed by: SURGERY

## 2025-08-28 PROCEDURE — G8427 DOCREV CUR MEDS BY ELIG CLIN: HCPCS | Performed by: SURGERY

## 2025-08-28 PROCEDURE — 1126F AMNT PAIN NOTED NONE PRSNT: CPT | Performed by: SURGERY

## 2025-09-02 ENCOUNTER — TELEPHONE (OUTPATIENT)
Age: 80
End: 2025-09-02

## 2025-09-03 ENCOUNTER — OFFICE VISIT (OUTPATIENT)
Facility: CLINIC | Age: 80
End: 2025-09-03

## 2025-09-03 VITALS
HEIGHT: 66 IN | HEART RATE: 92 BPM | OXYGEN SATURATION: 95 % | BODY MASS INDEX: 28.61 KG/M2 | DIASTOLIC BLOOD PRESSURE: 82 MMHG | WEIGHT: 178 LBS | TEMPERATURE: 99.5 F | SYSTOLIC BLOOD PRESSURE: 138 MMHG

## 2025-09-03 DIAGNOSIS — M54.31 SCIATICA OF RIGHT SIDE: Primary | ICD-10-CM

## 2025-09-03 RX ORDER — PREDNISONE 5 MG/1
TABLET ORAL
Qty: 1 EACH | Refills: 0 | Status: SHIPPED | OUTPATIENT
Start: 2025-09-03

## 2025-09-03 ASSESSMENT — PATIENT HEALTH QUESTIONNAIRE - PHQ9
1. LITTLE INTEREST OR PLEASURE IN DOING THINGS: NOT AT ALL
SUM OF ALL RESPONSES TO PHQ QUESTIONS 1-9: 0
2. FEELING DOWN, DEPRESSED OR HOPELESS: NOT AT ALL

## 2025-09-04 ENCOUNTER — TELEPHONE (OUTPATIENT)
Age: 80
End: 2025-09-04

## 2025-09-04 DIAGNOSIS — Z90.81 POST-SPLENECTOMY: ICD-10-CM

## 2025-09-04 DIAGNOSIS — D49.0 INTRADUCTAL PAPILLARY MUCINOUS NEOPLASM OF PANCREAS: Primary | ICD-10-CM

## 2025-09-04 DIAGNOSIS — Z23 ENCOUNTER FOR IMMUNIZATION: ICD-10-CM

## 2025-09-04 PROBLEM — K43.2 INCISIONAL HERNIA: Status: ACTIVE | Noted: 2025-09-04

## (undated) DEVICE — CATH IV AUTOGRD BC PNK 20GA 25 -- INSYTE

## (undated) DEVICE — Device

## (undated) DEVICE — CUFF BLD PRSS AD CLTH SGL TB W/ BAYNT CONN ROUNDED CORNER

## (undated) DEVICE — SYR 3ML LL TIP 1/10ML GRAD --

## (undated) DEVICE — SET GRAV CK VLV NEEDLESS ST 3 GANGED 4WAY STPCOCK HI FLO 10

## (undated) DEVICE — FORCEPS BX L240CM JAW DIA2.8MM L CAP W/ NDL MIC MESH TOOTH

## (undated) DEVICE — CONTAINER SPEC 20 ML LID NEUT BUFF FORMALIN 10 % POLYPR STS

## (undated) DEVICE — ELECTRODE PT RET AD L9FT HI MOIST COND ADH HYDRGEL CORDED

## (undated) DEVICE — SENSOR PLSE OXMTR AD CBL L3FT ADH TRANSMISSIVE

## (undated) DEVICE — 1200 GUARD II KIT W/5MM TUBE W/O VAC TUBE: Brand: GUARDIAN

## (undated) DEVICE — TOWEL 4 PLY TISS 19X30 SUE WHT

## (undated) DEVICE — MEDI-VAC YANK SUCT HNDL W/TPRD BULBOUS TIP & NON-CONDUCTIVE: Brand: CARDINAL HEALTH

## (undated) DEVICE — BASIN EMSIS 16OZ GRAPHITE PLAS KID SHP MOLD GRAD FOR ORAL

## (undated) DEVICE — Z DISCONTINUED PER MEDLINE LINE GAS SAMPLING O2/CO2 LNG AD 13 FT NSL W/ TBNG FILTERLINE

## (undated) DEVICE — BAG SPEC BIOHZRD 10 X 10 IN --

## (undated) DEVICE — IV START KIT: Brand: MEDLINE

## (undated) DEVICE — NEEDLE ASPIR 22 GAX0-8 CM 1.65X2.4 MM SHTH EXPECT SLM LN

## (undated) DEVICE — COVIDIEN KENDALL DL DISPOSABLE 3 LEAD SY: Brand: MEDLINE RENEWAL

## (undated) DEVICE — CATHETER IV 20GA L1.16IN OD1.0414-1.1176MM ID0.762-0.8382MM

## (undated) DEVICE — KENDALL RADIOLUCENT FOAM MONITORING ELECTRODE RECTANGULAR SHAPE: Brand: KENDALL

## (undated) DEVICE — SYR 10ML LUER LOK 1/5ML GRAD --

## (undated) DEVICE — NEEDLE HYPO 18GA L1.5IN PNK S STL HUB POLYPR SHLD REG BVL

## (undated) DEVICE — SOLIDIFIER MEDC 1200ML -- CONVERT TO 356117

## (undated) DEVICE — TIP SUCT TRNSPAR RIB SURF STD BLB RIG NVENT W/ 5IN1 CONN DYND50138] MEDLINE INDUSTRIES INC]

## (undated) DEVICE — SET ADMIN 16ML TBNG L100IN 2 Y INJ SITE IV PIGGY BK DISP

## (undated) DEVICE — SNARE ENDOSCP POLYP MED STD AD 2.4X27X240 CM 2.8 MM OVL SENS

## (undated) DEVICE — FORCEP BX LG CAP 2.4 MMX120 CM W/ NDL YEL RADIAL JAW 4 DISP

## (undated) DEVICE — SOLIDIFIER FLD 2OZ 1500CC N DISINF IN BTL DISP SAFESORB

## (undated) DEVICE — BITEBLOCK 54FR W/ DENT RIM BLOX

## (undated) DEVICE — NEONATAL-ADULT SPO2 SENSOR: Brand: NELLCOR

## (undated) DEVICE — TRAP ENDOSCP POLYP 2 CHMBR DRAWER TYP

## (undated) DEVICE — ELECTRODE,RADIOTRANSLUCENT,FOAM,5PK: Brand: MEDLINE

## (undated) DEVICE — FORCEPS BX 240CM 2.4MM L NDL RAD JAW 4 M00513334

## (undated) DEVICE — ENDOSCOPIC KIT COMPLIANCE ENDOKIT